# Patient Record
Sex: FEMALE | Race: WHITE | NOT HISPANIC OR LATINO | Employment: OTHER | URBAN - METROPOLITAN AREA
[De-identification: names, ages, dates, MRNs, and addresses within clinical notes are randomized per-mention and may not be internally consistent; named-entity substitution may affect disease eponyms.]

---

## 2017-09-26 RX ORDER — DOXYCYCLINE HYCLATE 20 MG
20 TABLET ORAL DAILY
COMMUNITY

## 2017-09-26 RX ORDER — ASPIRIN 81 MG/1
81 TABLET ORAL EVERY EVENING
COMMUNITY

## 2017-09-26 RX ORDER — PHENOL 1.4 %
600 AEROSOL, SPRAY (ML) MUCOUS MEMBRANE DAILY
COMMUNITY
End: 2022-07-29

## 2017-09-26 RX ORDER — FAMOTIDINE 20 MG/1
20 TABLET, FILM COATED ORAL DAILY
COMMUNITY

## 2017-09-26 RX ORDER — NAPROXEN SODIUM 220 MG
220 TABLET ORAL
COMMUNITY
End: 2021-08-05

## 2017-09-26 RX ORDER — HYDROCHLOROTHIAZIDE 12.5 MG/1
12.5 TABLET ORAL EVERY MORNING
COMMUNITY
End: 2021-10-06

## 2017-09-26 RX ORDER — VITAMIN E 268 MG
400 CAPSULE ORAL DAILY
COMMUNITY
End: 2022-05-20

## 2017-09-26 RX ORDER — EZETIMIBE AND SIMVASTATIN 10; 20 MG/1; MG/1
1 TABLET ORAL
COMMUNITY

## 2017-09-26 RX ORDER — MELATONIN
1000 DAILY
COMMUNITY
End: 2022-05-20

## 2017-09-26 NOTE — PRE-PROCEDURE INSTRUCTIONS
Pre-Surgery Instructions:   Medication Instructions    aspirin (ECOTRIN LOW STRENGTH) 81 mg EC tablet Instructed patient per Anesthesia Guidelines   calcium carbonate (OS-DENIS) 600 MG tablet Instructed patient per Anesthesia Guidelines   cholecalciferol (VITAMIN D3) 1,000 units tablet Instructed patient per Anesthesia Guidelines   doxycycline (PERIOSTAT) 20 MG tablet Instructed patient per Anesthesia Guidelines   ezetimibe-simvastatin (VYTORIN) 10-20 mg per tablet Instructed patient per Anesthesia Guidelines   famotidine (PEPCID) 20 mg tablet Instructed patient per Anesthesia Guidelines   hydrochlorothiazide (HYDRODIURIL) 12 5 mg tablet Instructed patient per Anesthesia Guidelines   Lysine 500 MG CAPS Instructed patient per Anesthesia Guidelines   Multiple Vitamins-Minerals (CENTRUM SILVER 50+WOMEN PO) Instructed patient per Anesthesia Guidelines   naproxen sodium (ALEVE) 220 MG tablet Instructed patient per Anesthesia Guidelines   vitamin E, tocopherol, 400 units capsule Instructed patient per Anesthesia Guidelines  Pre op instructions reviewed with pt via phone  Instructed to take famotidine a m of surgery    Diane Cross [phone]  My Surgical Experience    The following information was developed to assist you to prepare for your operation  What do I need to do before coming to the hospital?   Arrange for a responsible person to drive you to and from the hospital    Arrange care for your children at home  Children are not allowed in the recovery areas of the hospital   Plan to wear clothing that is easy to put on and take off  If you are having shoulder surgery, wear a shirt that buttons or zippers in the front  Bathing  o Shower the evening before and the morning of your surgery with an antibacterial soap   Please refer to the Pre Op Showering Instructions for Surgery Patients Sheet   o Remove nail polish and all body piercing jewelry  o Do not shave any body part for at least 24 hours before surgery-this includes face, arms, legs and upper body  Food  o Nothing to eat or drink after midnight the night before your surgery  This includes candy and chewing gum  o Exception: If your surgery is after 12:00pm (noon), you may have clear liquids such as 7-Up®, ginger ale, apple or cranberry juice, Jell-O®, water, or clear broth until 8:00 am  o Do not drink milk or juice with pulp on the morning before surgery  o Do not drink alcohol 24 hours before surgery  Medicine  o Follow instructions you received from your surgeon about which medicines you may take on the day of surgery  o If instructed to take medicine on the morning of surgery, take pills with just a small sip of water  Call your prescribing doctor for specific information on what to do if you take insulin    What should I bring to the hospital?    Bring:  Bibiana Plate or a walker, if you have them, for foot or knee surgery   A list of the daily medicines, vitamins, minerals, herbals and nutritional supplements you take  Include the dosages of medicines and the time you take them each day   Glasses, dentures or hearing aids   Minimal clothing; you will be wearing hospital sleepwear   Photo ID; required to verify your identity   If you have a Living Will or Power of , bring a copy of the documents   If you have an ostomy, bring an extra pouch and any supplies you use    Do not bring   Medicines or inhalers   Money, valuables or jewelry    What other information should I know about the day of surgery?  Notify your surgeons if you develop a cold, sore throat, cough, fever, rash or any other illness     Report to the Ambulatory Surgical/Same Day Surgery Unit   You will be instructed to stop at Registration only if you have not been pre-registered   Inform your  fi they do not stay that they will be asked by the staff to leave a phone number where they can be reached   Be available to be reached before surgery  In the event the operating room schedule changes, you may be asked to come in earlier or later than expected    *It is important to tell your doctor and others involved in your health care if you are taking or have been taking any non-prescription drugs, vitamins, minerals, herbals or other nutritional supplements   Any of these may interact with some food or medicines and cause a reaction

## 2017-10-01 ENCOUNTER — ANESTHESIA EVENT (OUTPATIENT)
Dept: PERIOP | Facility: AMBULARY SURGERY CENTER | Age: 72
End: 2017-10-01
Payer: MEDICARE

## 2017-10-02 ENCOUNTER — HOSPITAL ENCOUNTER (OUTPATIENT)
Facility: AMBULARY SURGERY CENTER | Age: 72
Setting detail: OUTPATIENT SURGERY
Discharge: HOME | End: 2017-10-02
Attending: OPHTHALMOLOGY | Admitting: OPHTHALMOLOGY
Payer: MEDICARE

## 2017-10-02 ENCOUNTER — ANESTHESIA (OUTPATIENT)
Dept: PERIOP | Facility: AMBULARY SURGERY CENTER | Age: 72
End: 2017-10-02
Payer: MEDICARE

## 2017-10-02 VITALS
OXYGEN SATURATION: 100 % | BODY MASS INDEX: 26.4 KG/M2 | HEIGHT: 63 IN | HEART RATE: 60 BPM | RESPIRATION RATE: 16 BRPM | TEMPERATURE: 97.8 F | WEIGHT: 149 LBS | DIASTOLIC BLOOD PRESSURE: 76 MMHG | SYSTOLIC BLOOD PRESSURE: 172 MMHG

## 2017-10-02 PROCEDURE — V2632 POST CHMBR INTRAOCULAR LENS: HCPCS | Performed by: OPHTHALMOLOGY

## 2017-10-02 DEVICE — IOL SN60WF 17.5: Type: IMPLANTABLE DEVICE | Site: EYE | Status: FUNCTIONAL

## 2017-10-02 RX ORDER — TETRACAINE HYDROCHLORIDE 5 MG/ML
SOLUTION OPHTHALMIC AS NEEDED
Status: DISCONTINUED | OUTPATIENT
Start: 2017-10-02 | End: 2017-10-02 | Stop reason: HOSPADM

## 2017-10-02 RX ORDER — LIDOCAINE HYDROCHLORIDE 20 MG/ML
1 JELLY TOPICAL
Status: COMPLETED | OUTPATIENT
Start: 2017-10-02 | End: 2017-10-02

## 2017-10-02 RX ORDER — LIDOCAINE HYDROCHLORIDE 10 MG/ML
INJECTION, SOLUTION EPIDURAL; INFILTRATION; INTRACAUDAL; PERINEURAL AS NEEDED
Status: DISCONTINUED | OUTPATIENT
Start: 2017-10-02 | End: 2017-10-02 | Stop reason: HOSPADM

## 2017-10-02 RX ORDER — KETOROLAC TROMETHAMINE 5 MG/ML
1 SOLUTION OPHTHALMIC
Status: COMPLETED | OUTPATIENT
Start: 2017-10-02 | End: 2017-10-02

## 2017-10-02 RX ORDER — MIDAZOLAM HYDROCHLORIDE 1 MG/ML
INJECTION INTRAMUSCULAR; INTRAVENOUS AS NEEDED
Status: DISCONTINUED | OUTPATIENT
Start: 2017-10-02 | End: 2017-10-02 | Stop reason: SURG

## 2017-10-02 RX ORDER — CYCLOPENTOLATE HYDROCHLORIDE 10 MG/ML
1 SOLUTION/ DROPS OPHTHALMIC
Status: COMPLETED | OUTPATIENT
Start: 2017-10-02 | End: 2017-10-02

## 2017-10-02 RX ORDER — GATIFLOXACIN 5 MG/ML
1 SOLUTION/ DROPS OPHTHALMIC 2 TIMES DAILY
Qty: 3 ML | Refills: 0 | Status: ON HOLD
Start: 2017-10-02 | End: 2017-10-23

## 2017-10-02 RX ORDER — GATIFLOXACIN 5 MG/ML
SOLUTION/ DROPS OPHTHALMIC AS NEEDED
Status: DISCONTINUED | OUTPATIENT
Start: 2017-10-02 | End: 2017-10-02 | Stop reason: HOSPADM

## 2017-10-02 RX ORDER — PHENYLEPHRINE HCL 2.5 %
1 DROPS OPHTHALMIC (EYE)
Status: COMPLETED | OUTPATIENT
Start: 2017-10-02 | End: 2017-10-02

## 2017-10-02 RX ORDER — TETRACAINE HYDROCHLORIDE 5 MG/ML
1 SOLUTION OPHTHALMIC ONCE
Status: COMPLETED | OUTPATIENT
Start: 2017-10-02 | End: 2017-10-02

## 2017-10-02 RX ADMIN — KETOROLAC TROMETHAMINE 1 DROP: 5 SOLUTION OPHTHALMIC at 11:00

## 2017-10-02 RX ADMIN — KETOROLAC TROMETHAMINE 1 DROP: 5 SOLUTION OPHTHALMIC at 11:26

## 2017-10-02 RX ADMIN — LIDOCAINE HYDROCHLORIDE 1 APPLICATION: 20 JELLY TOPICAL at 11:26

## 2017-10-02 RX ADMIN — CYCLOPENTOLATE HYDROCHLORIDE 1 DROP: 10 SOLUTION/ DROPS OPHTHALMIC at 10:45

## 2017-10-02 RX ADMIN — MIDAZOLAM HYDROCHLORIDE 2 MG: 1 INJECTION, SOLUTION INTRAMUSCULAR; INTRAVENOUS at 11:45

## 2017-10-02 RX ADMIN — PHENYLEPHRINE HYDROCHLORIDE 1 DROP: 25 SOLUTION/ DROPS OPHTHALMIC at 10:45

## 2017-10-02 RX ADMIN — CYCLOPENTOLATE HYDROCHLORIDE 1 DROP: 10 SOLUTION/ DROPS OPHTHALMIC at 11:15

## 2017-10-02 RX ADMIN — PHENYLEPHRINE HYDROCHLORIDE 1 DROP: 25 SOLUTION/ DROPS OPHTHALMIC at 11:26

## 2017-10-02 RX ADMIN — PHENYLEPHRINE HYDROCHLORIDE 1 DROP: 25 SOLUTION/ DROPS OPHTHALMIC at 11:15

## 2017-10-02 RX ADMIN — PHENYLEPHRINE HYDROCHLORIDE 1 DROP: 25 SOLUTION/ DROPS OPHTHALMIC at 11:00

## 2017-10-02 RX ADMIN — LIDOCAINE HYDROCHLORIDE 1 APPLICATION: 20 JELLY TOPICAL at 11:00

## 2017-10-02 RX ADMIN — LIDOCAINE HYDROCHLORIDE 1 APPLICATION: 20 JELLY TOPICAL at 10:45

## 2017-10-02 RX ADMIN — KETOROLAC TROMETHAMINE 1 DROP: 5 SOLUTION OPHTHALMIC at 10:45

## 2017-10-02 RX ADMIN — CYCLOPENTOLATE HYDROCHLORIDE 1 DROP: 10 SOLUTION/ DROPS OPHTHALMIC at 11:26

## 2017-10-02 RX ADMIN — LIDOCAINE HYDROCHLORIDE 1 APPLICATION: 20 JELLY TOPICAL at 11:15

## 2017-10-02 RX ADMIN — TETRACAINE HYDROCHLORIDE 1 DROP: 5 SOLUTION OPHTHALMIC at 10:45

## 2017-10-02 RX ADMIN — KETOROLAC TROMETHAMINE 1 DROP: 5 SOLUTION OPHTHALMIC at 11:15

## 2017-10-02 RX ADMIN — CYCLOPENTOLATE HYDROCHLORIDE 1 DROP: 10 SOLUTION/ DROPS OPHTHALMIC at 11:00

## 2017-10-02 NOTE — OP NOTE
OPERATIVE REPORT    PATIENT NAME: Monica Lu    :  1945  MRN: [id number]  Pt Location: [facility] OR ROOM 01    Surgery Date: 10/2/2017    Surgeon(s) and Role:     * Keyonna Aggarwal MD - Primary    Age-related nuclear cataract of left eye [H25 12]    Post-Op Diagnosis Codes:     * Age-related nuclear cataract of left eye [H25 12]    Procedure(s):  EXTRACTION EXTRACAPSULAR CATARACT PHACO INTRAOCULAR LENS (IOL)    Anesthesia Type:   IV Sedation with Anesthesia    Operative Indications:  Age-related nuclear cataract of left eye [H25 12]  Decreased vision to 20/50 with glare  With problems driving  Pt requested cataract sx the left eye    Procedure and Technique:    Procedure Details     The patient was brought in the OR in stable condition and placed on the operative table  The left eye was prepped and draped in the usual sterile manner  Attention was directed to the left eye where a lid speculum was placed  A 2 4 mm clear corneal incision was made temperally  1/2 cc of 1% MPF Lidocaine was irrigated into the anterior chamber followed by viscoat  The side port incision was placed superiorly  The capsularrhexis was made and the nucleus was hydrodissected with BSS  The nucleus was then removed with the phaco handpiece followed by removal of the cortical material with the I/A handpiece  The capsular bag was then filled with Provisc  The IOL was folded and placed in to the capsular bag and centered well  The remaining Provisc was removed from the eye with the I/A  The wounds were hydrated with BSS and found to be water tight  The lid speculum was removed and 2 drops of Gatifloxicin were placed over the cornea  A protective eye shield was taped over the eye and the patient went to PACU in stable condition  I will see the patient in the office tomorrow and the expected post op period is a few weeks         Complications: None        Disposition: PACU   Condition: Stable    SIGNATURE: Keyonna Aggarwal MD  DATE: October 2, 2017  TIME: 12:04 PM

## 2017-10-02 NOTE — ANESTHESIA PREPROCEDURE EVALUATION
Review of Systems/Medical History  Patient summary reviewed  Chart reviewed  History of anesthetic complications PONV    Cardiovascular  Hyperlipidemia, Hypertension ,    Pulmonary       GI/Hepatic    GERD ,             Endo/Other     GYN       Hematology   Musculoskeletal       Neurology   Psychology           Physical Exam    Airway      TM Distance: >3 FB  Neck ROM: full     Dental   No notable dental hx     Cardiovascular  Cardiovascular exam normal    Pulmonary  Pulmonary exam normal     Other Findings        Anesthesia Plan  ASA Score- 3       Anesthesia Type- IV sedation with anesthesia        Induction-       Informed Consent  Anesthetic plan and risks discussed with patient

## 2017-10-02 NOTE — DISCHARGE INSTRUCTIONS
Dr Ivan Liang Cataract Instructions    Activity:     1  No Driving until instructed   2  Keep shield on until seen tomorrow except when administering drops   3  No heavy lifting   4  No water in eye     Diet:     1  Resume normal diet    Normal Symptoms:     1  Mild Headache   2  Scratchy or picky feeling around eye    Call the office if:     1  You have any questions or concerns   2  If eye pain is not relieved by extra strength tylenol    Office phone number:  [phone]      Next appointment:     1  See Dr Ivan Liang at his office tomorrow as scheduled   __________________________________________________________   2  Bring blue eye kit with you and eyedrops to the office    A new set of comprehensive instructions will be given and reviewed with you during your office visit tomorrow

## 2017-10-20 NOTE — PRE-PROCEDURE INSTRUCTIONS
My Surgical Experience    The following information was developed to assist you to prepare for your operation  What do I need to do before coming to the hospital?   Arrange for a responsible person to drive you to and from the hospital    Arrange care for your children at home  Children are not allowed in the recovery areas of the hospital   Plan to wear clothing that is easy to put on and take off  If you are having shoulder surgery, wear a shirt that buttons or zippers in the front  Bathing  o Shower the evening before and the morning of your surgery with an antibacterial soap  Please refer to the Pre Op Showering Instructions for Surgery Patients Sheet   o Remove nail polish and all body piercing jewelry  o Do not shave any body part for at least 24 hours before surgery-this includes face, arms, legs and upper body  Food  o Nothing to eat or drink after midnight the night before your surgery  This includes candy and chewing gum  o Exception: If your surgery is after 12:00pm (noon), you may have clear liquids such as 7-Up®, ginger ale, apple or cranberry juice, Jell-O®, water, or clear broth until 8:00 am  o Do not drink milk or juice with pulp on the morning before surgery  o Do not drink alcohol 24 hours before surgery  Medicine  o Follow instructions you received from your surgeon about which medicines you may take on the day of surgery  o If instructed to take medicine on the morning of surgery, take pills with just a small sip of water  Call your prescribing doctor for specific infroamtion on what to do if you take insulin    What should I bring to the hospital?    Bring:  Sandor Hunt or a walker, if you have them, for foot or knee surgery   A list of the daily medicines, vitamins, minerals, herbals and nutritional supplements you take   Include the dosages of medicines and the time you take them each day   Glasses, dentures or hearing aids   Minimal clothing; you will be wearing hospital sleepwear   Photo ID; required to verify your identity   If you have a Living Will or Power of , bring a copy of the documents   If you have an ostomy, bring an extra pouch and any supplies you use    Do not bring   Medicines or inhalers   Money, valuables or jewelry    What other information should I know about the day of surgery?  Notify your surgeons if you develop a cold, sore throat, cough, fever, rash or any other illness   Report to the Ambulatory Surgical/Same Day Surgery Unit   You will be instructed to stop at Registration only if you have not been pre-registered   Inform your  fi they do not stay that they will be asked by the staff to leave a phone number where they can be reached   Be available to be reached before surgery  In the event the operating room schedule changes, you may be asked to come in earlier or later than expected    *It is important to tell your doctor and others involved in your health care if you are taking or have been taking any non-prescription drugs, vitamins, minerals, herbals or other nutritional supplements  Any of these may interact with some food or medicines and cause a reaction      Pre-Surgery Instructions:   Medication Instructions    aspirin (ECOTRIN LOW STRENGTH) 81 mg EC tablet Instructed patient per Anesthesia Guidelines   Bromfenac Sodium (BROMSITE) 0 075 % SOLN Instructed patient per Anesthesia Guidelines   calcium carbonate (OS-DENIS) 600 MG tablet Instructed patient per Anesthesia Guidelines   cholecalciferol (VITAMIN D3) 1,000 units tablet Instructed patient per Anesthesia Guidelines   doxycycline (PERIOSTAT) 20 MG tablet Instructed patient per Anesthesia Guidelines   ezetimibe-simvastatin (VYTORIN) 10-20 mg per tablet Instructed patient per Anesthesia Guidelines   famotidine (PEPCID) 20 mg tablet Instructed patient per Anesthesia Guidelines   gatifloxacin (ZYMAXID) 0 5 % Instructed patient per Anesthesia Guidelines   hydrochlorothiazide (HYDRODIURIL) 12 5 mg tablet Instructed patient per Anesthesia Guidelines   Lysine 500 MG CAPS Instructed patient per Anesthesia Guidelines   Multiple Vitamins-Minerals (CENTRUM SILVER 50+WOMEN PO) Instructed patient per Anesthesia Guidelines   naproxen sodium (ALEVE) 220 MG tablet Instructed patient per Anesthesia Guidelines   vitamin E, tocopherol, 400 units capsule Instructed patient per Anesthesia Guidelines  To take pepcid a m   Of surgery

## 2017-10-23 ENCOUNTER — HOSPITAL ENCOUNTER (OUTPATIENT)
Facility: AMBULARY SURGERY CENTER | Age: 72
Setting detail: OUTPATIENT SURGERY
Discharge: HOME | End: 2017-10-23
Attending: OPHTHALMOLOGY | Admitting: OPHTHALMOLOGY
Payer: MEDICARE

## 2017-10-23 ENCOUNTER — ANESTHESIA (OUTPATIENT)
Dept: PERIOP | Facility: AMBULARY SURGERY CENTER | Age: 72
End: 2017-10-23
Payer: MEDICARE

## 2017-10-23 ENCOUNTER — ANESTHESIA EVENT (OUTPATIENT)
Dept: PERIOP | Facility: AMBULARY SURGERY CENTER | Age: 72
End: 2017-10-23
Payer: MEDICARE

## 2017-10-23 VITALS
TEMPERATURE: 97.2 F | OXYGEN SATURATION: 97 % | DIASTOLIC BLOOD PRESSURE: 72 MMHG | HEART RATE: 55 BPM | RESPIRATION RATE: 20 BRPM | SYSTOLIC BLOOD PRESSURE: 174 MMHG

## 2017-10-23 PROCEDURE — V2632 POST CHMBR INTRAOCULAR LENS: HCPCS | Performed by: OPHTHALMOLOGY

## 2017-10-23 DEVICE — IOL SN60WF 17.5: Type: IMPLANTABLE DEVICE | Site: EYE | Status: FUNCTIONAL

## 2017-10-23 RX ORDER — CYCLOPENTOLATE HYDROCHLORIDE 10 MG/ML
1 SOLUTION/ DROPS OPHTHALMIC
Status: COMPLETED | OUTPATIENT
Start: 2017-10-23 | End: 2017-10-23

## 2017-10-23 RX ORDER — LIDOCAINE HYDROCHLORIDE 20 MG/ML
1 JELLY TOPICAL
Status: COMPLETED | OUTPATIENT
Start: 2017-10-23 | End: 2017-10-23

## 2017-10-23 RX ORDER — PHENYLEPHRINE HCL 2.5 %
1 DROPS OPHTHALMIC (EYE)
Status: COMPLETED | OUTPATIENT
Start: 2017-10-23 | End: 2017-10-23

## 2017-10-23 RX ORDER — KETOROLAC TROMETHAMINE 5 MG/ML
1 SOLUTION OPHTHALMIC
Status: COMPLETED | OUTPATIENT
Start: 2017-10-23 | End: 2017-10-23

## 2017-10-23 RX ORDER — MIDAZOLAM HYDROCHLORIDE 1 MG/ML
INJECTION INTRAMUSCULAR; INTRAVENOUS AS NEEDED
Status: DISCONTINUED | OUTPATIENT
Start: 2017-10-23 | End: 2017-10-23 | Stop reason: SURG

## 2017-10-23 RX ORDER — GATIFLOXACIN 5 MG/ML
1 SOLUTION/ DROPS OPHTHALMIC 2 TIMES DAILY
Qty: 3 ML | Refills: 0
Start: 2017-10-23 | End: 2020-07-01

## 2017-10-23 RX ORDER — LIDOCAINE HYDROCHLORIDE 10 MG/ML
INJECTION, SOLUTION EPIDURAL; INFILTRATION; INTRACAUDAL; PERINEURAL AS NEEDED
Status: DISCONTINUED | OUTPATIENT
Start: 2017-10-23 | End: 2017-10-23 | Stop reason: HOSPADM

## 2017-10-23 RX ORDER — TETRACAINE HYDROCHLORIDE 5 MG/ML
SOLUTION OPHTHALMIC AS NEEDED
Status: DISCONTINUED | OUTPATIENT
Start: 2017-10-23 | End: 2017-10-23 | Stop reason: HOSPADM

## 2017-10-23 RX ORDER — TETRACAINE HYDROCHLORIDE 5 MG/ML
1 SOLUTION OPHTHALMIC ONCE
Status: COMPLETED | OUTPATIENT
Start: 2017-10-23 | End: 2017-10-23

## 2017-10-23 RX ADMIN — LIDOCAINE HYDROCHLORIDE 1 APPLICATION: 20 JELLY TOPICAL at 11:49

## 2017-10-23 RX ADMIN — KETOROLAC TROMETHAMINE 1 DROP: 5 SOLUTION OPHTHALMIC at 12:08

## 2017-10-23 RX ADMIN — PHENYLEPHRINE HYDROCHLORIDE 1 DROP: 25 SOLUTION/ DROPS OPHTHALMIC at 11:49

## 2017-10-23 RX ADMIN — TETRACAINE HYDROCHLORIDE 1 DROP: 5 SOLUTION OPHTHALMIC at 11:35

## 2017-10-23 RX ADMIN — LIDOCAINE HYDROCHLORIDE 1 APPLICATION: 20 JELLY TOPICAL at 12:21

## 2017-10-23 RX ADMIN — CYCLOPENTOLATE HYDROCHLORIDE 1 DROP: 10 SOLUTION/ DROPS OPHTHALMIC at 12:08

## 2017-10-23 RX ADMIN — PHENYLEPHRINE HYDROCHLORIDE 1 DROP: 25 SOLUTION/ DROPS OPHTHALMIC at 11:35

## 2017-10-23 RX ADMIN — KETOROLAC TROMETHAMINE 1 DROP: 5 SOLUTION OPHTHALMIC at 12:21

## 2017-10-23 RX ADMIN — LIDOCAINE HYDROCHLORIDE 1 APPLICATION: 20 JELLY TOPICAL at 12:08

## 2017-10-23 RX ADMIN — PHENYLEPHRINE HYDROCHLORIDE 1 DROP: 25 SOLUTION/ DROPS OPHTHALMIC at 12:08

## 2017-10-23 RX ADMIN — LIDOCAINE HYDROCHLORIDE 1 APPLICATION: 20 JELLY TOPICAL at 11:35

## 2017-10-23 RX ADMIN — KETOROLAC TROMETHAMINE 1 DROP: 5 SOLUTION OPHTHALMIC at 11:49

## 2017-10-23 RX ADMIN — MIDAZOLAM HYDROCHLORIDE 1 MG: 1 INJECTION, SOLUTION INTRAMUSCULAR; INTRAVENOUS at 12:34

## 2017-10-23 RX ADMIN — CYCLOPENTOLATE HYDROCHLORIDE 1 DROP: 10 SOLUTION/ DROPS OPHTHALMIC at 11:35

## 2017-10-23 RX ADMIN — CYCLOPENTOLATE HYDROCHLORIDE 1 DROP: 10 SOLUTION/ DROPS OPHTHALMIC at 12:21

## 2017-10-23 RX ADMIN — CYCLOPENTOLATE HYDROCHLORIDE 1 DROP: 10 SOLUTION/ DROPS OPHTHALMIC at 11:49

## 2017-10-23 RX ADMIN — PHENYLEPHRINE HYDROCHLORIDE 1 DROP: 25 SOLUTION/ DROPS OPHTHALMIC at 12:21

## 2017-10-23 RX ADMIN — MIDAZOLAM HYDROCHLORIDE 1 MG: 1 INJECTION, SOLUTION INTRAMUSCULAR; INTRAVENOUS at 12:33

## 2017-10-23 RX ADMIN — KETOROLAC TROMETHAMINE 1 DROP: 5 SOLUTION OPHTHALMIC at 11:35

## 2017-10-23 NOTE — OP NOTE
OPERATIVE REPORT    PATIENT NAME: Rosalba Contreras    :  1945  MRN: [id number]  Pt Location: [address] ROOM 01    Surgery Date: 10/23/2017    Surgeon(s) and Role:     * Myra Meek MD - Primary    Age-related nuclear cataract of right eye [H25 11]    Post-Op Diagnosis Codes:     * Age-related nuclear cataract of right eye [H25 11]    Procedure(s):  EXTRACTION EXTRACAPSULAR CATARACT PHACO INTRAOCULAR LENS (IOL)    Anesthesia Type:   IV Sedation with Anesthesia    Operative Indications:  Age-related nuclear cataract of right eye [H25 11]  Decreased vision to 20/40  With problems driving  Pt requested cataract sx the right eye    Procedure and Technique:    Procedure Details     The patient was brought in the OR in stable condition and placed on the operative table  The right eye was prepped and draped in the usual sterile manner  Attention was directed to the right eye where a lid speculum was placed  A 2 4 mm clear corneal incision was made temperally  1/2 cc of 1% MPF Lidocaine was irrigated into the anterior chamber followed by viscoat  The side port incision was placed superiorly  The capsularrhexis was made and the nucleus was hydrodissected with BSS  The nucleus was then removed with the phaco handpiece followed by removal of the cortical material with the I/A handpiece  The capsular bag was then filled with Provisc  The IOL was folded and placed in to the capsular bag and centered well  The remaining Provisc was removed from the eye with the I/A  The wounds were hydrated with BSS and found to be water tight  The lid speculum was removed and 2 drops of Gatifloxicin were placed over the cornea  A protective eye shield was taped over the eye and the patient went to PACU in stable condition  I will see the patient in the office tomorrow and the expected post op period is a few weeks         Complications: None        Disposition: PACU   Condition: Stable    SIGNATURE: Myra Meek MD  DATE:  2017  TIME: 12:51 PM normal (ped)...

## 2017-10-23 NOTE — ANESTHESIA PREPROCEDURE EVALUATION
Review of Systems/Medical History  Patient summary reviewed  Chart reviewed  History of anesthetic complications PONV    Cardiovascular  Hyperlipidemia, Hypertension ,    Pulmonary       GI/Hepatic    GERD ,             Endo/Other  Arthritis     GYN       Hematology   Musculoskeletal       Neurology   Psychology           Physical Exam    Airway    Mallampati score: II  TM Distance: >3 FB  Neck ROM: full     Dental   No notable dental hx     Cardiovascular  Cardiovascular exam normal    Pulmonary  Pulmonary exam normal     Other Findings        Anesthesia Plan  ASA Score- 3       Anesthesia Type- IV sedation with anesthesia with ASA Monitors  Additional Monitors:   Airway Plan:           Induction-       Informed Consent- Anesthetic plan and risks discussed with patient

## 2017-10-23 NOTE — DISCHARGE INSTRUCTIONS
Dr Quinton Solo Cataract Instructions    Activity:     1  No Driving until instructed   2  Keep shield on until seen tomorrow except when administering drops   3  No heavy lifting   4  No water in eye     Diet:     1  Resume normal diet    Normal Symptoms:     1  Mild Headache   2  Scratchy or picky feeling around eye    Call the office if:     1  You have any questions or concerns   2  If eye pain is not relieved by extra strength tylenol    Office phone number:  [phone]      Next appointment:     1  See Dr Quinton Solo at his office tomorrow as scheduled   __1115am________________________________________________________   2  Bring blue eye kit with you and eyedrops to the office    A new set of comprehensive instructions will be given and reviewed with you during your office visit tomorrow

## 2018-10-02 ENCOUNTER — EVALUATION (OUTPATIENT)
Dept: PHYSICAL THERAPY | Facility: CLINIC | Age: 73
End: 2018-10-02
Payer: MEDICARE

## 2018-10-02 ENCOUNTER — TRANSCRIBE ORDERS (OUTPATIENT)
Dept: PHYSICAL THERAPY | Facility: CLINIC | Age: 73
End: 2018-10-02

## 2018-10-02 DIAGNOSIS — G89.29 CHRONIC PAIN OF LEFT KNEE: Primary | ICD-10-CM

## 2018-10-02 DIAGNOSIS — M25.562 ACUTE PAIN OF LEFT KNEE: Primary | ICD-10-CM

## 2018-10-02 DIAGNOSIS — M25.562 CHRONIC PAIN OF LEFT KNEE: Primary | ICD-10-CM

## 2018-10-02 PROCEDURE — G8978 MOBILITY CURRENT STATUS: HCPCS | Performed by: PHYSICAL THERAPIST

## 2018-10-02 PROCEDURE — 97161 PT EVAL LOW COMPLEX 20 MIN: CPT | Performed by: PHYSICAL THERAPIST

## 2018-10-02 PROCEDURE — G8979 MOBILITY GOAL STATUS: HCPCS | Performed by: PHYSICAL THERAPIST

## 2018-10-02 PROCEDURE — 97110 THERAPEUTIC EXERCISES: CPT | Performed by: PHYSICAL THERAPIST

## 2018-10-02 NOTE — PROGRESS NOTES
PT Evaluation     Today's date: 10/2/2018  Patient name: Vincent Ortiz  : 1945  MRN: [id number]  Referring provider: Svitlana Rocha MD  Dx:   Encounter Diagnosis     ICD-10-CM    1  Acute pain of left knee M25 562        Start Time: 1555  Stop Time: 1640  Total time in clinic (min): 45 minutes    Assessment  Impairments: abnormal gait, abnormal muscle firing, abnormal muscle tone, abnormal or restricted ROM, abnormal movement, impaired balance, impaired physical strength, lacks appropriate home exercise program, pain with function and poor posture     Assessment details: Patient is a 74 y o  female who presents with s/s consistent with L medial posterior meniscus tear  Patient presents to evaluation with pain, decreased range of motion, swelling, decreased strength and decreased tolerance to activity  She is currently having pain with stair climbing, kneeling, and prolonged walking that limits quality of life  Patient is a good candidate for skilled PT, and would benefit from skilled PT services to address these impairments, achieve goals, and to maximize function   Thank you for the referral     Understanding of Dx/Px/POC: good   Prognosis: good    Goals  Impairment Goals 4-6 weeks  - Decrease pain to <3/10  - Improve knee AROM to equal to the unaffected lower extremity  - Increase knee strength to 4+/5 throughout  - Increase hip strength to 4+/5 throughout    Functional Goals 6-8 weeks  - Return to Prior Level of Function  - Increase Functional Status Measure (FOTO) to: 58  - Patient will be independent with HEP  - Patient will be able to kneel without increased pain/compensation/difficulty  - Patient will be able to walk for prolonged periods without increased pain/compensation/difficulty   - Patient will be able to ascend and descend stairs without increased pain/compensation/difficulty   - Patient will be able to raise from the floor with proper mechanics and without increased pain/compensation/difficulty     Plan  Patient would benefit from: skilled physical therapy  Planned modality interventions: cryotherapy, electrical stimulation/Russian stimulation, high voltage pulsed current: pain management, high voltage pulsed current: spasm management, H-Wave, TENS, thermotherapy: hydrocollator packs, ultrasound, unattended electrical stimulation and traction  Planned therapy interventions: abdominal trunk stabilization, behavior modification, body mechanics training, breathing training, flexibility, functional ROM exercises, home exercise program, joint mobilization, manual therapy, massage, Olivera taping, muscle pump exercises, neuromuscular re-education, patient education, postural training, strengthening, stretching, therapeutic activities, therapeutic exercise and therapeutic training  Frequency: 2x week  Duration in weeks: 8  Treatment plan discussed with: patient        Subjective Evaluation    History of Present Illness  Mechanism of injury: 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[employer]  HOME LIFE: Lives at home with her   [address] and [place]  Laundry and TV is in the basement 14 steps down  HOBBIES/EXERCISE: Patient has silver sneakers, and was working out there until a few months ago  Patient belongs to choirs and practices in the evening  She enjoys gardening as well  PLOF: No other issues prior to the knee injury  HISTORY OF CURRENT INJURY: Patient reports and increase in L knee pain < 1 month ago in the middle of September  She felt increase of pain especially when going down stairs  PAIN LOCATION/DESCRIPTORS: Pain located in medial posterior knee  Described as aching, and occasionally she feels the pain down into her L calf  AGGRAVATING FACTORS:  Stairs, especially descending step over step   Planting and twisting, kneeling are also painful, and bending the knee and raising from the floor and walking  EASES: Sitting  DAY PATTERN: Pain radually increases throughout the day  IMAGING:  X-rays showed minimal arthritis in knees  MRI showed meniscus tear in L posterior medial meniscus  SPECIAL QUESTIONS:  Hx of basal cell cancer 5 years ago  Some pre-cancer now on the top of her head  PATIENT GOALS: Patient wishes to improve her pain levels so she can kneel in the garden and go down steps again  Pain  Current pain ratin  At best pain ratin  At worst pain ratin  Quality: dull ache, sharp, discomfort and radiating  Progression: worsening    Patient Goals  Patient goals for therapy: decreased pain, increased motion, increased strength, independence with ADLs/IADLs and return to sport/leisure activities          Objective     Observations     Additional Observation Details  Patient has bilateral Trendelenburg when ambulating, but no antalgic gait  Palpation     Additional Palpation Details  TTP of medial posterior knee, lateral joint line, and to proximal calf    Active Range of Motion   Left Knee   Flexion: 122 degrees with pain  Extension: 0 degrees     Right Knee   Flexion: 139 degrees   Extension: 0 degrees     Strength/Myotome Testing     Left Hip   Planes of Motion   Flexion: 3+  Abduction: 3+  Adduction: 4  External rotation: 4  Internal rotation: 4    Right Hip   Planes of Motion   Flexion: 4-  Abduction: 3+  Adduction: 4  External rotation: 4  Internal rotation: 4    Left Knee   Flexion: 3+  Extension: 3+    Right Knee   Flexion: 4-  Extension: 4    Left Ankle/Foot   Dorsiflexion: 4+  Plantar flexion: 4+  Inversion: 4+  Eversion: 4+    Right Ankle/Foot   Dorsiflexion: 4+  Plantar flexion: 4+  Inversion: 4+  Eversion: 4+    Tests     Left Knee   Positive medial Candie and Thessaly's test at 5 degrees  Negative valgus stress test at 30 degrees and varus stress test at 30 degrees  Right Knee   Negative medial Candie, valgus stress test at 30 degrees and varus stress test at 30 degrees       Swelling     Left Knee Girth Measurement (cm)   Joint line: 40 5 cm  10 cm above joint line: 45 cm  10 cm below joint line: 36 cm    Right Knee Girth Measurement (cm)   Joint line: 43 cm  10 cm above joint line: 37 cm  10 cm below joint line: 35 5 cm      Flowsheet Rows      Most Recent Value   PT/OT G-Codes   Current Score  51   Projected Score  62   FOTO information reviewed  Yes   Assessment Type  Evaluation   G code set  Mobility: Walking & Moving Around   Mobility: Walking and Moving Around Current Status ()  CK   Mobility: Walking and Moving Around Goal Status ()  CH         Diagnosis: L medial posterior meniscus tear   Precautions: PMH of skin CA   Manual Therapy 10/2       Tibial distraction        Long axis distraction        Patellar mobs        STM                Exercise Diary         Recumbent bike - if tolerated        Calf stretch against wall 10x10"       Hamstring stretch        Hip adductor stretch        Heel slides X 20 with strap, 2 sec hold       Quad sets        Clamshells        3 way SLR        Glute sets        TA activation with stabilizer        Bridges        Mini squats - hip strategy                                                                        Modalities        Ice

## 2018-10-02 NOTE — LETTER
2018    Sherry Joseph MD  [facility] [address] [place] [address]    Patient: Chacho Hogan   YOB: 1945   Date of Visit: 10/2/2018     Encounter Diagnosis     ICD-10-CM    1  Acute pain of left knee M25 562        Dear Dr Witt March:    Please review the attached Plan of Care from [facility] recent visit  Please verify that you agree therapy should continue by signing the attached document and sending it back to our office  If you have any questions or concerns, please don't hesitate to call  Sincerely,    Mortimer Meeter, PT      Referring Provider:      I certify that I have read the below Plan of Care and certify the need for these services furnished under this plan of treatment while under my care  Sherry Joseph MD  [facility]: [phone]          PT Evaluation     Today's date: 10/2/2018  Patient name: Chacho Hogan  : 1945  MRN: [id number]  Referring provider: Teresa Ghosh MD  Dx:   Encounter Diagnosis     ICD-10-CM    1  Acute pain of left knee M25 562        Start Time: 1555  Stop Time: 1640  Total time in clinic (min): 45 minutes    Assessment  Impairments: abnormal gait, abnormal muscle firing, abnormal muscle tone, abnormal or restricted ROM, abnormal movement, impaired balance, impaired physical strength, lacks appropriate home exercise program, pain with function and poor posture     Assessment details: Patient is a 74 y o  female who presents with s/s consistent with L medial posterior meniscus tear  Patient presents to evaluation with pain, decreased range of motion, swelling, decreased strength and decreased tolerance to activity  She is currently having pain with stair climbing, kneeling, and prolonged walking that limits quality of life   Patient is a good candidate for skilled PT, and would benefit from skilled PT services to address these impairments, achieve goals, and to maximize function  Thank you for the referral     Understanding of Dx/Px/POC: good   Prognosis: good    Goals  Impairment Goals 4-6 weeks  - Decrease pain to <3/10  - Improve knee AROM to equal to the unaffected lower extremity  - Increase knee strength to 4+/5 throughout  - Increase hip strength to 4+/5 throughout    Functional Goals 6-8 weeks  - Return to Prior Level of Function  - Increase Functional Status Measure (FOTO) to: 58  - Patient will be independent with HEP  - Patient will be able to kneel without increased pain/compensation/difficulty  - Patient will be able to walk for prolonged periods without increased pain/compensation/difficulty   - Patient will be able to ascend and descend stairs without increased pain/compensation/difficulty   - Patient will be able to raise from the floor with proper mechanics and without increased pain/compensation/difficulty     Plan  Patient would benefit from: skilled physical therapy  Planned modality interventions: cryotherapy, electrical stimulation/Russian stimulation, high voltage pulsed current: pain management, high voltage pulsed current: spasm management, H-Wave, TENS, thermotherapy: hydrocollator packs, ultrasound, unattended electrical stimulation and traction  Planned therapy interventions: abdominal trunk stabilization, behavior modification, body mechanics training, breathing training, flexibility, functional ROM exercises, home exercise program, joint mobilization, manual therapy, massage, Olivera taping, muscle pump exercises, neuromuscular re-education, patient education, postural training, strengthening, stretching, therapeutic activities, therapeutic exercise and therapeutic training  Frequency: 2x week  Duration in weeks: 8  Treatment plan discussed with: patient        Subjective Evaluation    History of Present Illness  Mechanism of injury: 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[address] [place] [address] LIFE: Lives at home with her   2 [address] and [place]  Laundry and TV is in the basement 14 steps down  HOBBIES/EXERCISE: Patient has silver sneakers, and was working out there until a few months ago  Patient belongs to choirs and practices in the evening  She enjoys gardening as well  PLOF: No other issues prior to the knee injury  HISTORY OF CURRENT INJURY: Patient reports and increase in L knee pain < 1 month ago in the middle of September  She felt increase of pain especially when going down stairs  PAIN LOCATION/DESCRIPTORS: Pain located in medial posterior knee  Described as aching, and occasionally she feels the pain down into her L calf  AGGRAVATING FACTORS:  Stairs, especially descending step over step  Planting and twisting, kneeling are also painful, and bending the knee and raising from the floor and walking  EASES: Sitting  DAY PATTERN: Pain radually increases throughout the day  IMAGING:  X-rays showed minimal arthritis in knees  MRI showed meniscus tear in L posterior medial meniscus  SPECIAL QUESTIONS:  Hx of basal cell cancer 5 years ago  Some pre-cancer now on the top of her head  PATIENT GOALS: Patient wishes to improve her pain levels so she can kneel in the garden and go down steps again  Pain  Current pain ratin  At best pain ratin  At worst pain ratin  Quality: dull ache, sharp, discomfort and radiating  Progression: worsening    Patient Goals  Patient goals for therapy: decreased pain, increased motion, increased strength, independence with ADLs/IADLs and return to sport/leisure activities          Objective     Observations     Additional Observation Details  Patient has bilateral Trendelenburg when ambulating, but no antalgic gait      Palpation     Additional Palpation Details  TTP of medial posterior knee, lateral joint line, and to proximal calf    Active Range of Motion   Left Knee   Flexion: 122 degrees with pain  Extension: 0 degrees     Right Knee Flexion: 139 degrees   Extension: 0 degrees     Strength/Myotome Testing     Left Hip   Planes of Motion   Flexion: 3+  Abduction: 3+  Adduction: 4  External rotation: 4  Internal rotation: 4    Right Hip   Planes of Motion   Flexion: 4-  Abduction: 3+  Adduction: 4  External rotation: 4  Internal rotation: 4    Left Knee   Flexion: 3+  Extension: 3+    Right Knee   Flexion: 4-  Extension: 4    Left Ankle/Foot   Dorsiflexion: 4+  Plantar flexion: 4+  Inversion: 4+  Eversion: 4+    Right Ankle/Foot   Dorsiflexion: 4+  Plantar flexion: 4+  Inversion: 4+  Eversion: 4+    Tests     Left Knee   Positive medial Candie and Thessaly's test at 5 degrees  Negative valgus stress test at 30 degrees and varus stress test at 30 degrees  Right Knee   Negative medial Candie, valgus stress test at 30 degrees and varus stress test at 30 degrees       Swelling     Left Knee Girth Measurement (cm)   Joint line: 40 5 cm  10 cm above joint line: 45 cm  10 cm below joint line: 36 cm    Right Knee Girth Measurement (cm)   Joint line: 43 cm  10 cm above joint line: 37 cm  10 cm below joint line: 35 5 cm      Flowsheet Rows      Most Recent Value   PT/OT G-Codes   Current Score  51   Projected Score  62   FOTO information reviewed  Yes   Assessment Type  Evaluation   G code set  Mobility: Walking & Moving Around   Mobility: Walking and Moving Around Current Status ()  CK   Mobility: Walking and Moving Around Goal Status ()  CH         Diagnosis: L medial posterior meniscus tear   Precautions: PMH of skin CA   Manual Therapy 10/2       Tibial distraction        Long axis distraction        Patellar mobs        STM                Exercise Diary         Recumbent bike - if tolerated        Calf stretch against wall 10x10"       Hamstring stretch        Hip adductor stretch        Heel slides X 20 with strap, 2 sec hold       Quad sets        Clamshells        3 way SLR        Glute sets        TA activation with stabilizer Bridges        Mini squats - hip strategy                                                                        Modalities        Ice

## 2018-10-04 ENCOUNTER — OFFICE VISIT (OUTPATIENT)
Dept: PHYSICAL THERAPY | Facility: CLINIC | Age: 73
End: 2018-10-04
Payer: MEDICARE

## 2018-10-04 DIAGNOSIS — M25.562 ACUTE PAIN OF LEFT KNEE: ICD-10-CM

## 2018-10-04 PROCEDURE — 97110 THERAPEUTIC EXERCISES: CPT

## 2018-10-04 PROCEDURE — 97140 MANUAL THERAPY 1/> REGIONS: CPT

## 2018-10-04 PROCEDURE — 97112 NEUROMUSCULAR REEDUCATION: CPT

## 2018-10-04 NOTE — PROGRESS NOTES
Daily Note     Today's date: 10/4/2018  Patient name: Sepideh Alvarez  : 1945  MRN: [id number]  Referring provider: Buzz Milton MD  Dx:   Encounter Diagnosis     ICD-10-CM    1  Acute pain of left knee M25 562        Start Time: 1400  Stop Time: 1500  Total time in clinic (min): 60 minutes    Subjective: Patient noted she has a 5/10 pain today in the L knee  Patient noted she did a lot of walking this morning  Objective: See treatment diary below      Diagnosis: L medial posterior meniscus tear   Precautions: PMH of skin CA   Manual Therapy 10/2 10/4      Tibial distraction  5 mins      Long axis distraction        Patellar mobs  5 mins      STM                Exercise Diary   10/4      Recumbent bike - if tolerated  8 mins      Calf stretch against wall 10x10" 3 x 30"      Hamstring stretch  3 x 30"      Hip adductor stretch  3 x 30"      Heel slides X 20 with strap, 2 sec hold X20, 2 sec hold      Quad sets  X20, 5 sec hold      Clamshells  x15 ea      3 way SLR  Unable to perform SLR, performed x15 of hip abduction      Glute sets  X20, 5 sec hold      TA activation with stabilizer  X 15, hold 5 seconds      Bridges  x15 with TA      Mini squats - hip strategy  nv                                                                      Modalities  10/4      Ice  10 mins posterior knee in supine                            Assessment: Patient performed various strengthening and stretching exercises to assist with knee pain while ascending the stairs  Patient performed with moderate VCs for form 2* decreased hip strength  PT performed grade III patellar and knee mobilizations to assist c pain and joint mobility  PT applied CP at the end of th session to the posterior knee  Patient noted a slight decrease in pain at the end of the session  Patient would benefit from continued PT to allow the patient to return to her PLOF  Plan: Continue per plan of care

## 2018-10-08 ENCOUNTER — OFFICE VISIT (OUTPATIENT)
Dept: PHYSICAL THERAPY | Facility: CLINIC | Age: 73
End: 2018-10-08
Payer: MEDICARE

## 2018-10-08 DIAGNOSIS — M25.562 ACUTE PAIN OF LEFT KNEE: Primary | ICD-10-CM

## 2018-10-08 PROCEDURE — 97140 MANUAL THERAPY 1/> REGIONS: CPT | Performed by: PHYSICAL THERAPIST

## 2018-10-08 PROCEDURE — 97112 NEUROMUSCULAR REEDUCATION: CPT | Performed by: PHYSICAL THERAPIST

## 2018-10-08 PROCEDURE — 97110 THERAPEUTIC EXERCISES: CPT | Performed by: PHYSICAL THERAPIST

## 2018-10-08 NOTE — PROGRESS NOTES
Daily Note     Today's date: 10/8/2018  Patient name: Miranda Stratton  : 1945  MRN: [id number]  Referring provider: Starla Rodrigues MD  Dx:   Encounter Diagnosis     ICD-10-CM    1  Acute pain of left knee M25 562        Start Time: 0  Stop Time: 1250  Total time in clinic (min): 47 minutes    Subjective: Patient reports she has no pain just "discomfort" when she walks and does stairs  She [place] and she felt it, but she was not in pain  She felt slower than her companions  Objective: See treatment diary below    Diagnosis: L medial posterior meniscus tear   Precautions: PMH of skin CA   Manual Therapy 10/2 10/4 10/8     Tibial distraction  5 mins 5 min     Long axis distraction        Patellar mobs  5 mins 3 min     STM        Fat pad taping   5 min     Exercise Diary   10/4 10/8     Recumbent bike - if tolerated  8 mins      Calf stretch against wall* 10x10" 3 x 30" 3x30 sec ea     Hamstring stretch  3 x 30" 3x30 sec     Hip adductor stretch  3 x 30" 3x30 sec      Heel slides* X 20 with strap, 2 sec hold X20, 2 sec hold 10 x 10 sec holds     Quad sets  X20, 5 sec hold X 20, 2 sec hold     Clamshells  x15 ea      3 way SLR*  Unable to perform SLR, performed x15 of hip abduction SLR 3x5 (R) and 3x10 L     Glute sets  X20, 5 sec hold X 20 with quad sets     TA activation with stabilizer  X 15, hold 5 seconds X 20 with cues for breathing     Bridges  x15 with TA 3x10 with TA acitvation     Mini squats - hip strategy  nv      Step ups        Standing 3 way SLR        Balance on foam        Biodex        Wobble board                                                Modalities  10/4      Ice  10 mins posterior knee in supine Declined                       Assessment: Tolerated treatment well  Patient exhibited good technique with therapeutic exercises and tolerated progression of exercises well   Added hamstring and adductor stretches and SLR to HEP with education on importance of performing exercises to progress in therapy  Trailed fat pad taping to decreased edema in L knee  Plan: Continue per plan of care

## 2018-10-12 ENCOUNTER — OFFICE VISIT (OUTPATIENT)
Dept: PHYSICAL THERAPY | Facility: CLINIC | Age: 73
End: 2018-10-12
Payer: MEDICARE

## 2018-10-12 DIAGNOSIS — M25.562 ACUTE PAIN OF LEFT KNEE: Primary | ICD-10-CM

## 2018-10-12 PROCEDURE — 97112 NEUROMUSCULAR REEDUCATION: CPT | Performed by: PHYSICAL THERAPIST

## 2018-10-12 PROCEDURE — 97110 THERAPEUTIC EXERCISES: CPT | Performed by: PHYSICAL THERAPIST

## 2018-10-12 NOTE — PROGRESS NOTES
Daily Note     Today's date: 10/12/2018  Patient name: Ramez Fu  : 1945  MRN: [id number]  Referring provider: Moise Lorenzo MD  Dx:   Encounter Diagnosis     ICD-10-CM    1  Acute pain of left knee M25 562        Start Time: 1315  Stop Time: 1400  Total time in clinic (min): 45 minutes    Subjective: Patient reports her L knee is doing much better  Objective: See treatment diary below    Diagnosis: L medial posterior meniscus tear   Precautions: PMH of skin CA   Manual Therapy 10/2 10/4 10/8 10/12    Tibial distraction  5 mins 5 min     Long axis distraction        Patellar mobs  5 mins 3 min     STM        Fat pad taping   5 min     Exercise Diary   10/4 10/8 10/12    Recumbent bike - if tolerated  8 mins  4 min    Calf stretch against wall* 10x10" 3 x 30" 3x30 sec ea 3x30 sec ea    Hamstring stretch*  3 x 30" 3x30 sec 3x30 sec    Hip adductor stretch*  3 x 30" 3x30 sec  3x30 sec    Heel slides* X 20 with strap, 2 sec hold X20, 2 sec hold 10 x 10 sec holds X 20 5 sec holds    Quad sets*  X20, 5 sec hold X 20, 2 sec hold HEP    Clamshells  x15 ea      3 way SLR*  Unable to perform SLR, performed x15 of hip abduction SLR 3x5 (R) and 3x10 L 3x10 bilaterally     Glute sets  X20, 5 sec hold X 20 with quad sets     TA activation with stabilizer  X 15, hold 5 seconds X 20 with cues for breathing HEP    Bridges  x15 with TA 3x10 with TA acitvation HEP    Mini squats - hip strategy  nv  2x10 hold on to barre - some knee discomfort    Step ups        Standing 3 way SLR    2x10 ea direction    Balance on foam    39 sec EO/EC narrow and normal KEL    Biodex        Wobble board                                                Modalities  10/4      Ice  10 mins posterior knee in supine Declined Declined                        Assessment: Tolerated treatment well, with some discomfort with the addition of mini squats   Patient exhibited good technique with therapeutic exercises and would benefit from continued PT  Patient will leave for vacation early next Tuesday morning and be seen in 2 weeks after she returns  She plans to [facility] for this timeframe  Plan: Continue per plan of care

## 2018-10-15 ENCOUNTER — OFFICE VISIT (OUTPATIENT)
Dept: PHYSICAL THERAPY | Facility: CLINIC | Age: 73
End: 2018-10-15
Payer: MEDICARE

## 2018-10-15 DIAGNOSIS — M25.562 ACUTE PAIN OF LEFT KNEE: Primary | ICD-10-CM

## 2018-10-15 PROCEDURE — 97110 THERAPEUTIC EXERCISES: CPT | Performed by: PHYSICAL THERAPIST

## 2018-10-15 PROCEDURE — G8979 MOBILITY GOAL STATUS: HCPCS | Performed by: PHYSICAL THERAPIST

## 2018-10-15 PROCEDURE — G8978 MOBILITY CURRENT STATUS: HCPCS | Performed by: PHYSICAL THERAPIST

## 2018-10-15 PROCEDURE — 97112 NEUROMUSCULAR REEDUCATION: CPT | Performed by: PHYSICAL THERAPIST

## 2018-10-15 NOTE — PROGRESS NOTES
Daily Note     Today's date: 10/15/2018  Patient name: Key Persaud  : 1945  MRN: [id number]  Referring provider: Tyler Stark MD  Dx:   Encounter Diagnosis     ICD-10-CM    1  Acute pain of left knee M25 562        Start Time: 1211  Stop Time: 1300  Total time in clinic (min): 49 minutes    Subjective: Patient was very sore on Saturday after therapy on Friday, and even had to ice  The pain was muscle soreness in the back of the L knee, and it resolved over the weekend         Objective: See treatment diary below    Diagnosis: L medial posterior meniscus tear   Precautions: PMH of skin CA   Manual Therapy 10/2 10/4 10/8 10/12 10/15   Tibial distraction  5 mins 5 min     Long axis distraction        Patellar mobs  5 mins 3 min     STM        Fat pad taping   5 min     Exercise Diary   10/4 10/8 10/12 10/15   Recumbent bike - if tolerated  8 mins  4 min 4 min   Calf stretch against wall* 10x10" 3 x 30" 3x30 sec ea 3x30 sec ea 3x30 sec ea   Hamstring stretch*  3 x 30" 3x30 sec 3x30 sec 3x30 sec L only   Hip adductor stretch*  3 x 30" 3x30 sec  3x30 sec 3x30 sec L only   Heel slides* X 20 with strap, 2 sec hold X20, 2 sec hold 10 x 10 sec holds X 20 5 sec holds X 10 5 sec   Quad sets*  X20, 5 sec hold X 20, 2 sec hold HEP    Clamshells  x15 ea      3 way SLR*  Unable to perform SLR, performed x15 of hip abduction SLR 3x5 (R) and 3x10 L 3x10 bilaterally  2x10 uriel SLR only   Glute sets  X20, 5 sec hold X 20 with quad sets     TA activation with stabilizer  X 15, hold 5 seconds X 20 with cues for breathing HEP    Bridges  x15 with TA 3x10 with TA acitvation HEP 2x10 TA activation   Mini squats - hip strategy  nv  2x10 hold on to barre - some knee discomfort Deferred   Step ups        Standing 3 way SLR    2x10 ea direction X 10 ea   Balance on foam    30 sec EO/EC narrow and normal KEL    Biodex     LOS x 3 static, best 54%  Maze x 2 57%   Wobble board     1 min ea Modalities  10/4      Ice  10 mins posterior knee in supine Declined Declined Declined                       Assessment: Tolerated treatment well  Did not add new strengthening exercises and reduced some reps of standing exercises due to subjective complaints of muscle soreness  Patient exhibited good technique with therapeutic exercises and FOTO was retaken for an improved score of 64  Focused session on more balance exercises to decrease soreness and improve balance  Plan: Continue per plan of care  Progress treatment as tolerated

## 2018-10-29 ENCOUNTER — OFFICE VISIT (OUTPATIENT)
Dept: PHYSICAL THERAPY | Facility: CLINIC | Age: 73
End: 2018-10-29
Payer: MEDICARE

## 2018-10-29 DIAGNOSIS — M25.562 ACUTE PAIN OF LEFT KNEE: Primary | ICD-10-CM

## 2018-10-29 PROCEDURE — 97112 NEUROMUSCULAR REEDUCATION: CPT | Performed by: PHYSICAL THERAPIST

## 2018-10-29 PROCEDURE — 97110 THERAPEUTIC EXERCISES: CPT | Performed by: PHYSICAL THERAPIST

## 2018-10-29 NOTE — PROGRESS NOTES
Daily Note     Today's date: 10/29/2018  Patient name: Sandra Hanley  : 1945  MRN: [id number]  Referring provider: Herman Do MD  Dx:   Encounter Diagnosis     ICD-10-CM    1  Acute pain of left knee M25 562        Start Time: 1545  Stop Time: 1630  Total time in clinic (min): 45 minutes    Subjective: Patient reports she was on vacation and walked a lot without any knee pain  She reports she continues to have some weakness in L leg which is felt when going down stairs  Patient reports she has not done many exercises this past week        Objective: See treatment diary below    Diagnosis: L medial posterior meniscus tear   Precautions: PMH of skin CA   Manual Therapy 10/29 10/4 10/8 10/12 10/15   Tibial distraction  5 mins 5 min     Long axis distraction        Patellar mobs  5 mins 3 min     STM        Fat pad taping   5 min     Exercise Diary  10/29 10/4 10/8 10/12 10/15   Recumbent bike - if tolerated  8 mins  4 min 4 min   Calf stretch against wall* 3x30 sec ea 3 x 30" 3x30 sec ea 3x30 sec ea 3x30 sec ea   Hamstring stretch* 3x30 sec L only 3 x 30" 3x30 sec 3x30 sec 3x30 sec L only   Hip adductor stretch* 3x30 sec L only 3 x 30" 3x30 sec  3x30 sec 3x30 sec L only   Heel slides* X 20 1 sec holds X20, 2 sec hold 10 x 10 sec holds X 20 5 sec holds X 10 5 sec   Quad sets*  X20, 5 sec hold X 20, 2 sec hold HEP    Clamshells 2x10 with yellow TB x15 ea      3 way SLR* 2x10 1# SLR only Unable to perform SLR, performed x15 of hip abduction SLR 3x5 (R) and 3x10 L 3x10 bilaterally  2x10 uriel SLR only   Glute sets  X20, 5 sec hold X 20 with quad sets     TA activation with stabilizer  X 15, hold 5 seconds X 20 with cues for breathing HEP    Bridges 3x10 TA activation x15 with TA 3x10 with TA acitvation HEP 2x10 TA activation   Mini squats - hip strategy  nv  2x10 hold on to barre - some knee discomfort Deferred   Step ups 2x10 6" step       Standing 3 way SLR    2x10 ea direction X 10 ea   Balance on foam 30 sec EO/EC narrow and normal KLE    Biodex LOS x 3 static, best 48%  Maze x 2 best 60%    LOS x 3 static, best 54%  Maze x 2 57%   Wobble board 1 min ea    1 min ea   Sit to stand 3x10                                       Modalities  10/4      Ice  10 mins posterior knee in supine Declined Declined Declined                       Assessment: Tolerated treatment well  Patient exhibited good technique with therapeutic exercises and would benefit from continued PT  Added step training today to address complaint about weakness on stairs, which she tolerated without pain  She was able to perform several strengthening exercises without complaint and with some fatigue by end of session  Plan: Progress note during next visit

## 2018-11-01 ENCOUNTER — APPOINTMENT (OUTPATIENT)
Dept: PHYSICAL THERAPY | Facility: CLINIC | Age: 73
End: 2018-11-01
Payer: MEDICARE

## 2018-11-05 ENCOUNTER — EVALUATION (OUTPATIENT)
Dept: PHYSICAL THERAPY | Facility: CLINIC | Age: 73
End: 2018-11-05
Payer: MEDICARE

## 2018-11-05 ENCOUNTER — TRANSCRIBE ORDERS (OUTPATIENT)
Dept: PHYSICAL THERAPY | Facility: CLINIC | Age: 73
End: 2018-11-05

## 2018-11-05 DIAGNOSIS — M25.562 CHRONIC PAIN OF LEFT KNEE: Primary | ICD-10-CM

## 2018-11-05 DIAGNOSIS — G89.29 CHRONIC PAIN OF LEFT KNEE: Primary | ICD-10-CM

## 2018-11-05 DIAGNOSIS — M25.562 ACUTE PAIN OF LEFT KNEE: Primary | ICD-10-CM

## 2018-11-05 PROCEDURE — G8979 MOBILITY GOAL STATUS: HCPCS | Performed by: PHYSICAL THERAPIST

## 2018-11-05 PROCEDURE — G8978 MOBILITY CURRENT STATUS: HCPCS | Performed by: PHYSICAL THERAPIST

## 2018-11-05 PROCEDURE — 97112 NEUROMUSCULAR REEDUCATION: CPT | Performed by: PHYSICAL THERAPIST

## 2018-11-05 NOTE — LETTER
2018    MD Davina Schulzpola [address] [facility]    Patient: Sepideh Alvarez   YOB: 1945   Date of Visit: 2018     Encounter Diagnosis     ICD-10-CM    1  Acute pain of left knee M25 562        Dear Dr Lenka Chappell:    Please review the attached Plan of Care from [facility] recent visit  Please verify that you agree therapy should continue by signing the attached document and sending it back to our office  If you have any questions or concerns, please don't hesitate to call  Sincerely,    Isidra Garcia PT      Referring Provider:      I certify that I have read the below Plan of Care and certify the need for these services furnished under this plan of treatment while under my care  Samy Oakley MD  [facility] 31: [phone]          PT Re-Evaluation     Today's date: 2018  Patient name: Sepideh Alvarez  : 1945  MRN: [id number]  Referring provider: Buzz Milton MD  Dx:   Encounter Diagnosis     ICD-10-CM    1  Acute pain of left knee M25 562        Start Time: 1445  Stop Time: 1530  Total time in clinic (min): 45 minutes    Assessment  Impairments: abnormal or restricted ROM, impaired balance, impaired physical strength, lacks appropriate home exercise program and pain with function    Assessment details: Patient is a 74 y o  female who presents to re-evaluation for L medial posterior meniscus tear  Patient presents to re-evaluation with improved pain levels, range of motion, swelling, hip and knee strength and overall improved tolerance to activity  She continues to have difficulty with stair climbing, kneeling, and floor to standing transfers  Patient is a good candidate for skilled PT, and would benefit from continued skilled PT services to address these impairments, achieve goals, and to maximize function   Thank you for the referral     Understanding of Dx/Px/POC: good   Prognosis: good    Goals  Impairment Goals 4-6 weeks  - Decrease pain to <3/10 - met  - Improve knee AROM to equal to the unaffected lower extremity - partially met  - Increase knee strength to 4+/5 throughout - met  - Increase hip strength to 4+/5 throughout - partially met    Functional Goals 6-8 weeks  - Return to Prior Level of Function - met  - Increase Functional Status Measure (FOTO) to: 58 - met  - Patient will be independent with HEP - partially met  - Patient will be able to kneel without increased pain/compensation/difficulty - partially met  - Patient will be able to walk for prolonged periods without increased pain/compensation/difficulty - met  - Patient will be able to ascend and descend stairs without increased pain/compensation/difficulty - partially met  - Patient will be able to raise from the floor with proper mechanics and without increased pain/compensation/difficulty - partially met    Plan  Patient would benefit from: skilled physical therapy  Planned modality interventions: cryotherapy, electrical stimulation/Russian stimulation, high voltage pulsed current: pain management, high voltage pulsed current: spasm management, H-Wave, TENS, thermotherapy: hydrocollator packs, ultrasound, unattended electrical stimulation and traction  Planned therapy interventions: abdominal trunk stabilization, behavior modification, body mechanics training, breathing training, flexibility, functional ROM exercises, home exercise program, joint mobilization, manual therapy, massage, Olivera taping, muscle pump exercises, neuromuscular re-education, patient education, postural training, strengthening, stretching, therapeutic activities, therapeutic exercise and therapeutic training  Frequency: 2x week  Duration in weeks: 4  Treatment plan discussed with: patient        Subjective Evaluation    History of Present Illness  Mechanism of injury: 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[employer]  HOME LIFE: Lives at home with her   [address] and [place]  Laundry and TV is in the basement 14 steps down  HOBBIES/EXERCISE: Patient has silver sneakers, and was working out there until a few months ago  Patient belongs to choirs and practices in the evening  She enjoys gardening as well  PLOF: No other issues prior to the knee injury  Re-evaluation:  HISTORY OF CURRENT INJURY: Patient reports her L knee is doing "much much better "  PAIN LOCATION/DESCRIPTORS: She has no pain remaining  If she plants and twists her knee, she feels some discomfort  It is no longer excruciating pain  AGGRAVATING FACTORS: Planting and twisting, getting on and off of the floor  Improved: stairs, kneeling is no longer painful, just difficulty, bending the knee, walking  EASES: Sitting  DAY PATTERN: Pain radually increases throughout the day  IMAGING:  X-rays showed minimal arthritis in knees  MRI showed meniscus tear in L posterior medial meniscus  SPECIAL QUESTIONS:  Hx of basal cell cancer 5 years ago  Some pre-cancer now on the top of her head  PATIENT GOALS: Patient wishes to improve her pain levels so she can kneel in the garden and go down steps again  - Patient report she is 75% improved since beginning therapy  She feels doing the stairs faster and getting on and off her knees easier would bring her to 100%  Pain  Current pain ratin  At best pain ratin  At worst pain rating: 3  Quality: discomfort  Progression: improved    Patient Goals  Patient goals for therapy: decreased pain, increased motion, increased strength, independence with ADLs/IADLs and return to sport/leisure activities          Objective     Observations     Additional Observation Details  Patient has mild bilateral Trendelenburg when ambulating, but no antalgic gait      Palpation     Additional Palpation Details  TTP of medial and lateral joint lines bilaterally, L>R    Active Range of Motion   Left Knee   Flexion: 135 degrees   Extension: 0 degrees     Right Knee   Flexion: 139 degrees   Extension: 0 degrees     Strength/Myotome Testing     Left Hip   Planes of Motion   Flexion: 4  Abduction: 4  Adduction: 4+  External rotation: 4+  Internal rotation: 4+    Right Hip   Planes of Motion   Flexion: 4+  Abduction: 4  Adduction: 4+  External rotation: 4+  Internal rotation: 4    Left Knee   Flexion: 4 (discomfort)  Extension: 4+    Right Knee   Flexion: 4+  Extension: 4+    Left Ankle/Foot   Dorsiflexion: 4+  Plantar flexion: 4+  Inversion: 4+  Eversion: 4+    Right Ankle/Foot   Dorsiflexion: 4+  Plantar flexion: 4+  Inversion: 4+  Eversion: 4+    Tests     Left Knee   Positive Thessaly's test at 5 degrees and Thessaly's test at 20 degrees  Negative medial Candie, patellar apprehension, valgus stress test at 30 degrees and varus stress test at 30 degrees  Right Knee   Negative medial Candie, valgus stress test at 30 degrees and varus stress test at 30 degrees       Swelling     Left Knee Girth Measurement (cm)   Joint line: 40 5 cm  10 cm above joint line: 44 cm  10 cm below joint line: 36 cm    Right Knee Girth Measurement (cm)   Joint line: 38 cm  10 cm above joint line: 44 cm  10 cm below joint line: 34 cm    Diagnosis: L medial posterior meniscus tear   Precautions: PMH of skin CA   Manual Therapy 10/29 11/5 10/8 10/12 10/15   Tibial distraction   5 min     Long axis distraction        Patellar mobs   3 min     STM        Fat pad taping   5 min     Exercise Diary  10/29 11/5 10/8 10/12 10/15   Recumbent bike - if tolerated    4 min 4 min   Calf stretch against wall* 3x30 sec ea  3x30 sec ea 3x30 sec ea 3x30 sec ea   Hamstring stretch* 3x30 sec L only  3x30 sec 3x30 sec 3x30 sec L only   Hip adductor stretch* 3x30 sec L only  3x30 sec  3x30 sec 3x30 sec L only   Heel slides* X 20 1 sec holds  10 x 10 sec holds X 20 5 sec holds X 10 5 sec   Quad sets*   X 20, 2 sec hold HEP Clamshells 2x10 with yellow TB       3 way SLR* 2x10 1# SLR only  SLR 3x5 (R) and 3x10 L 3x10 bilaterally  2x10 uriel SLR only   Glute sets   X 20 with quad sets     TA activation with stabilizer   X 20 with cues for breathing HEP    Bridges 3x10 TA activation  3x10 with TA acitvation HEP 2x10 TA activation   Mini squats - hip strategy    2x10 hold on to barre - some knee discomfort Deferred   Step ups 2x10 6" step       Standing 3 way SLR    2x10 ea direction X 10 ea   Balance on foam    30 sec EO/EC narrow and normal KEL    Biodex LOS x 3 static, best 48%   Maze x 2 best 60%    LOS x 3 static, best 54%  Maze x 2 57%   Wobble board 1 min ea    1 min ea   Sit to stand 3x10                               Re-Evaluation  Performed      Modalities        Ice   Declined Declined Declined

## 2018-11-05 NOTE — PROGRESS NOTES
PT Re-Evaluation     Today's date: 2018  Patient name: Hayley Hardin  : 1945  MRN: [id number]  Referring provider: Sebastian Sullivan MD  Dx:   Encounter Diagnosis     ICD-10-CM    1  Acute pain of left knee M25 562        Start Time: 1445  Stop Time: 1530  Total time in clinic (min): 45 minutes    Assessment  Impairments: abnormal or restricted ROM, impaired balance, impaired physical strength, lacks appropriate home exercise program and pain with function    Assessment details: Patient is a 74 y o  female who presents to re-evaluation for L medial posterior meniscus tear  Patient presents to re-evaluation with improved pain levels, range of motion, swelling, hip and knee strength and overall improved tolerance to activity  She continues to have difficulty with stair climbing, kneeling, and floor to standing transfers  Patient is a good candidate for skilled PT, and would benefit from continued skilled PT services to address these impairments, achieve goals, and to maximize function   Thank you for the referral     Understanding of Dx/Px/POC: good   Prognosis: good    Goals  Impairment Goals 4-6 weeks  - Decrease pain to <3/10 - met  - Improve knee AROM to equal to the unaffected lower extremity - partially met  - Increase knee strength to 4+/5 throughout - met  - Increase hip strength to 4+/5 throughout - partially met    Functional Goals 6-8 weeks  - Return to Prior Level of Function - met  - Increase Functional Status Measure (FOTO) to: 58 - met  - Patient will be independent with HEP - partially met  - Patient will be able to kneel without increased pain/compensation/difficulty - partially met  - Patient will be able to walk for prolonged periods without increased pain/compensation/difficulty - met  - Patient will be able to ascend and descend stairs without increased pain/compensation/difficulty - partially met  - Patient will be able to raise from the floor with proper mechanics and without increased pain/compensation/difficulty - partially met    Plan  Patient would benefit from: skilled physical therapy  Planned modality interventions: cryotherapy, electrical stimulation/Russian stimulation, high voltage pulsed current: pain management, high voltage pulsed current: spasm management, H-Wave, TENS, thermotherapy: hydrocollator packs, ultrasound, unattended electrical stimulation and traction  Planned therapy interventions: abdominal trunk stabilization, behavior modification, body mechanics training, breathing training, flexibility, functional ROM exercises, home exercise program, joint mobilization, manual therapy, massage, Olivera taping, muscle pump exercises, neuromuscular re-education, patient education, postural training, strengthening, stretching, therapeutic activities, therapeutic exercise and therapeutic training  Frequency: 2x week  Duration in weeks: 4  Treatment plan discussed with: patient        Subjective Evaluation    History of Present Illness  Mechanism of injury: 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[email or web address]  HOME LIFE: Lives at home with her   [address] [employer] and [place]  Laundry and TV is in the basement 14 steps down  HOBBIES/EXERCISE: Patient has silver sneakers, and was working out there until a few months ago  Patient belongs to choirs and practices in the evening  She enjoys gardening as well  PLOF: No other issues prior to the knee injury  Re-evaluation:  HISTORY OF CURRENT INJURY: Patient reports her L knee is doing "much much better "  PAIN LOCATION/DESCRIPTORS: She has no pain remaining  If she plants and twists her knee, she feels some discomfort  It is no longer excruciating pain  AGGRAVATING FACTORS: Planting and twisting, getting on and off of the floor     Improved: stairs, kneeling is no longer painful, just difficulty, bending the knee, walking  EASES: Sitting  DAY PATTERN: Pain radually increases throughout the day  IMAGING:  X-rays showed minimal arthritis in knees  MRI showed meniscus tear in L posterior medial meniscus  SPECIAL QUESTIONS:  Hx of basal cell cancer 5 years ago  Some pre-cancer now on the top of her head  PATIENT GOALS: Patient wishes to improve her pain levels so she can kneel in the garden and go down steps again  - Patient report she is 75% improved since beginning therapy  She feels doing the stairs faster and getting on and off her knees easier would bring her to 100%  Pain  Current pain ratin  At best pain ratin  At worst pain rating: 3  Quality: discomfort  Progression: improved    Patient Goals  Patient goals for therapy: decreased pain, increased motion, increased strength, independence with ADLs/IADLs and return to sport/leisure activities          Objective     Observations     Additional Observation Details  Patient has mild bilateral Trendelenburg when ambulating, but no antalgic gait  Palpation     Additional Palpation Details  TTP of medial and lateral joint lines bilaterally, L>R    Active Range of Motion   Left Knee   Flexion: 135 degrees   Extension: 0 degrees     Right Knee   Flexion: 139 degrees   Extension: 0 degrees     Strength/Myotome Testing     Left Hip   Planes of Motion   Flexion: 4  Abduction: 4  Adduction: 4+  External rotation: 4+  Internal rotation: 4+    Right Hip   Planes of Motion   Flexion: 4+  Abduction: 4  Adduction: 4+  External rotation: 4+  Internal rotation: 4    Left Knee   Flexion: 4 (discomfort)  Extension: 4+    Right Knee   Flexion: 4+  Extension: 4+    Left Ankle/Foot   Dorsiflexion: 4+  Plantar flexion: 4+  Inversion: 4+  Eversion: 4+    Right Ankle/Foot   Dorsiflexion: 4+  Plantar flexion: 4+  Inversion: 4+  Eversion: 4+    Tests     Left Knee   Positive Thessaly's test at 5 degrees and Thessaly's test at 20 degrees  Negative medial Candie, patellar apprehension, valgus stress test at 30 degrees and varus stress test at 30 degrees  Right Knee   Negative medial Candie, valgus stress test at 30 degrees and varus stress test at 30 degrees  Swelling     Left Knee Girth Measurement (cm)   Joint line: 40 5 cm  10 cm above joint line: 44 cm  10 cm below joint line: 36 cm    Right Knee Girth Measurement (cm)   Joint line: 38 cm  10 cm above joint line: 44 cm  10 cm below joint line: 34 cm    Diagnosis: L medial posterior meniscus tear   Precautions: PMH of skin CA   Manual Therapy 10/29 11/5 10/8 10/12 10/15   Tibial distraction   5 min     Long axis distraction        Patellar mobs   3 min     STM        Fat pad taping   5 min     Exercise Diary  10/29 11/5 10/8 10/12 10/15   Recumbent bike - if tolerated    4 min 4 min   Calf stretch against wall* 3x30 sec ea  3x30 sec ea 3x30 sec ea 3x30 sec ea   Hamstring stretch* 3x30 sec L only  3x30 sec 3x30 sec 3x30 sec L only   Hip adductor stretch* 3x30 sec L only  3x30 sec  3x30 sec 3x30 sec L only   Heel slides* X 20 1 sec holds  10 x 10 sec holds X 20 5 sec holds X 10 5 sec   Quad sets*   X 20, 2 sec hold HEP    Clamshells 2x10 with yellow TB       3 way SLR* 2x10 1# SLR only  SLR 3x5 (R) and 3x10 L 3x10 bilaterally  2x10 uriel SLR only   Glute sets   X 20 with quad sets     TA activation with stabilizer   X 20 with cues for breathing HEP    Bridges 3x10 TA activation  3x10 with TA acitvation HEP 2x10 TA activation   Mini squats - hip strategy    2x10 hold on to barre - some knee discomfort Deferred   Step ups 2x10 6" step       Standing 3 way SLR    2x10 ea direction X 10 ea   Balance on foam    30 sec EO/EC narrow and normal KEL    Biodex LOS x 3 static, best 48%   Maze x 2 best 60%    LOS x 3 static, best 54%  Maze x 2 57%   Wobble board 1 min ea    1 min ea   Sit to stand 3x10                               Re-Evaluation  Performed      Modalities        Ice   Declined Declined Declined

## 2018-11-08 ENCOUNTER — OFFICE VISIT (OUTPATIENT)
Dept: PHYSICAL THERAPY | Facility: CLINIC | Age: 73
End: 2018-11-08
Payer: MEDICARE

## 2018-11-08 DIAGNOSIS — M25.562 ACUTE PAIN OF LEFT KNEE: Primary | ICD-10-CM

## 2018-11-08 PROCEDURE — 97110 THERAPEUTIC EXERCISES: CPT | Performed by: PHYSICAL THERAPIST

## 2018-11-08 PROCEDURE — 97112 NEUROMUSCULAR REEDUCATION: CPT | Performed by: PHYSICAL THERAPIST

## 2018-11-08 NOTE — PROGRESS NOTES
Daily Note     Today's date: 2018  Patient name: Beni Christopher  : 1945  MRN: [id number]  Referring provider: Ney Julian MD  Dx:   Encounter Diagnosis     ICD-10-CM    1  Acute pain of left knee M25 562        Start Time: 1147  Stop Time: 1240  Total time in clinic (min): 53 minutes    Subjective: Patient reports she has been up and down the stairs several times today, and is fatigued  He knee feels good  Objective: See treatment diary below    Diagnosis: L medial posterior meniscus tear   Precautions: PMH of skin CA   Manual Therapy 10/29 11/5 11/8 10/12 10/15   Tibial distraction        Long axis distraction        Patellar mobs        STM        Fat pad taping        Exercise Diary  10/29 11/5 11/8 10/12 10/15   Recumbent bike - if tolerated   2 5 min 4 min 4 min   Calf stretch against wall* 3x30 sec ea   3x30 sec ea 3x30 sec ea   Hamstring stretch* 3x30 sec L only  2x30 sec L 3x30 sec 3x30 sec L only   Hip adductor stretch* 3x30 sec L only  2x30 sec L 3x30 sec 3x30 sec L only   Heel slides* X 20 1 sec holds  X 20 5 sec holds X 20 5 sec holds X 10 5 sec   Quad sets*    HEP    Clamshells 2x10 with yellow TB  3x10 Red TB     3 way SLR* 2x10 1# SLR only  3x10 1 5# SLR only 3x10 bilaterally  2x10 uriel SLR only   Glute sets        TA activation with stabilizer    HEP    Bridges 3x10 TA activation  3x10 HEP 2x10 TA activation   LAQ black TB edge of mat   2x10 ea     Hip flexion black TB edge of mat   2x10 ea     Mini squats - hip strategy    2x10 hold on to barre - some knee discomfort Deferred   Step ups 2x10 6" step  8" step x 20      Standing 3 way SLR   2x10 ea 1 5#  2x10 ea direction X 10 ea   Balance on foam    30 sec EO/EC narrow and normal KEL    Biodex LOS x 3 static, best 48%   Maze x 2 best 60%  LOS x 3 static, best 47%, maze x 2 best 78%  LOS x 3 static, best 54%  Maze x 2 57%   Wobble board 1 min ea  1 min ea  1 min ea   Sit to stand 3x10       FLOOR RECOVERY   Lunge to kneel to stand without support x 10                     Re-Evaluation  Performed      Modalities        Ice    Declined Declined                       Assessment: Tolerated treatment well  Patient exhibited good technique with therapeutic exercises and would benefit from continued PT  She tolerated increased resistance well, without complaints of knee pain  She continues to be challenged by LOS on biodex  Began training to kneel and then stand from the floor to address goal, with best form and least pain to lunge first, kneeling on R knee, and then return to standing without UE support pushing through L knee  Plan: Continue per plan of care

## 2018-11-13 ENCOUNTER — OFFICE VISIT (OUTPATIENT)
Dept: PHYSICAL THERAPY | Facility: CLINIC | Age: 73
End: 2018-11-13
Payer: MEDICARE

## 2018-11-13 DIAGNOSIS — M25.562 ACUTE PAIN OF LEFT KNEE: Primary | ICD-10-CM

## 2018-11-13 PROCEDURE — 97110 THERAPEUTIC EXERCISES: CPT | Performed by: PHYSICAL THERAPIST

## 2018-11-13 PROCEDURE — 97112 NEUROMUSCULAR REEDUCATION: CPT | Performed by: PHYSICAL THERAPIST

## 2018-11-13 NOTE — PROGRESS NOTES
Daily Note     Today's date: 2018  Patient name: Sepideh Alvarez  : 1945  MRN: [id number]  Referring provider: Bzuz Milton MD  Dx:   Encounter Diagnosis     ICD-10-CM    1  Acute pain of left knee M25 562        Start Time: 929  Stop Time:   Total time in clinic (min): 45 minutes    Subjective: Patient reports her L knee feels okay today, and she has been busy all weekend  She was mowing and raking for 2+ hours, which was fatiguing but her knee was fine  She reports she was sore on Saturday and blamed the SLR with increased weight  Objective: See treatment diary below    Diagnosis: L medial posterior meniscus tear   Precautions: PMH of skin CA   Manual Therapy 10/29 11/5 11/8 11/13 10/15   Tibial distraction        Long axis distraction        Patellar mobs        STM        Fat pad taping        Exercise Diary  10/29 11/5 11/8 11/13 10/15   Recumbent bike - if tolerated   2 5 min 3 min bike on 4 min   Calf stretch against wall* 3x30 sec ea    3x30 sec ea   Hamstring stretch* 3x30 sec L only  2x30 sec L 2x30 sec L 3x30 sec L only   Hip adductor stretch* 3x30 sec L only  2x30 sec L 2x30 sec L 3x30 sec L only   ITB stretch    3x30 sec    Heel slides* X 20 1 sec holds  X 20 5 sec holds  X 10 5 sec   Quad sets*        Clamshells 2x10 with yellow TB  3x10 Red TB 3x10 red TB    3 way SLR* 2x10 1# SLR only  3x10 1 5# SLR only 2x10 1 5# SLR only 2x10 uriel SLR only   Glute sets        TA activation with stabilizer        Bridges 3x10 TA activation  3x10  2x10 TA activation   LAQ black TB edge of mat   2x10 ea 3x10 ea    Hip flexion black TB edge of mat   2x10 ea 3x10 ea    Mini squats - hip strategy     Deferred   Step ups 2x10 6" step  8" step x 20      Standing 3 way SLR   2x10 ea 1 5#  2x10 2# ea X 10 ea   Balance on foam        Biodex LOS x 3 static, best 48%   Maze x 2 best 60%  LOS x 3 static, best 47%, maze x 2 best 78% LOS x 3 static, best 41%, maze x 2 best 71%, random control 1 min 93% LOS x 3 static, best 54%  Maze x 2 57%   Wobble board 1 min ea  1 min ea  1 min ea   Sit to stand 3x10       FLOOR RECOVERY   Lunge to kneel to stand without support x 10 Lunge to kneel to stand without support x 4    Leg press    60# 3x10            Re-Evaluation  Performed      Modalities        Ice     Declined                       Assessment: Tolerated treatment well  Patient demonstrated fatigue post treatment and exhibited good technique with therapeutic exercises  Due to DOMS, decreased reps of SLR  Plan to progress again next session if she has less soreness following today's session  She was able to progress other exercises per POC  Plan: Progress treatment as tolerated

## 2018-11-14 ENCOUNTER — OFFICE VISIT (OUTPATIENT)
Dept: PHYSICAL THERAPY | Facility: CLINIC | Age: 73
End: 2018-11-14
Payer: MEDICARE

## 2018-11-14 DIAGNOSIS — M25.562 ACUTE PAIN OF LEFT KNEE: Primary | ICD-10-CM

## 2018-11-14 PROCEDURE — 97110 THERAPEUTIC EXERCISES: CPT | Performed by: PHYSICAL THERAPIST

## 2018-11-14 PROCEDURE — 97112 NEUROMUSCULAR REEDUCATION: CPT | Performed by: PHYSICAL THERAPIST

## 2018-11-14 NOTE — PROGRESS NOTES
Daily Note     Today's date: 2018  Patient name: Larissa Lopez  : 1945  MRN: [id number]  Referring provider: Erwin Paulson MD  Dx:   Encounter Diagnosis     ICD-10-CM    1  Acute pain of left knee M25 562        Start Time: 28  Stop Time: 1530  Total time in clinic (min): 45 minutes    Subjective: Patient reports she is not really sore, and she has been very active all day  Objective: See treatment diary below    Diagnosis: L medial posterior meniscus tear   Precautions: PMH of skin CA   Manual Therapy 10/29 11/5 11/8 11/13 11/15   Tibial distraction        Long axis distraction        Patellar mobs        STM        Fat pad taping        Exercise Diary  10/29 11/5 11/8 11/13 11/15   Recumbent bike - if tolerated   2 5 min 3 min bike on 3 5 min bike on   Calf stretch against wall* 3x30 sec ea       Hamstring stretch* 3x30 sec L only  2x30 sec L 2x30 sec L 3x30 sec L   Hip adductor stretch* 3x30 sec L only  2x30 sec L 2x30 sec L 3x30 sec L   ITB stretch    3x30 sec 2x30 sec   Heel slides* X 20 1 sec holds  X 20 5 sec holds  X 20 5 sec holds   Quad sets*        Clamshells 2x10 with yellow TB  3x10 Red TB 3x10 red TB 3x10 green TB   3 way SLR* 2x10 1# SLR only  3x10 1 5# SLR only 2x10 1 5# SLR only 3x10 1 5# SLR only   Glute sets        TA activation with stabilizer        Bridges 3x10 TA activation  3x10  X 30 with TA and glute activation   LAQ black TB edge of mat   2x10 ea 3x10 ea    Hip flexion black TB edge of mat   2x10 ea 3x10 ea 3x10 ea   Mini squats - hip strategy        Step ups 2x10 6" step  8" step x 20      Standing 3 way SLR   2x10 ea 1 5#  2x10 2# ea 2x10 2 5#   Balance on foam        Biodex LOS x 3 static, best 48%   Maze x 2 best 60%  LOS x 3 static, best 47%, maze x 2 best 78% LOS x 3 static, best 41%, maze x 2 best 71%, random control 1 min 93% LOS x 3 static, best 46%, maze x 2 best 71%, random control 1 min 98%   Wobble board 1 min ea  1 min ea  1 min ea   Sit to stand 3x10       FLOOR RECOVERY   Lunge to kneel to stand without support x 10 Lunge to kneel to stand without support x 4 Lunge to kneel to stand without support x 5   Leg press    60# 3x10 75# 3x10           Re-Evaluation  Performed      Modalities        Ice                            Assessment: Tolerated treatment well  Patient exhibited good technique with therapeutic exercises and tolerated progressions of several strengthening exercises well with no complaints of pain  She continues to have difficulty with some balance exercises, though transfers from the floor are improving  Plan: Progress treatment as tolerated

## 2018-11-15 ENCOUNTER — APPOINTMENT (OUTPATIENT)
Dept: PHYSICAL THERAPY | Facility: CLINIC | Age: 73
End: 2018-11-15
Payer: MEDICARE

## 2018-11-23 ENCOUNTER — OFFICE VISIT (OUTPATIENT)
Dept: PHYSICAL THERAPY | Facility: CLINIC | Age: 73
End: 2018-11-23
Payer: MEDICARE

## 2018-11-23 DIAGNOSIS — M25.562 ACUTE PAIN OF LEFT KNEE: Primary | ICD-10-CM

## 2018-11-23 PROCEDURE — 97112 NEUROMUSCULAR REEDUCATION: CPT | Performed by: PHYSICAL THERAPIST

## 2018-11-23 PROCEDURE — 97110 THERAPEUTIC EXERCISES: CPT | Performed by: PHYSICAL THERAPIST

## 2018-11-23 NOTE — PROGRESS NOTES
Daily Note     Today's date: 2018  Patient name: Anthony Larios  : 1945  MRN: [id number]  Referring provider: Tushar Kyle MD  Dx:   Encounter Diagnosis     ICD-10-CM    1  Acute pain of left knee M25 562        Start Time: 1100  Stop Time: 1145  Total time in clinic (min): 45 minutes    Subjective: Patient is tired from Thanksgiving, as she had to cook most of the meal  She reports she has not been doing the exercises at home consistently, and feels more stiff going down the stairs in L knee         Objective: See treatment diary below    Diagnosis: L medial posterior meniscus tear   Precautions: PMH of skin CA   Manual Therapy 11/23 11/5 11/8 11/13 11/15   Tibial distraction        Long axis distraction        Patellar mobs        STM        Fat pad taping        Exercise Diary  11/23 11/5 11/8 11/13 11/15   Recumbent bike - if tolerated 3 5 min bike on  2 5 min 3 min bike on 3 5 min bike on   Calf stretch against wall*        Hamstring stretch* 3x30 sec L  2x30 sec L 2x30 sec L 3x30 sec L   Hip adductor stretch* 3x30 sec L  2x30 sec L 2x30 sec L 3x30 sec L   ITB stretch 3x30 sec L   3x30 sec 2x30 sec   Heel slides* X 30 5 sec holds  X 20 5 sec holds  X 20 5 sec holds   Quad sets*        Clamshells 3x10 green TB  3x10 Red TB 3x10 red TB 3x10 green TB   3 way SLR* 3x10 2# SLR only  3x10 1 5# SLR only 2x10 1 5# SLR only 3x10 1 5# SLR only   Glute sets        TA activation with stabilizer        Bridges   3x10  X 30 with TA and glute activation   LAQ black TB edge of mat   2x10 ea 3x10 ea    Hip flexion black TB edge of mat   2x10 ea 3x10 ea 3x10 ea   Mini squats - hip strategy        Step ups   8" step x 20      Standing 3 way SLR 3x10 2 5#  2x10 ea 1 5#  2x10 2# ea 2x10 2 5#   Balance on foam        Biodex LOS x 3 static, best 38%, maze x 2 best 75%, random control 1 min faster [place] 80%  LOS x 3 static, best 47%, maze x 2 best 78% LOS x 3 static, best 41%, maze x 2 best 71%, random control 1 min 93% LOS x 3 static, best 46%, maze x 2 best 71%, random control 1 min 98%   Wobble board 1 min ea  1 min ea  1 min ea   Sit to stand        FLOOR RECOVERY   Lunge to kneel to stand without support x 10 Lunge to kneel to stand without support x 4 Lunge to kneel to stand without support x 5   Leg press 90# 3x10   60# 3x10 75# 3x10           Re-Evaluation  Performed      Modalities        Ice                            Assessment: Tolerated treatment well  Patient demonstrated fatigue post treatment, exhibited good technique with therapeutic exercises and HEP was performed in therapy as she has not been consistent with this program at home  She reports improved symptoms with exercises  PT encouraged patient to keep up with HEP once discharged to prevent reoccurrence of pain  Plan: Continue per plan of care

## 2018-11-26 ENCOUNTER — OFFICE VISIT (OUTPATIENT)
Dept: PHYSICAL THERAPY | Facility: CLINIC | Age: 73
End: 2018-11-26
Payer: MEDICARE

## 2018-11-26 DIAGNOSIS — M25.562 ACUTE PAIN OF LEFT KNEE: Primary | ICD-10-CM

## 2018-11-26 PROCEDURE — 97112 NEUROMUSCULAR REEDUCATION: CPT | Performed by: PHYSICAL THERAPIST

## 2018-11-26 PROCEDURE — 97110 THERAPEUTIC EXERCISES: CPT | Performed by: PHYSICAL THERAPIST

## 2018-11-26 NOTE — PROGRESS NOTES
Daily Note     Today's date: 2018  Patient name: Jada Woodruff  : 1945  MRN: [id number]  Referring provider: Thanh Staton MD  Dx:   Encounter Diagnosis     ICD-10-CM    1  Acute pain of left knee M25 562        Start Time: 845  Stop Time: 930  Total time in clinic (min): 45 minutes    Subjective: Patient reports the knee is okay, and she is fatigued with the bike exercise   Patient is not doing HEP at home, stating "I don't know why, I just can't find the time!"       Objective: See treatment diary below    Diagnosis: L medial posterior meniscus tear   Precautions: PMH of skin CA   Manual Therapy 11/23 11/26 11/8 11/13 11/15   Tibial distraction        Long axis distraction        Patellar mobs        STM        Fat pad taping        Exercise Diary  11/23 11/26 11/8 11/13 11/15   Recumbent bike - if tolerated 3 5 min bike on 3 5 min bike on 2 5 min 3 min bike on 3 5 min bike on   Calf stretch against wall*        Hamstring stretch* 3x30 sec L 3x30 sec L 2x30 sec L 2x30 sec L 3x30 sec L   Hip adductor stretch* 3x30 sec L 3x30 sec L 2x30 sec L 2x30 sec L 3x30 sec L   ITB stretch 3x30 sec L 3x30 sec L  3x30 sec 2x30 sec   Heel slides* X 30 5 sec holds X 30 5 sec holds X 20 5 sec holds  X 20 5 sec holds   Quad sets*        Clamshells 3x10 green TB 3x10 blue TB 3x10 Red TB 3x10 red TB 3x10 green TB   3 way SLR* 3x10 2# SLR only 3x8 2 5# SLR only 3x10 1 5# SLR only 2x10 1 5# SLR only 3x10 1 5# SLR only   Glute sets        TA activation with stabilizer        Bridges   3x10  X 30 with TA and glute activation   LAQ black TB edge of mat  3x10 ea 2x10 ea 3x10 ea    Hip flexion black TB edge of mat  3x10 ea 2x10 ea 3x10 ea 3x10 ea   Mini squats - hip strategy        Step ups   8" step x 20      Standing 3 way SLR 3x10 2 5#  2x10 ea 1 5#  2x10 2# ea 2x10 2 5#   Balance on foam        Biodex LOS x 3 static, best 38%, maze x 2 best 75%, random control 1 min faster [place] 80% LOS x 3 static, best 49%, maze x 2 best 82%, random control 1 min faster [place] 80% LOS x 3 static, best 47%, maze x 2 best 78% LOS x 3 static, best 41%, maze x 2 best 71%, random control 1 min 93% LOS x 3 static, best 46%, maze x 2 best 71%, random control 1 min 98%   Wobble board 1 min ea  1 min ea  1 min ea   Sit to stand        FLOOR RECOVERY  Lunge to kneel to stand without support x 3 Lunge to kneel to stand without support x 10 Lunge to kneel to stand without support x 4 Lunge to kneel to stand without support x 5   Leg press 90# 3x10 110# 3x10  60# 3x10 75# 3x10           Re-Evaluation        Modalities        Ice                            Assessment: Tolerated treatment well  Patient exhibited good technique with therapeutic exercises and HEP was again performed during therapy as carito does not do exercises at home  Education provided about the importance of HEP  She was able to progress resistance for several exercises today with some muscular fatigue but without pain  Anticipated DC in one week  Plan: Continue per plan of care

## 2018-11-29 ENCOUNTER — OFFICE VISIT (OUTPATIENT)
Dept: PHYSICAL THERAPY | Facility: CLINIC | Age: 73
End: 2018-11-29
Payer: MEDICARE

## 2018-11-29 DIAGNOSIS — M25.562 ACUTE PAIN OF LEFT KNEE: Primary | ICD-10-CM

## 2018-11-29 PROCEDURE — 97110 THERAPEUTIC EXERCISES: CPT | Performed by: PHYSICAL THERAPIST

## 2018-11-29 PROCEDURE — 97112 NEUROMUSCULAR REEDUCATION: CPT | Performed by: PHYSICAL THERAPIST

## 2018-11-29 NOTE — PROGRESS NOTES
Daily Note     Today's date: 2018  Patient name: Leslie Hi  : 1945  MRN: [id number]  Referring provider: Lamount Cogan, MD  Dx:   Encounter Diagnosis     ICD-10-CM    1  Acute pain of left knee M25 562        Start Time: 1700  Stop Time: 1745  Total time in clinic (min): 45 minutes    Subjective: Patient reports her knee feels good but there is "something funky" going on with her L ankle         Objective: See treatment diary below    Diagnosis: L medial posterior meniscus tear   Precautions: PMH of skin CA   Manual Therapy 11/23 11/26 11/29 11/13 11/15   Tibial distraction        Long axis distraction        Patellar mobs        STM        Fat pad taping        Exercise Diary  11/23 11/26 11/29 11/13 11/15   Recumbent bike - if tolerated 3 5 min bike on 3 5 min bike on 3 5 min bike on 3 min bike on 3 5 min bike on   Calf stretch against wall*        Hamstring stretch* 3x30 sec L 3x30 sec L 3x30 sec L 2x30 sec L 3x30 sec L   Hip adductor stretch* 3x30 sec L 3x30 sec L 3x30 sec L 2x30 sec L 3x30 sec L   ITB stretch 3x30 sec L 3x30 sec L 3x30 sec L 3x30 sec 2x30 sec   Heel slides* X 30 5 sec holds X 30 5 sec holds X 30 5 sec  X 20 5 sec holds   Quad sets*        Clamshells 3x10 green TB 3x10 blue TB 3x10 blue 3x10 red TB 3x10 green TB   3 way SLR* 3x10 2# SLR only 3x8 2 5# SLR only  2x10 1 5# SLR only 3x10 1 5# SLR only   Glute sets        TA activation with stabilizer        Bridges     X 30 with TA and glute activation   LAQ black TB edge of mat  3x10 ea 3x10 ea 3x10 ea    Hip flexion black TB edge of mat  3x10 ea 3x10 ea 3x10 ea 3x10 ea   Mini squats - hip strategy        Step ups        Standing 3 way SLR 3x10 2 5#   2x10 2# ea 2x10 2 5#   Heel raises   2x20     Biodex LOS x 3 static, best 38%, maze x 2 best 75%, random control 1 min faster [place] 80% LOS x 3 static, best 49%, maze x 2 best 82%, random control 1 min faster [place] 80% LOS x 3 static, best 59%, maze x 3 best 89%, random control 1 min faster [place] 83% LOS x 3 static, best 41%, maze x 2 best 71%, random control 1 min 93% LOS x 3 static, best 46%, maze x 2 best 71%, random control 1 min 98%   Wobble board 1 min ea  2 min  1 min ea   Sit to stand        FLOOR RECOVERY  Lunge to kneel to stand without support x 3  Lunge to kneel to stand without support x 4 Lunge to kneel to stand without support x 5   Leg press 90# 3x10 110# 3x10 110# 2x20 60# 3x10 75# 3x10   SLS on foam    2x30 sec ea     Re-Evaluation        Modalities        Ice                            Assessment: Tolerated treatment well  Patient exhibited good technique with therapeutic exercises and had no pain throughout session  POC was discussed today, and printed HEP was provided  Plan to DC next visit  Plan: Potential discharge next visit

## 2018-12-03 ENCOUNTER — EVALUATION (OUTPATIENT)
Dept: PHYSICAL THERAPY | Facility: CLINIC | Age: 73
End: 2018-12-03
Payer: MEDICARE

## 2018-12-03 DIAGNOSIS — M25.562 ACUTE PAIN OF LEFT KNEE: Primary | ICD-10-CM

## 2018-12-03 PROCEDURE — 97112 NEUROMUSCULAR REEDUCATION: CPT | Performed by: PHYSICAL THERAPIST

## 2018-12-03 PROCEDURE — G8979 MOBILITY GOAL STATUS: HCPCS | Performed by: PHYSICAL THERAPIST

## 2018-12-03 PROCEDURE — G8980 MOBILITY D/C STATUS: HCPCS | Performed by: PHYSICAL THERAPIST

## 2018-12-03 RX ORDER — LISINOPRIL 5 MG/1
5 TABLET ORAL DAILY
COMMUNITY
End: 2021-10-06

## 2018-12-03 NOTE — PROGRESS NOTES
PT Discharge    Today's date: 12/3/2018  Patient name: Sepideh Alvarez  : 1945  MRN: [id number]  Referring provider: Buzz Milton MD  Dx:   Encounter Diagnosis     ICD-10-CM    1  Acute pain of left knee M25 562        Start Time: 1400  Stop Time: 1438  Total time in clinic (min): 38 minutes    Assessment  Assessment details: Sepideh Alvarez has been compliant with attending physical therapy and completing home exercise program since initial evaluation  [place]  has made improvements in objective data since initial evalulation and has achieved all goals  Patient reports having returned to their prior level of function  Patient provided with updated Home Exercise Program, all questions answered, and verbalized understanding, agreeing to plan of care   Thus it was mutually decided to discontinue this episode of care and transition to Home Exercise Program       Impairments: impaired physical strength and lacks appropriate home exercise program  Understanding of Dx/Px/POC: good   Prognosis: good    Goals  Impairment Goals 4-6 weeks  - Decrease pain to <3/10 - met  - Improve knee AROM to equal to the unaffected lower extremity - met  - Increase knee strength to 4+/5 throughout - met  - Increase hip strength to 4+/5 throughout - met    Functional Goals 6-8 weeks  - Return to Prior Level of Function - met  - Increase Functional Status Measure (FOTO) to: 58 - met  - Patient will be independent with HEP - met  - Patient will be able to kneel without increased pain/compensation/difficulty - met  - Patient will be able to walk for prolonged periods without increased pain/compensation/difficulty - met  - Patient will be able to ascend and descend stairs without increased pain/compensation/difficulty - partially met  - Patient will be able to raise from the floor with proper mechanics and without increased pain/compensation/difficulty - met    Plan  Planned therapy interventions: home exercise program  Treatment plan discussed with: patient        Subjective Evaluation    History of Present Illness  Mechanism of injury: 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[employer]  HOME LIFE: Lives at home with her   2 [address] and [place]  Laundry and TV is in the basement 14 steps down  HOBBIES/EXERCISE: Patient has silver sneakers, and was working out there until a few months ago  Patient belongs to choirs and practices in the evening  She enjoys gardening as well  PLOF: No other issues prior to the knee injury  Re-evaluation:  HISTORY OF CURRENT INJURY: Patient reports her L knee is doing fine, but she has some pain when she goes down the steps  PAIN LOCATION/DESCRIPTORS: "I know it's there " Pain is in the posterior L knee  AGGRAVATING FACTORS: Stairs, planting and twisting  Improved: stairs, kneeling is no longer painful, bending the knee, walking, getting on and off of the floor  EASES: Sitting  DAY PATTERN: Pain radually increases throughout the day  IMAGING:  X-rays showed minimal arthritis in knees  MRI showed meniscus tear in L posterior medial meniscus  SPECIAL QUESTIONS:  Hx of basal cell cancer 5 years ago  Some pre-cancer now on the top of her head  PATIENT GOALS: Patient wishes to improve her pain levels so she can kneel in the garden and go down steps again  - Patient report she is 90% improved since beginning therapy  Pain  Current pain ratin  At best pain ratin  At worst pain rating: 3  Quality: discomfort  Progression: improved    Patient Goals  Patient goals for therapy: decreased pain, increased motion, increased strength, independence with ADLs/IADLs and return to sport/leisure activities          Objective     Observations     Additional Observation Details  Patient has mild bilateral Trendelenburg when ambulating, but no antalgic gait      Palpation     Additional Palpation Details  TTP of medial joint lines bilaterally    Active Range of Motion   Left Knee Flexion: 135 degrees   Extension: 0 degrees     Right Knee   Flexion: 135 degrees   Extension: 0 degrees     Strength/Myotome Testing     Left Hip   Planes of Motion   Flexion: 4+  Abduction: 4+  Adduction: 4+  External rotation: 4+  Internal rotation: 4+    Right Hip   Planes of Motion   Flexion: 4+  Abduction: 4  Adduction: 4+  External rotation: 4+  Internal rotation: 4+    Left Knee   Flexion: 4+  Extension: 4+    Right Knee   Flexion: 4+  Extension: 4+    Left Ankle/Foot   Dorsiflexion: 4+  Plantar flexion: 4+  Inversion: 4+  Eversion: 4+    Right Ankle/Foot   Dorsiflexion: 4+  Plantar flexion: 4+  Inversion: 4+  Eversion: 4+    Tests     Left Knee   Positive Thessaly's test at 5 degrees and Thessaly's test at 20 degrees  Negative medial Candie, patellar apprehension, valgus stress test at 30 degrees and varus stress test at 30 degrees  Right Knee   Negative medial Candie, valgus stress test at 30 degrees and varus stress test at 30 degrees       Swelling     Left Knee Girth Measurement (cm)   Joint line: 39 5 cm  10 cm above joint line: 44 cm  10 cm below joint line: 36 cm    Right Knee Girth Measurement (cm)   Joint line: 38 cm  10 cm above joint line: 44 cm  10 cm below joint line: 35 5 cm    Diagnosis: L medial posterior meniscus tear   Precautions: PMH of skin CA   Manual Therapy 11/23 11/26 11/29 12/3 11/15   Tibial distraction        Long axis distraction        Patellar mobs        STM        Fat pad taping        Exercise Diary  11/23 11/26 11/29 12/3 11/15   Recumbent bike - if tolerated 3 5 min bike on 3 5 min bike on 3 5 min bike on  3 5 min bike on   Calf stretch against wall*        Hamstring stretch* 3x30 sec L 3x30 sec L 3x30 sec L  3x30 sec L   Hip adductor stretch* 3x30 sec L 3x30 sec L 3x30 sec L  3x30 sec L   ITB stretch 3x30 sec L 3x30 sec L 3x30 sec L  2x30 sec   Heel slides* X 30 5 sec holds X 30 5 sec holds X 30 5 sec  X 20 5 sec holds   Quad sets*        Clamshells 3x10 green TB 3x10 blue TB 3x10 blue  3x10 green TB   3 way SLR* 3x10 2# SLR only 3x8 2 5# SLR only   3x10 1 5# SLR only   Glute sets        TA activation with stabilizer        Bridges     X 30 with TA and glute activation   LAQ black TB edge of mat  3x10 ea 3x10 ea     Hip flexion black TB edge of mat  3x10 ea 3x10 ea  3x10 ea   Mini squats - hip strategy        Step ups        Standing 3 way SLR 3x10 2 5#    2x10 2 5#   Heel raises   2x20     Biodex LOS x 3 static, best 38%, maze x 2 best 75%, random control 1 min faster [place] 80% LOS x 3 static, best 49%, maze x 2 best 82%, random control 1 min faster [place] 80% LOS x 3 static, best 59%, maze x 3 best 89%, random control 1 min faster [place] 83%  LOS x 3 static, best 46%, maze x 2 best 71%, random control 1 min 98%   Wobble board 1 min ea  2 min  1 min ea   Sit to stand        FLOOR RECOVERY  Lunge to kneel to stand without support x 3   Lunge to kneel to stand without support x 5   Leg press 90# 3x10 110# 3x10 110# 2x20  75# 3x10   SLS on foam    2x30 sec ea     Re-Evaluation    Discharge performed, FOTO, review of HEP    Modalities        Ice

## 2019-02-11 ENCOUNTER — APPOINTMENT (OUTPATIENT)
Dept: LAB | Facility: CLINIC | Age: 74
End: 2019-02-11
Payer: MEDICARE

## 2019-02-11 ENCOUNTER — TRANSCRIBE ORDERS (OUTPATIENT)
Dept: LAB | Facility: CLINIC | Age: 74
End: 2019-02-11

## 2019-02-11 DIAGNOSIS — Z00.00 ROUTINE GENERAL MEDICAL EXAMINATION AT A HEALTH CARE FACILITY: ICD-10-CM

## 2019-02-11 DIAGNOSIS — Z00.00 ROUTINE GENERAL MEDICAL EXAMINATION AT A HEALTH CARE FACILITY: Primary | ICD-10-CM

## 2019-02-11 LAB
ALBUMIN SERPL BCP-MCNC: 3.6 G/DL (ref 3.5–5)
ALP SERPL-CCNC: 39 U/L (ref 46–116)
ALT SERPL W P-5'-P-CCNC: 23 U/L (ref 12–78)
ANION GAP SERPL CALCULATED.3IONS-SCNC: 4 MMOL/L (ref 4–13)
AST SERPL W P-5'-P-CCNC: 21 U/L (ref 5–45)
BILIRUB SERPL-MCNC: 0.55 MG/DL (ref 0.2–1)
BUN SERPL-MCNC: 21 MG/DL (ref 5–25)
CALCIUM SERPL-MCNC: 9.7 MG/DL (ref 8.3–10.1)
CHLORIDE SERPL-SCNC: 108 MMOL/L (ref 100–108)
CHOLEST SERPL-MCNC: 173 MG/DL (ref 50–200)
CO2 SERPL-SCNC: 29 MMOL/L (ref 21–32)
CREAT SERPL-MCNC: 0.77 MG/DL (ref 0.6–1.3)
GFR SERPL CREATININE-BSD FRML MDRD: 76 ML/MIN/1.73SQ M
GLUCOSE P FAST SERPL-MCNC: 89 MG/DL (ref 65–99)
HDLC SERPL-MCNC: 72 MG/DL (ref 40–60)
LDLC SERPL CALC-MCNC: 86 MG/DL (ref 0–100)
NONHDLC SERPL-MCNC: 101 MG/DL
POTASSIUM SERPL-SCNC: 3.5 MMOL/L (ref 3.5–5.3)
PROT SERPL-MCNC: 6.3 G/DL (ref 6.4–8.2)
SODIUM SERPL-SCNC: 141 MMOL/L (ref 136–145)
TRIGL SERPL-MCNC: 76 MG/DL
TSH SERPL DL<=0.05 MIU/L-ACNC: 2.64 UIU/ML (ref 0.36–3.74)

## 2019-02-11 PROCEDURE — 80053 COMPREHEN METABOLIC PANEL: CPT

## 2019-02-11 PROCEDURE — 36415 COLL VENOUS BLD VENIPUNCTURE: CPT

## 2019-02-11 PROCEDURE — 84443 ASSAY THYROID STIM HORMONE: CPT

## 2019-02-11 PROCEDURE — 80061 LIPID PANEL: CPT

## 2019-09-11 ENCOUNTER — APPOINTMENT (OUTPATIENT)
Dept: LAB | Facility: CLINIC | Age: 74
End: 2019-09-11
Payer: MEDICARE

## 2019-09-11 ENCOUNTER — TRANSCRIBE ORDERS (OUTPATIENT)
Dept: LAB | Facility: CLINIC | Age: 74
End: 2019-09-11

## 2019-09-11 DIAGNOSIS — E78.5 HYPERLIPIDEMIA, UNSPECIFIED HYPERLIPIDEMIA TYPE: Primary | ICD-10-CM

## 2019-09-11 DIAGNOSIS — R53.83 FATIGUE, UNSPECIFIED TYPE: ICD-10-CM

## 2019-09-11 DIAGNOSIS — I10 ESSENTIAL HYPERTENSION, MALIGNANT: ICD-10-CM

## 2019-09-11 DIAGNOSIS — E78.5 HYPERLIPIDEMIA, UNSPECIFIED HYPERLIPIDEMIA TYPE: ICD-10-CM

## 2019-09-11 LAB
ALBUMIN SERPL BCP-MCNC: 3.7 G/DL (ref 3.5–5)
ALP SERPL-CCNC: 41 U/L (ref 46–116)
ALT SERPL W P-5'-P-CCNC: 28 U/L (ref 12–78)
ANION GAP SERPL CALCULATED.3IONS-SCNC: 5 MMOL/L (ref 4–13)
AST SERPL W P-5'-P-CCNC: 22 U/L (ref 5–45)
BASOPHILS # BLD AUTO: 0.03 THOUSANDS/ΜL (ref 0–0.1)
BASOPHILS NFR BLD AUTO: 1 % (ref 0–1)
BILIRUB SERPL-MCNC: 0.59 MG/DL (ref 0.2–1)
BUN SERPL-MCNC: 14 MG/DL (ref 5–25)
CALCIUM ALBUM COR SERPL-MCNC: 10.4 MG/DL (ref 8.3–10.1)
CALCIUM SERPL-MCNC: 10.2 MG/DL (ref 8.3–10.1)
CHLORIDE SERPL-SCNC: 109 MMOL/L (ref 100–108)
CHOLEST SERPL-MCNC: 180 MG/DL (ref 50–200)
CO2 SERPL-SCNC: 28 MMOL/L (ref 21–32)
CREAT SERPL-MCNC: 0.82 MG/DL (ref 0.6–1.3)
EOSINOPHIL # BLD AUTO: 0.39 THOUSAND/ΜL (ref 0–0.61)
EOSINOPHIL NFR BLD AUTO: 7 % (ref 0–6)
ERYTHROCYTE [DISTWIDTH] IN BLOOD BY AUTOMATED COUNT: 13.6 % (ref 11.6–15.1)
GFR SERPL CREATININE-BSD FRML MDRD: 70 ML/MIN/1.73SQ M
GLUCOSE P FAST SERPL-MCNC: 90 MG/DL (ref 65–99)
HCT VFR BLD AUTO: 38.7 % (ref 34.8–46.1)
HDLC SERPL-MCNC: 69 MG/DL (ref 40–60)
HGB BLD-MCNC: 12.8 G/DL (ref 11.5–15.4)
IMM GRANULOCYTES # BLD AUTO: 0.01 THOUSAND/UL (ref 0–0.2)
IMM GRANULOCYTES NFR BLD AUTO: 0 % (ref 0–2)
LDLC SERPL CALC-MCNC: 87 MG/DL (ref 0–100)
LYMPHOCYTES # BLD AUTO: 1.72 THOUSANDS/ΜL (ref 0.6–4.47)
LYMPHOCYTES NFR BLD AUTO: 31 % (ref 14–44)
MCH RBC QN AUTO: 29.2 PG (ref 26.8–34.3)
MCHC RBC AUTO-ENTMCNC: 33.1 G/DL (ref 31.4–37.4)
MCV RBC AUTO: 88 FL (ref 82–98)
MONOCYTES # BLD AUTO: 0.63 THOUSAND/ΜL (ref 0.17–1.22)
MONOCYTES NFR BLD AUTO: 11 % (ref 4–12)
NEUTROPHILS # BLD AUTO: 2.78 THOUSANDS/ΜL (ref 1.85–7.62)
NEUTS SEG NFR BLD AUTO: 50 % (ref 43–75)
NONHDLC SERPL-MCNC: 111 MG/DL
NRBC BLD AUTO-RTO: 0 /100 WBCS
PLATELET # BLD AUTO: 246 THOUSANDS/UL (ref 149–390)
PMV BLD AUTO: 11.6 FL (ref 8.9–12.7)
POTASSIUM SERPL-SCNC: 3.6 MMOL/L (ref 3.5–5.3)
PROT SERPL-MCNC: 6.4 G/DL (ref 6.4–8.2)
RBC # BLD AUTO: 4.38 MILLION/UL (ref 3.81–5.12)
SODIUM SERPL-SCNC: 142 MMOL/L (ref 136–145)
TRIGL SERPL-MCNC: 122 MG/DL
TSH SERPL DL<=0.05 MIU/L-ACNC: 3.31 UIU/ML (ref 0.36–3.74)
WBC # BLD AUTO: 5.56 THOUSAND/UL (ref 4.31–10.16)

## 2019-09-11 PROCEDURE — 36415 COLL VENOUS BLD VENIPUNCTURE: CPT

## 2019-09-11 PROCEDURE — 85025 COMPLETE CBC W/AUTO DIFF WBC: CPT

## 2019-09-11 PROCEDURE — 80053 COMPREHEN METABOLIC PANEL: CPT

## 2019-09-11 PROCEDURE — 80061 LIPID PANEL: CPT

## 2019-09-11 PROCEDURE — 84443 ASSAY THYROID STIM HORMONE: CPT

## 2019-09-19 ENCOUNTER — APPOINTMENT (OUTPATIENT)
Dept: LAB | Facility: CLINIC | Age: 74
End: 2019-09-19
Payer: MEDICARE

## 2019-09-19 ENCOUNTER — TRANSCRIBE ORDERS (OUTPATIENT)
Dept: LAB | Facility: CLINIC | Age: 74
End: 2019-09-19

## 2019-09-19 DIAGNOSIS — E83.52 HYPERCALCEMIA: ICD-10-CM

## 2019-09-19 DIAGNOSIS — E83.52 HYPERCALCEMIA: Primary | ICD-10-CM

## 2019-09-19 LAB
ALBUMIN SERPL BCP-MCNC: 4.2 G/DL (ref 3.5–5)
CALCIUM ALBUM COR SERPL-MCNC: 10.3 MG/DL (ref 8.3–10.1)
CALCIUM SERPL-MCNC: 10.5 MG/DL (ref 8.3–10.1)
CALCIUM SERPL-MCNC: 10.5 MG/DL (ref 8.3–10.1)
PTH-INTACT SERPL-MCNC: 73.9 PG/ML (ref 18.4–80.1)

## 2019-09-19 PROCEDURE — 82310 ASSAY OF CALCIUM: CPT

## 2019-09-19 PROCEDURE — 83970 ASSAY OF PARATHORMONE: CPT

## 2019-09-19 PROCEDURE — 36415 COLL VENOUS BLD VENIPUNCTURE: CPT

## 2019-09-19 PROCEDURE — 82040 ASSAY OF SERUM ALBUMIN: CPT

## 2020-01-02 ENCOUNTER — TRANSCRIBE ORDERS (OUTPATIENT)
Dept: LAB | Facility: CLINIC | Age: 75
End: 2020-01-02

## 2020-01-02 ENCOUNTER — APPOINTMENT (OUTPATIENT)
Dept: LAB | Facility: CLINIC | Age: 75
End: 2020-01-02
Payer: MEDICARE

## 2020-01-02 DIAGNOSIS — E83.52 HYPERCALCEMIA: Primary | ICD-10-CM

## 2020-01-02 DIAGNOSIS — E83.52 HYPERCALCEMIA: ICD-10-CM

## 2020-01-02 LAB
ALBUMIN SERPL BCP-MCNC: 3.5 G/DL (ref 3.5–5)
CALCIUM ALBUM COR SERPL-MCNC: 10.8 MG/DL (ref 8.3–10.1)
CALCIUM SERPL-MCNC: 10.4 MG/DL (ref 8.3–10.1)
CALCIUM SERPL-MCNC: 10.4 MG/DL (ref 8.3–10.1)

## 2020-01-02 PROCEDURE — 82310 ASSAY OF CALCIUM: CPT

## 2020-01-02 PROCEDURE — 82040 ASSAY OF SERUM ALBUMIN: CPT

## 2020-01-02 PROCEDURE — 36415 COLL VENOUS BLD VENIPUNCTURE: CPT

## 2020-03-05 ENCOUNTER — OFFICE VISIT (OUTPATIENT)
Dept: PODIATRY | Facility: CLINIC | Age: 75
End: 2020-03-05
Payer: MEDICARE

## 2020-03-05 VITALS — BODY MASS INDEX: 25.76 KG/M2 | RESPIRATION RATE: 16 BRPM | HEIGHT: 62 IN | WEIGHT: 140 LBS

## 2020-03-05 DIAGNOSIS — M25.571 ARTHRALGIA OF BOTH FEET: ICD-10-CM

## 2020-03-05 DIAGNOSIS — B35.1 ONYCHOMYCOSIS: ICD-10-CM

## 2020-03-05 DIAGNOSIS — I83.813 VARICOSE VEINS OF BILATERAL LOWER EXTREMITIES WITH PAIN: ICD-10-CM

## 2020-03-05 DIAGNOSIS — M79.671 PAIN IN BOTH FEET: Primary | ICD-10-CM

## 2020-03-05 DIAGNOSIS — M77.41 METATARSALGIA OF BOTH FEET: ICD-10-CM

## 2020-03-05 DIAGNOSIS — M77.42 METATARSALGIA OF BOTH FEET: ICD-10-CM

## 2020-03-05 DIAGNOSIS — M79.672 PAIN IN BOTH FEET: Primary | ICD-10-CM

## 2020-03-05 DIAGNOSIS — M25.572 ARTHRALGIA OF BOTH FEET: ICD-10-CM

## 2020-03-05 PROCEDURE — 99213 OFFICE O/P EST LOW 20 MIN: CPT | Performed by: PODIATRIST

## 2020-03-18 NOTE — PROGRESS NOTES
Assessment/Plan:  Patient evaluated treatment options discussed with the patient,  patient educated on proper shoe gear, and the use orthotics to accommodate for patient pain and deformity, aseptic debridement toenails right foot, patient educated on oral and topical anti fungal medication, topical anti fungal medication dispensed, patient educated on its use  Patient use OTC pain medication, patient states she is using naproxen for arthralgia       Diagnoses and all orders for this visit:    Pain in both feet    Arthralgia of both feet    Metatarsalgia of both feet    Varicose veins of bilateral lower extremities with pain    Onychomycosis          Subjective:      Patient ID: Ana Cristina Caraballo is a 66 y o  female  78-year-old female past medical history of atherosclerosis,  arthritis presents to the office pain to the bilateral foot, also pain thickened discolored toenail on the right she was unable to self manage due to the history of arthritis      The following portions of the patient's history were reviewed and updated as appropriate: allergies, current medications, past family history, past medical history, past social history, past surgical history and problem list     Review of Systems   Constitutional: Negative  Respiratory: Negative  Cardiovascular: Negative  Musculoskeletal: Positive for arthralgias and gait problem  Skin: Positive for color change and rash                 Historical Information   Past Medical History:   Diagnosis Date    Arthritis     Cancer ([address] )     basal cell back, eyebrow and scalp    GERD (gastroesophageal reflux disease)     Hyperlipidemia     Hypertension     Murmur, cardiac     sept 2017 echocardiogram, slighty abnormal F/U in Jan 2018    PONV (postoperative nausea and vomiting)      Past Surgical History:   Procedure Laterality Date    BREAST SURGERY Bilateral     cyst    COLONOSCOPY  2013    x1    DILATION AND CURETTAGE OF UTERUS      HYSTERECTOMY RACHEL    WY XCAPSL CTRC RMVL INSJ IO LENS PROSTH W/O ECP Left 10/2/2017    Procedure: EXTRACTION EXTRACAPSULAR CATARACT PHACO INTRAOCULAR LENS (IOL); Surgeon: Titi Lewis MD;  Location: [facility] MAIN OR;  Service: Ophthalmology    W[place] XCAPSL CTRC RMVL INSJ IO LENS PROSTH W/O ECP Right 10/23/2017    Procedure: EXTRACTION EXTRACAPSULAR CATARACT PHACO INTRAOCULAR LENS (IOL); Surgeon: Titi Lewis MD;  Location: [facility] MAIN OR;  Service: Ophthalmology    SKIN BIOPSY      scalp, eyebrow left and midback basal cell     Social History   Social History     Substance and Sexual Activity   Alcohol Use Yes    Comment: socially     Social History     Substance and Sexual Activity   Drug Use No     Social History     Tobacco Use   Smoking Status Former Smoker    Packs/day: 0 50    Years: 25 00    Pack years: 12 50    Last attempt to quit: Ginger Cain Years since quittin 2   Smokeless Tobacco Never Used     Family History:   Family History   Problem Relation Age of Onset    Heart disease Mother         quintuple bypass    Hip dysplasia Mother     Cancer Father     Stroke Father     Heart disease Sister         valve issues    Thyroid disease Daughter     Diabetes Son     Heart disease Son     Kidney disease Son         stones    Thyroid disease Son        Meds/Allergies   all medications and allergies reviewed  No Known Allergies    Objective:      Resp 16   Ht 5' 2" (1 575 m)   Wt 63 5 kg (140 lb)   BMI 25 61 kg/m²          Physical Exam   Constitutional: She is oriented to person, place, and time  She appears well-developed and well-nourished  She is active  Non-toxic appearance  She does not have a sickly appearance  She does not appear ill  No distress  HENT:   Head: Normocephalic  Cardiovascular:   Pulses:       Dorsalis pedis pulses are 1+ on the right side, and 1+ on the left side  Posterior tibial pulses are 0 on the right side, and 0 on the left side     Palpable DP pulse, nonpalpable PT pulse, CFT is less than 3 seconds, temperature gradient within normal limit, a trophic skin changes noted with skin thinning in shiny, patient report occasional claudication to bilateral calf, localized edema foot and ankle Q9   Pulmonary/Chest: Effort normal    Musculoskeletal: She exhibits edema, tenderness and deformity  Pes planus type foot pain on palpation and range of motion of bilateral ankle, metatarsal heads bilateral foot, pain on palpation on range of motion of the ST joint, crepitus noted in midfoot joint  Fat pad atrophy noted bilateral foot   Neurological: She is alert and oriented to person, place, and time  She displays atrophy and abnormal reflex  A sensory deficit is present  Skin: Skin is dry  Capillary refill takes 2 to 3 seconds  She is not diaphoretic  Trophic skin changes bilateral foot and ankle, alternating hypopigmentation and hyperpigmentation patches around the foot and ankle on anterior leg, skin is shiny and thin, absent hair growth, atrophy of fat pad  Multiple callus formation plantar foot painful on palpation to plantar heel bilateral, skin lines absent,     Thickened dystrophic discolored toenails of right 2nd toenail subungual debris and painful upon palpation,   Psychiatric: She has a normal mood and affect      Foot Exam    Right Foot/Ankle     Neurovascular  Dorsalis pedis: 1+  Posterior tibial: 0      Left Foot/Ankle      Neurovascular  Dorsalis pedis: 1+  Posterior tibial: 0

## 2020-05-28 ENCOUNTER — OFFICE VISIT (OUTPATIENT)
Dept: PODIATRY | Facility: CLINIC | Age: 75
End: 2020-05-28
Payer: MEDICARE

## 2020-05-28 VITALS — HEART RATE: 76 BPM | WEIGHT: 140 LBS | RESPIRATION RATE: 17 BRPM | BODY MASS INDEX: 25.76 KG/M2 | HEIGHT: 62 IN

## 2020-05-28 DIAGNOSIS — M25.572 ARTHRALGIA OF BOTH FEET: ICD-10-CM

## 2020-05-28 DIAGNOSIS — B35.1 ONYCHOMYCOSIS: ICD-10-CM

## 2020-05-28 DIAGNOSIS — M77.42 METATARSALGIA OF BOTH FEET: ICD-10-CM

## 2020-05-28 DIAGNOSIS — M25.571 ARTHRALGIA OF BOTH FEET: ICD-10-CM

## 2020-05-28 DIAGNOSIS — I83.813 VARICOSE VEINS OF BILATERAL LOWER EXTREMITIES WITH PAIN: ICD-10-CM

## 2020-05-28 DIAGNOSIS — M77.41 METATARSALGIA OF BOTH FEET: ICD-10-CM

## 2020-05-28 DIAGNOSIS — M79.671 PAIN IN BOTH FEET: Primary | ICD-10-CM

## 2020-05-28 DIAGNOSIS — M79.672 PAIN IN BOTH FEET: Primary | ICD-10-CM

## 2020-05-28 PROCEDURE — 99212 OFFICE O/P EST SF 10 MIN: CPT | Performed by: PODIATRIST

## 2020-07-01 ENCOUNTER — PROCEDURE VISIT (OUTPATIENT)
Dept: FAMILY MEDICINE CLINIC | Facility: CLINIC | Age: 75
End: 2020-07-01
Payer: MEDICARE

## 2020-07-01 VITALS
WEIGHT: 139.4 LBS | DIASTOLIC BLOOD PRESSURE: 60 MMHG | TEMPERATURE: 98 F | HEART RATE: 52 BPM | HEIGHT: 63 IN | BODY MASS INDEX: 24.7 KG/M2 | OXYGEN SATURATION: 98 % | SYSTOLIC BLOOD PRESSURE: 102 MMHG | RESPIRATION RATE: 18 BRPM

## 2020-07-01 DIAGNOSIS — E78.2 MIXED HYPERLIPIDEMIA: ICD-10-CM

## 2020-07-01 DIAGNOSIS — L57.0 ACTINIC KERATOSIS: ICD-10-CM

## 2020-07-01 DIAGNOSIS — L21.9 SEBORRHEIC DERMATITIS OF SCALP: ICD-10-CM

## 2020-07-01 DIAGNOSIS — D48.5 NEOPLASM OF UNCERTAIN BEHAVIOR OF SKIN: ICD-10-CM

## 2020-07-01 DIAGNOSIS — I10 ESSENTIAL HYPERTENSION: Primary | ICD-10-CM

## 2020-07-01 DIAGNOSIS — D49.2 NEOPLASM OF SKIN: ICD-10-CM

## 2020-07-01 DIAGNOSIS — L65.9 ALOPECIA: ICD-10-CM

## 2020-07-01 PROCEDURE — 3078F DIAST BP <80 MM HG: CPT | Performed by: FAMILY MEDICINE

## 2020-07-01 PROCEDURE — 17003 DESTRUCT PREMALG LES 2-14: CPT | Performed by: FAMILY MEDICINE

## 2020-07-01 PROCEDURE — 99213 OFFICE O/P EST LOW 20 MIN: CPT | Performed by: FAMILY MEDICINE

## 2020-07-01 PROCEDURE — 3074F SYST BP LT 130 MM HG: CPT | Performed by: FAMILY MEDICINE

## 2020-07-01 PROCEDURE — 1160F RVW MEDS BY RX/DR IN RCRD: CPT | Performed by: FAMILY MEDICINE

## 2020-07-01 PROCEDURE — 88305 TISSUE EXAM BY PATHOLOGIST: CPT | Performed by: PATHOLOGY

## 2020-07-01 PROCEDURE — 17000 DESTRUCT PREMALG LESION: CPT | Performed by: FAMILY MEDICINE

## 2020-07-01 PROCEDURE — 1036F TOBACCO NON-USER: CPT | Performed by: FAMILY MEDICINE

## 2020-07-01 RX ORDER — SPIRONOLACTONE 50 MG/1
50 TABLET, FILM COATED ORAL DAILY
Qty: 90 TABLET | Refills: 1 | Status: SHIPPED | OUTPATIENT
Start: 2020-07-01 | End: 2021-05-04

## 2020-07-01 RX ORDER — KETOCONAZOLE 20 MG/ML
1 SHAMPOO TOPICAL DAILY
Qty: 120 ML | Refills: 3 | Status: SHIPPED | OUTPATIENT
Start: 2020-07-01 | End: 2022-01-04 | Stop reason: SDUPTHER

## 2020-07-01 NOTE — PROGRESS NOTES
Assessment/Plan:    Shave biopsy done in office of the right brachial lateral arm was sent for biopsy  Continue fluocinolone oil for scalp daily continue kidney stone clonus shampoo continue spironolactone 50 mg daily  Will resume back different gel twice a day for face  Will suggest for niacinamide topical   AK treated in office  Will see her back in 6 weeks  Problem List Items Addressed This Visit        Cardiovascular and Mediastinum    Hypertension - Primary    Relevant Medications    spironolactone (ALDACTONE) 50 mg tablet       Musculoskeletal and Integument    Neoplasm of skin    Relevant Medications    ketoconazole (NIZORAL) 2 % shampoo    Other Relevant Orders    Biopsy    Alopecia    Relevant Medications    spironolactone (ALDACTONE) 50 mg tablet    ketoconazole (NIZORAL) 2 % shampoo    Seborrheic dermatitis of scalp    Relevant Medications    ketoconazole (NIZORAL) 2 % shampoo       Other    Hyperlipidemia      Other Visit Diagnoses     Neoplasm of uncertain behavior of skin        Relevant Medications    ketoconazole (NIZORAL) 2 % shampoo    Other Relevant Orders    Tissue Exam            Subjective:      Patient ID: Chuck Wells is a 66 y o  female  70-year-old female follow-up seborrheic dermatitis of her scalp with alopecia and nodular a left ptosis of her face also milia close inclusion with sebaceous hyperplasia  She also noticed a lesion on her right upper arm is been a for 5 weeks  She had biopsy-proven atypical lesion on the same arm that had to get larger excision biopsy couple months ago  She is tolerating spironolactone well for her face she does noticed tremendous improvement in her acne and oily production  She has a cardiologist she follow and also with PCP that she follows        The following portions of the patient's history were reviewed and updated as appropriate:   She has a past medical history of Alopecia (7/1/2020), Arthritis, Cancer ([facility] [address] ), GERD (gastroesophageal reflux disease), Hyperlipidemia, Hypertension, Murmur, cardiac, Neoplasm of skin (7/1/2020), PONV (postoperative nausea and vomiting), and Seborrheic dermatitis of scalp (7/1/2020)  ,  does not have any pertinent problems on file  ,   has a past surgical history that includes Hysterectomy; Dilation and curettage of uterus; Colonoscopy (2013); Breast surgery (Bilateral); Skin biopsy; pr xcapsl ctrc rmvl insj io lens prosth w/o ecp (Left, 10/2/2017); pr xcapsl ctrc rmvl insj io lens prosth w/o ecp (Right, 10/23/2017); and Excision basal cell carcinoma  ,  family history includes Cancer in her father; Diabetes in her son; Heart disease in her mother, sister, and son; Hip dysplasia in her mother; Hodgkin's lymphoma in her father; Kidney disease in her son; Stroke in her father; Thyroid disease in her daughter and son ,   reports that she quit smoking about 31 years ago  She has a 12 50 pack-year smoking history  She has never used smokeless tobacco  She reports that she drinks alcohol  She reports that she does not use drugs  ,  has No Known Allergies     Current Outpatient Medications   Medication Sig Dispense Refill    aspirin (ECOTRIN LOW STRENGTH) 81 mg EC tablet Take 81 mg by mouth every evening       calcium carbonate (OS-DENIS) 600 MG tablet Take 600 mg by mouth daily       doxycycline (PERIOSTAT) 20 MG tablet Take 20 mg by mouth daily      ezetimibe-simvastatin (VYTORIN) 10-20 mg per tablet Take 1 tablet by mouth daily at bedtime      famotidine (PEPCID) 20 mg tablet Take 20 mg by mouth 2 (two) times a day      hydrochlorothiazide (HYDRODIURIL) 12 5 mg tablet Take 12 5 mg by mouth every morning      lisinopril (ZESTRIL) 5 mg tablet Take 5 mg by mouth daily      Lysine 500 MG CAPS Take by mouth daily with breakfast      Multiple Vitamins-Minerals (CENTRUM SILVER 50+WOMEN PO) Take by mouth daily with breakfast      naproxen sodium (ALEVE) 220 MG tablet Take 220 mg by mouth daily with breakfast      cholecalciferol (VITAMIN D3) 1,000 units tablet Take 1,000 Units by mouth daily      ketoconazole (NIZORAL) 2 % shampoo Apply 1 application topically daily for 30 doses 120 mL 3    spironolactone (ALDACTONE) 50 mg tablet Take 1 tablet (50 mg total) by mouth daily 90 tablet 1    vitamin E, tocopherol, 400 units capsule Take 400 Units by mouth daily       No current facility-administered medications for this visit  Review of Systems   Skin: Positive for color change  Objective:  Vitals:    07/01/20 1210   BP: 102/60   BP Location: Right arm   Patient Position: Sitting   Cuff Size: Standard   Pulse: (!) 52   Resp: 18   Temp: 98 °F (36 7 °C)   TempSrc: Tympanic   SpO2: 98%   Weight: 63 2 kg (139 lb 6 4 oz)   Height: 5' 2 5" (1 588 m)     Body mass index is 25 09 kg/m²  Physical Exam   Constitutional: She appears well-developed and well-nourished  Skin:   Scalp raise scaly lesions with dry skin and patchy thinning of the hair  Face skin is very oily production with closed comedones and milia around parent paranasal fold and her forehead  Right lateral brachial arm 3 x 6 mm raised scaly skin lesion with erythematous surrounding resembles squamous cell versus attack keratosis   Nursing note and vitals reviewed  Liquid nitrogen was applied for 3-5 seconds to the skin lesions=5 AK on scalp/face and the expected blistering or scabbing reaction explained  Do not pick at the areas  Patient reminded to expect hypopigmented scars from the procedure  Return if lesions fail to fully resolve  Shave biopsy= 3 cc lidocaine with epi used for anesthesia locally, shave biopsy done using clean technique after clean w betadine and alcohol swab  dermablade used for removal   drysol used for stop bleeding  Bacitracin used for topical wound care w bandage  Wound care advised  Shave Biopsy Procedure Note    Post-operative Diagnosis: same    Plan:  1   Instructed to keep the wound dry and covered for 24-48h and clean thereafter  2  Warning signs of infection were reviewed  3  Recommended that the patient use OTC acetaminophen as needed for pain  4  Return in 6 weeks  Pre-operative Diagnosis: neoplasm of uncertain behavior    Locations:lateral brachial    Indications: suspicious skin cancer      Anesthesia: Lidocaine 1% with epinephrine without added sodium bicarbonate    Procedure Details   History of allergy to iodine: no    Patient informed of the risks (including bleeding and infection) and benefits of the   procedure and Verbal informed consent obtained  The lesion and surrounding area were given a sterile prep using betadyne and draped in the usual sterile fashion  A scalpel was used to shave an area of skin approximately 1cm by 1cm  Hemostasis achieved with aluminum chloride  Antibiotic ointment and a sterile dressing applied  The specimen was sent for pathologic examination  The patient tolerated the procedure well  EBL: 0 ml    Findings:  Skin lesion suspicious for skin cancer    Condition:  Stable    Complications:  none          Shave lesion  Date/Time: 7/8/2020 9:32 AM  Performed by: Valorie Trinidad MD  Authorized by: Valorie Trinidad MD     Number of Lesions: 1  Lesion 1:     Body area: upper extremity    Upper extremity location: R upper arm    Final defect size (mm): 10    Destruction method: shave removal No

## 2020-07-08 PROCEDURE — 11301 SHAVE SKIN LESION 0.6-1.0 CM: CPT | Performed by: FAMILY MEDICINE

## 2020-08-06 ENCOUNTER — OFFICE VISIT (OUTPATIENT)
Dept: PODIATRY | Facility: CLINIC | Age: 75
End: 2020-08-06
Payer: MEDICARE

## 2020-08-06 VITALS
BODY MASS INDEX: 24.63 KG/M2 | RESPIRATION RATE: 17 BRPM | SYSTOLIC BLOOD PRESSURE: 102 MMHG | DIASTOLIC BLOOD PRESSURE: 60 MMHG | WEIGHT: 139 LBS | HEIGHT: 63 IN | HEART RATE: 62 BPM

## 2020-08-06 DIAGNOSIS — M25.572 ARTHRALGIA OF BOTH FEET: ICD-10-CM

## 2020-08-06 DIAGNOSIS — I83.813 VARICOSE VEINS OF BILATERAL LOWER EXTREMITIES WITH PAIN: ICD-10-CM

## 2020-08-06 DIAGNOSIS — M77.42 METATARSALGIA OF BOTH FEET: ICD-10-CM

## 2020-08-06 DIAGNOSIS — M79.671 PAIN IN BOTH FEET: Primary | ICD-10-CM

## 2020-08-06 DIAGNOSIS — M79.672 PAIN IN BOTH FEET: Primary | ICD-10-CM

## 2020-08-06 DIAGNOSIS — B35.1 ONYCHOMYCOSIS: ICD-10-CM

## 2020-08-06 DIAGNOSIS — M77.41 METATARSALGIA OF BOTH FEET: ICD-10-CM

## 2020-08-06 DIAGNOSIS — M25.571 ARTHRALGIA OF BOTH FEET: ICD-10-CM

## 2020-08-06 PROCEDURE — 99212 OFFICE O/P EST SF 10 MIN: CPT | Performed by: PODIATRIST

## 2020-08-12 ENCOUNTER — OFFICE VISIT (OUTPATIENT)
Dept: FAMILY MEDICINE CLINIC | Facility: CLINIC | Age: 75
End: 2020-08-12
Payer: MEDICARE

## 2020-08-12 VITALS
WEIGHT: 137.4 LBS | BODY MASS INDEX: 24.34 KG/M2 | TEMPERATURE: 97.7 F | HEART RATE: 59 BPM | RESPIRATION RATE: 16 BRPM | SYSTOLIC BLOOD PRESSURE: 106 MMHG | DIASTOLIC BLOOD PRESSURE: 64 MMHG | OXYGEN SATURATION: 97 % | HEIGHT: 63 IN

## 2020-08-12 DIAGNOSIS — L21.9 SEBORRHEIC DERMATITIS OF SCALP: Primary | ICD-10-CM

## 2020-08-12 DIAGNOSIS — L57.0 ACTINIC KERATOSIS: ICD-10-CM

## 2020-08-12 DIAGNOSIS — C44.622 SQUAMOUS CELL CANCER OF SKIN OF UPPER ARM, RIGHT: ICD-10-CM

## 2020-08-12 PROCEDURE — 3078F DIAST BP <80 MM HG: CPT | Performed by: FAMILY MEDICINE

## 2020-08-12 PROCEDURE — 17000 DESTRUCT PREMALG LESION: CPT | Performed by: FAMILY MEDICINE

## 2020-08-12 PROCEDURE — 3008F BODY MASS INDEX DOCD: CPT | Performed by: FAMILY MEDICINE

## 2020-08-12 PROCEDURE — 3074F SYST BP LT 130 MM HG: CPT | Performed by: FAMILY MEDICINE

## 2020-08-12 PROCEDURE — 99213 OFFICE O/P EST LOW 20 MIN: CPT | Performed by: FAMILY MEDICINE

## 2020-08-12 PROCEDURE — 17003 DESTRUCT PREMALG LES 2-14: CPT | Performed by: FAMILY MEDICINE

## 2020-08-12 PROCEDURE — 1160F RVW MEDS BY RX/DR IN RCRD: CPT | Performed by: FAMILY MEDICINE

## 2020-08-12 PROCEDURE — 1036F TOBACCO NON-USER: CPT | Performed by: FAMILY MEDICINE

## 2020-08-12 NOTE — PROGRESS NOTES
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, right            Subjective:      Patient ID: hCacho Hogan is a 66 y o  female  25-year-old female for skin check  Last visit she had a biopsy-proven squeeze squamous cell skin cancer on her right brachial arm lesion came on only about couple weeks  Biopsy showed margins clear of malignancy since then she did not know she has was put vitamin-D oil on it so the wound is scabbed dry not healing but no drainage  Very itchy and irritated but no fever no chills no warmth no redness  She has patient's cells hyperplasia on her face she has been using adapalene survey and that seemed to help she has 2 persistent spot on her face and her chest she has several actinic keratosis on her scalp that very scaly lesion  The following portions of the patient's history were reviewed and updated as appropriate:   She has a past medical history of Actinic keratosis (7/1/2020), Alopecia (7/1/2020), Arthritis, Cancer (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, right (8/12/2020)  ,  does not have any pertinent problems on file  ,   has a past surgical history that includes Hysterectomy; Dilation and curettage of uterus; Colonoscopy (2013); Breast surgery (Bilateral); Skin biopsy; pr xcapsl ctrc rmvl insj io lens prosth w/o ecp (Left, 10/2/2017); pr xcapsl ctrc rmvl insj io lens prosth w/o ecp (Right, 10/23/2017); and Excision basal cell carcinoma  ,  family history includes Cancer in her father; Diabetes in her son; Heart disease in her mother, sister, and son; Hip dysplasia in her mother; Hodgkin's lymphoma in her father; Kidney disease in her son; Stroke in her father; Thyroid disease in her daughter and son ,   reports that she quit smoking about 31 years ago  She has a 12 50 pack-year smoking history  She has never used smokeless tobacco  She reports current alcohol use  She reports that she does not use drugs  ,  has No Known Allergies     Current Outpatient Medications   Medication Sig Dispense Refill    aspirin (ECOTRIN LOW STRENGTH) 81 mg EC tablet Take 81 mg by mouth every evening       calcium carbonate (OS-DENIS) 600 MG tablet Take 600 mg by mouth daily       cholecalciferol (VITAMIN D3) 1,000 units tablet Take 1,000 Units by mouth daily      doxycycline (PERIOSTAT) 20 MG tablet Take 20 mg by mouth daily      ezetimibe-simvastatin (VYTORIN) 10-20 mg per tablet Take 1 tablet by mouth daily at bedtime      famotidine (PEPCID) 20 mg tablet Take 20 mg by mouth 2 (two) times a day      hydrochlorothiazide (HYDRODIURIL) 12 5 mg tablet Take 12 5 mg by mouth every morning      ketoconazole (NIZORAL) 2 % shampoo Apply 1 application topically daily for 30 doses 120 mL 3    lisinopril (ZESTRIL) 5 mg tablet Take 5 mg by mouth daily      Lysine 500 MG CAPS Take by mouth daily with breakfast      Multiple Vitamins-Minerals (CENTRUM SILVER 50+WOMEN PO) Take by mouth daily with breakfast      naproxen sodium (ALEVE) 220 MG tablet Take 220 mg by mouth daily with breakfast      spironolactone (ALDACTONE) 50 mg tablet Take 1 tablet (50 mg total) by mouth daily 90 tablet 1    vitamin E, tocopherol, 400 units capsule Take 400 Units by mouth daily       No current facility-administered medications for this visit  Review of Systems   Skin: Positive for rash and wound  Objective:  Vitals:    08/12/20 1039   BP: 106/64   BP Location: Left arm   Patient Position: Sitting   Cuff Size: Standard   Pulse: 59   Resp: 16   Temp: 97 7 °F (36 5 °C)   TempSrc: Temporal   SpO2: 97%   Weight: 62 3 kg (137 lb 6 4 oz)   Height: 5' 2 5" (1 588 m)     Body mass index is 24 73 kg/m²       Physical Exam  Skin:     Comments: Scattered actinic keratosis on her scalp face neck right posterior ear chest right arm left arm total of 8

## 2020-08-12 NOTE — PROGRESS NOTES
Lesion Destruction    Date/Time: 8/12/2020 6:52 PM  Performed by: Thais Palmer MD  Authorized by: Thais Palmer MD     Procedure Details - Lesion Destruction:     Number of Lesions:  6    7 through 14    Lesion 1:     Body area:  Head/neck    Head/neck location:  Scalp    Malignancy: pre-malignant lesion      Destruction method: cryotherapy    Lesion 2:     Body area:  Head/neck    Head/neck location:  Scalp    Malignancy: pre-malignant lesion      Destruction method: cryotherapy    Lesion 3:     Body area:  Head/neck    Head/neck location:  Scalp    Malignancy: pre-malignant lesion      Destruction method: cryotherapy    Lesion 4:     Body area:  Head/neck    Head/neck location:  R cheek    Malignancy: pre-malignant lesion      Destruction method: cryotherapy    Lesion 5:     Body area:  Head/neck    Head/neck location:  R ear    Malignancy: pre-malignant lesion      Destruction method: cryotherapy    Lesion 6:     Body area:  Trunk    Trunk location:  Chest    Malignancy: pre-malignant lesion      Destruction method: cryotherapy       Lesion 7 Body Area: Right arm  Initial Size (mm): 5  Final Defect size (mm): 5  Malignancy type: premalignant  Skin cancer type: Actinic keratosis  Destruction Method: [place]ruca freeze  Cosmetic: No     Lesion 8=body area= L arm  Initial size 5 mm  Final defect size= 5mm  Malignancy type= premalignant  Skin cancer type= Actinic keratosis  Destruction Method= Verruca freeze  Cosmetic=no

## 2020-09-07 NOTE — PROGRESS NOTES
Assessment/Plan:  Patient evaluated treatment options discussed with the patient,  patient educated on proper shoe gear, and the use orthotics to accommodate for patient pain and deformity, aseptic debridement toenails right foot, patient educated on oral and topical anti fungal medication, patient to continue OTC pain medication, patient states she is using naproxen for arthralgia  Patient educated on proper shoe gear and use of question soles to accommodate for fat pad atrophy and hyperkeratosis       Diagnoses and all orders for this visit:    Pain in both feet    Onychomycosis    Arthralgia of both feet    Metatarsalgia of both feet    Varicose veins of bilateral lower extremities with pain          Subjective:      Patient ID: Beni Christopher is a 66 y o  female  67-year-old female past medical history of atherosclerosis,  arthritis presents to the office pain to the bilateral foot, also pain thickened discolored toenail on the right she was unable to self manage due to the history of arthritis patient is using topical CNU anti fungal solution and report mild improvement in nail condition      The following portions of the patient's history were reviewed and updated as appropriate: allergies, current medications, past family history, past medical history, past social history, past surgical history and problem list     Review of Systems   Constitutional: Negative  Respiratory: Negative  Cardiovascular: Negative  Musculoskeletal: Positive for arthralgias and gait problem  Skin: Positive for color change and rash                 Historical Information   Past Medical History:   Diagnosis Date    Actinic keratosis 7/1/2020    Alopecia 7/1/2020    Arthritis     Cancer ([facility] [address] )     basal cell back, eyebrow and scalp    GERD (gastroesophageal reflux disease)     Hyperlipidemia     Hypertension     Murmur, cardiac     sept 2017 echocardiogram, slighty abnormal F/U in Jan 2018    Neoplasm of skin 2020    R lateral brachial arm 1 2 x 1 4 cm    PONV (postoperative nausea and vomiting)     Seborrheic dermatitis of scalp 2020    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, right 2020    Right lateral brachial      Past Surgical History:   Procedure Laterality Date    BASAL CELL CARCINOMA EXCISION      BREAST SURGERY Bilateral     cyst    COLONOSCOPY  2013    x1    DILATION AND CURETTAGE OF UTERUS      HYSTERECTOMY      RACHEL    CO XCAPSL CTRC RMVL INSJ IO LENS PROSTH W/O ECP Left 10/2/2017    Procedure: EXTRACTION EXTRACAPSULAR CATARACT PHACO INTRAOCULAR LENS (IOL); Surgeon: Christen Simpson MD;  Location: [facility] MAIN OR;  Service: Ophthalmology    CO XCAPSL CTRC RMVL INSJ IO LENS PROSTH W/O ECP Right 10/23/2017    Procedure: EXTRACTION EXTRACAPSULAR CATARACT PHACO INTRAOCULAR LENS (IOL);   Surgeon: Christen Simpson MD;  Location: [facility] MAIN OR;  Service: Ophthalmology    SKIN BIOPSY      scalp, eyebrow left and midback basal cell     Social History   Social History     Substance and Sexual Activity   Alcohol Use Yes    Comment: socially     Social History     Substance and Sexual Activity   Drug Use No     Social History     Tobacco Use   Smoking Status Former Smoker    Packs/day: 0 50    Years: 25 00    Pack years: 12 50    Last attempt to quit: Jesus Moran Years since quittin 7   Smokeless Tobacco Never Used   Tobacco Comment    Never smoker - As per [place]      Family History:   Family History   Problem Relation Age of Onset    Heart disease Mother         quintuple bypass    Hip dysplasia Mother     Cancer Father         non-Hodgkin's lymphoma     Stroke Father     Hodgkin's lymphoma Father     Heart disease Sister         valve issues    Thyroid disease Daughter     Diabetes Son     Heart disease Son     Kidney disease Son         stones    Thyroid disease Son        Meds/Allergies   all medications and allergies reviewed  No Known Allergies    Objective:      /60 Pulse 62   Resp 17   Ht 5' 2 5" (1 588 m)   Wt 63 kg (139 lb)   BMI 25 02 kg/m²          Physical Exam  Constitutional:       General: She is not in acute distress  Appearance: She is well-developed  She is not ill-appearing, toxic-appearing or diaphoretic  HENT:      Head: Normocephalic  Cardiovascular:      Pulses:           Dorsalis pedis pulses are 1+ on the right side and 1+ on the left side  Posterior tibial pulses are 0 on the right side and 0 on the left side  Comments: Palpable DP pulse, nonpalpable PT pulse, CFT is less than 3 seconds, temperature gradient within normal limit, a trophic skin changes noted with skin thinning in shiny, patient report occasional claudication to bilateral calf, localized edema foot and ankle Q9  Pulmonary:      Effort: Pulmonary effort is normal    Musculoskeletal:         General: Tenderness and deformity present  Comments: Pes planus type foot pain on palpation and range of motion of bilateral ankle, metatarsal heads bilateral foot, pain on palpation on range of motion of the ST joint, crepitus noted in midfoot joint  Fat pad atrophy noted bilateral foot   Skin:     General: Skin is dry  Capillary Refill: Capillary refill takes 2 to 3 seconds  Comments: Trophic skin changes bilateral foot and ankle, alternating hypopigmentation and hyperpigmentation patches around the foot and ankle on anterior leg, skin is shiny and thin, absent hair growth, atrophy of fat pad  Multiple callus formation plantar foot painful on palpation to plantar heel bilateral, skin lines absent,     Thickened dystrophic discolored toenails of right 2nd toenail subungual debris and painful upon palpation,   Neurological:      Mental Status: She is alert and oriented to person, place, and time  Sensory: Sensory deficit present  Motor: Atrophy present        Deep Tendon Reflexes: Reflexes abnormal       Foot Exam    Right Foot/Ankle Neurovascular  Dorsalis pedis: 1+  Posterior tibial: 0      Left Foot/Ankle      Neurovascular  Dorsalis pedis: 1+  Posterior tibial: 0

## 2020-10-14 ENCOUNTER — OFFICE VISIT (OUTPATIENT)
Dept: FAMILY MEDICINE CLINIC | Facility: CLINIC | Age: 75
End: 2020-10-14
Payer: MEDICARE

## 2020-10-14 VITALS
BODY MASS INDEX: 23.81 KG/M2 | SYSTOLIC BLOOD PRESSURE: 120 MMHG | WEIGHT: 134.4 LBS | HEART RATE: 51 BPM | TEMPERATURE: 97.2 F | HEIGHT: 63 IN | OXYGEN SATURATION: 98 % | DIASTOLIC BLOOD PRESSURE: 74 MMHG | RESPIRATION RATE: 16 BRPM

## 2020-10-14 DIAGNOSIS — L72.0 MILIA: ICD-10-CM

## 2020-10-14 DIAGNOSIS — L73.8 SEBACEOUS GLAND HYPERPLASIA OF FACE: ICD-10-CM

## 2020-10-14 DIAGNOSIS — L57.0 ACTINIC KERATOSIS: ICD-10-CM

## 2020-10-14 DIAGNOSIS — L21.9 SEBORRHEIC DERMATITIS OF SCALP: Primary | ICD-10-CM

## 2020-10-14 PROCEDURE — 99213 OFFICE O/P EST LOW 20 MIN: CPT | Performed by: FAMILY MEDICINE

## 2020-10-14 PROCEDURE — 17003 DESTRUCT PREMALG LES 2-14: CPT | Performed by: FAMILY MEDICINE

## 2020-10-14 PROCEDURE — 17000 DESTRUCT PREMALG LESION: CPT | Performed by: FAMILY MEDICINE

## 2021-02-23 ENCOUNTER — IMMUNIZATIONS (OUTPATIENT)
Dept: FAMILY MEDICINE CLINIC | Facility: HOSPITAL | Age: 76
End: 2021-02-23

## 2021-02-23 DIAGNOSIS — Z23 ENCOUNTER FOR IMMUNIZATION: Primary | ICD-10-CM

## 2021-02-23 PROCEDURE — 91300 SARS-COV-2 / COVID-19 MRNA VACCINE (PFIZER-BIONTECH) 30 MCG: CPT

## 2021-02-23 PROCEDURE — 0001A SARS-COV-2 / COVID-19 MRNA VACCINE (PFIZER-BIONTECH) 30 MCG: CPT

## 2021-03-16 ENCOUNTER — IMMUNIZATIONS (OUTPATIENT)
Dept: FAMILY MEDICINE CLINIC | Facility: HOSPITAL | Age: 76
End: 2021-03-16

## 2021-03-16 DIAGNOSIS — Z23 ENCOUNTER FOR IMMUNIZATION: Primary | ICD-10-CM

## 2021-03-16 PROCEDURE — 0002A SARS-COV-2 / COVID-19 MRNA VACCINE (PFIZER-BIONTECH) 30 MCG: CPT

## 2021-03-16 PROCEDURE — 91300 SARS-COV-2 / COVID-19 MRNA VACCINE (PFIZER-BIONTECH) 30 MCG: CPT

## 2021-04-12 ENCOUNTER — OFFICE VISIT (OUTPATIENT)
Dept: FAMILY MEDICINE CLINIC | Facility: CLINIC | Age: 76
End: 2021-04-12
Payer: MEDICARE

## 2021-04-12 VITALS
TEMPERATURE: 97 F | HEIGHT: 63 IN | OXYGEN SATURATION: 97 % | DIASTOLIC BLOOD PRESSURE: 72 MMHG | HEART RATE: 53 BPM | WEIGHT: 132.4 LBS | BODY MASS INDEX: 23.46 KG/M2 | SYSTOLIC BLOOD PRESSURE: 122 MMHG

## 2021-04-12 DIAGNOSIS — R22.0 SWELLING OF BOTH LIPS: ICD-10-CM

## 2021-04-12 DIAGNOSIS — I10 ESSENTIAL HYPERTENSION: ICD-10-CM

## 2021-04-12 DIAGNOSIS — L21.9 SEBORRHEIC DERMATITIS OF SCALP: ICD-10-CM

## 2021-04-12 DIAGNOSIS — L72.0 MILIA: ICD-10-CM

## 2021-04-12 DIAGNOSIS — L73.8 SEBACEOUS GLAND HYPERPLASIA OF FACE: ICD-10-CM

## 2021-04-12 DIAGNOSIS — D49.2 NEOPLASM OF SKIN: Primary | ICD-10-CM

## 2021-04-12 DIAGNOSIS — L57.0 ACTINIC KERATOSIS: ICD-10-CM

## 2021-04-12 PROCEDURE — 17000 DESTRUCT PREMALG LESION: CPT | Performed by: FAMILY MEDICINE

## 2021-04-12 PROCEDURE — 17003 DESTRUCT PREMALG LES 2-14: CPT | Performed by: FAMILY MEDICINE

## 2021-04-12 PROCEDURE — 99214 OFFICE O/P EST MOD 30 MIN: CPT | Performed by: FAMILY MEDICINE

## 2021-04-13 NOTE — PROGRESS NOTES
Assessment/Plan:    Patient has suspicious lesion on her right chest   Will schedule for shave biopsy  She had history squamous cell cancer before  Advised to stop lisinopril since it could cause angioedema lip swelling even though she has been on long time  Need to simplify her blood pressure regimen  She is already on lisinopril ,she is on hydrochlorothiazide  Will increase spironolactone to 50 mg daily to help with her skin and also help her blood pressure and help with potassium level  Will notify her PCP regarding this change  I have spent 25 minutes with Patient  today in which greater than 50% of this time was spent in counseling/coordination of care regarding Risks and benefits of tx options  Problem List Items Addressed This Visit        Cardiovascular and Mediastinum    Hypertension       Musculoskeletal and Integument    Neoplasm of skin - Primary    Seborrheic dermatitis of scalp    Actinic keratosis    Milia       Other    Sebaceous gland hyperplasia of face    Swelling of both lips            Subjective:      Patient ID: Eileen Yee is a 66 y o  female  64-year-old female follow-up skin concern  Last visit her skin was much improved on spironolactone 50 mg daily however she was getting dizzy her blood pressure was low so we decrease spironolactone to 25 mg daily at that time she complained of dry skin and her face has more wrinkles than usual   Since then she noticed more bumps on her chin  Then her scalp feel like there are more bumps on top  She also complained of her lips swell more than usual   Feels itching and burning  No other new medication  She is on lisinopril 5 mg daily  Hydrochlorothiazide 12 5 mg daily          The following portions of the patient's history were reviewed and updated as appropriate:   Past Medical History:  She has a past medical history of Actinic keratosis (7/1/2020), Alopecia (7/1/2020), Arthritis, Cancer ([facility] [address] ), GERD (gastroesophageal reflux disease), Hyperlipidemia, Hypertension, Milia (10/14/2020), Murmur, cardiac, Neoplasm of skin (7/1/2020), PONV (postoperative nausea and vomiting), 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), Seborrheic dermatitis of scalp (7/1/2020), 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, right (8/12/2020), and Swelling of both lips (4/12/2021)  ,  _______________________________________________________________________  Medical Problems:  does not have any pertinent problems on file ,  _______________________________________________________________________  Past Surgical History:   has a past surgical history that includes Hysterectomy; Dilation and curettage of uterus; Colonoscopy (2013); Breast surgery (Bilateral); Skin biopsy; [facility], [facility]2017); [facility]Right, 10/[facility]/2017); and Excision basal cell carcinoma  ,  _______________________________________________________________________  Family History:  family history includes Cancer in her father; Diabetes in her son; Heart disease in her mother, sister, and son; Hip dysplasia in her mother; Hodgkin's lymphoma in her father; Kidney disease in her son; Stroke in her father; Thyroid disease in her daughter and son ,  _______________________________________________________________________  Social History:   reports that she quit smoking about 32 years ago  She has a 12 50 pack-year smoking history  She has never used smokeless tobacco  She reports current alcohol use  She reports that she does not use drugs  ,  _______________________________________________________________________  Allergies:  has No Known Allergies     _______________________________________________________________________  Current Outpatient Medications   Medication Sig Dispense Refill    aspirin (ECOTRIN LOW STRENGTH) 81 mg EC tablet Take 81 mg by mouth every evening       calcium carbonate (OS-DENIS) 600 MG tablet Take 600 mg by mouth daily       doxycycline (PERIOSTAT) 20 MG tablet Take 20 mg by mouth daily      ezetimibe-simvastatin (VYTORIN) 10-20 mg per tablet Take 1 tablet by mouth daily at bedtime      famotidine (PEPCID) 20 mg tablet Take 20 mg by mouth 2 (two) times a day      hydrochlorothiazide (HYDRODIURIL) 12 5 mg tablet Take 12 5 mg by mouth every morning      lisinopril (ZESTRIL) 5 mg tablet Take 5 mg by mouth daily      Multiple Vitamins-Minerals (CENTRUM SILVER 50+WOMEN PO) Take by mouth daily with breakfast      spironolactone (ALDACTONE) 50 mg tablet Take 1 tablet (50 mg total) by mouth daily 90 tablet 1    cholecalciferol (VITAMIN D3) 1,000 units tablet Take 1,000 Units by mouth daily      ketoconazole (NIZORAL) 2 % shampoo Apply 1 application topically daily for 30 doses 120 mL 3    Lysine 500 MG CAPS Take by mouth daily with breakfast      naproxen sodium (ALEVE) 220 MG tablet Take 220 mg by mouth daily with breakfast      vitamin E, tocopherol, 400 units capsule Take 400 Units by mouth daily       No current facility-administered medications for this visit       _______________________________________________________________________  Review of Systems   Constitutional: Negative for activity change, appetite change, fatigue and unexpected weight change  HENT: Negative for ear pain, sore throat, trouble swallowing and voice change  Eyes: Negative for photophobia and visual disturbance  Respiratory: Negative for cough, chest tightness, shortness of breath and wheezing  Cardiovascular: Negative for chest pain, palpitations and leg swelling  Gastrointestinal: Negative for abdominal pain, constipation, diarrhea, nausea, rectal pain and vomiting  Endocrine: Negative for cold intolerance, polydipsia and polyuria  Genitourinary: Negative for difficulty urinating, dysuria, flank pain, menstrual problem and pelvic pain  Musculoskeletal: Negative for arthralgias, joint swelling and myalgias  Skin: Negative for color change and rash  Lips swelling, dry skin, scalp lesion, bumps on chin   Allergic/Immunologic: Negative for environmental allergies and immunocompromised state  Neurological: Negative for dizziness, weakness, numbness and headaches  Hematological: Negative for adenopathy  Does not bruise/bleed easily  Psychiatric/Behavioral: Negative for decreased concentration, dysphoric mood, self-injury, sleep disturbance and suicidal ideas  The patient is not nervous/anxious  Objective:  Vitals:    04/12/21 1116   BP: 122/72   BP Location: Left arm   Patient Position: Sitting   Cuff Size: Standard   Pulse: (!) 53   Temp: (!) 97 °F (36 1 °C)   TempSrc: Temporal   SpO2: 97%   Weight: 60 1 kg (132 lb 6 4 oz)   Height: 5' 2 5" (1 588 m)     Body mass index is 23 83 kg/m²  Physical Exam  Vitals signs and nursing note reviewed  Constitutional:       Appearance: Normal appearance  She is well-developed and normal weight  HENT:      Head: Normocephalic and atraumatic  Right Ear: Tympanic membrane normal       Left Ear: Tympanic membrane normal       Mouth/Throat:      Pharynx: No oropharyngeal exudate  Eyes:      Pupils: Pupils are equal, round, and reactive to light  Neck:      Musculoskeletal: Normal range of motion and neck supple  Thyroid: No thyromegaly  Cardiovascular:      Rate and Rhythm: Normal rate and regular rhythm  Heart sounds: Normal heart sounds  No murmur  Pulmonary:      Effort: Pulmonary effort is normal  No respiratory distress  Breath sounds: Normal breath sounds  No wheezing or rales  Abdominal:      General: Bowel sounds are normal  There is no distension  Palpations: Abdomen is soft  Tenderness: There is no abdominal tenderness  There is no guarding  Genitourinary:     Vagina: Normal    Musculoskeletal: Normal range of motion  General: No tenderness  Skin:     General: Skin is warm and dry        Capillary Refill: Capillary refill takes less than 2 seconds  Findings: No rash  Comments: Right chest 3x5 mm raised telengectasia pearly lesion  3 AK on scalp  Closed comedones with milia on chin/cheeks   Neurological:      General: No focal deficit present  Mental Status: She is alert and oriented to person, place, and time  Mental status is at baseline  Psychiatric:         Mood and Affect: Mood normal          Behavior: Behavior normal          Thought Content: Thought content normal          Judgment: Judgment normal        Lesion Destruction    Date/Time: 4/12/2021 8:57 PM  Performed by: Adrianna Robb MD  Authorized by: Adrianna Robb MD   Universal Protocol:  Consent: Verbal consent obtained  Risks and benefits: risks, benefits and alternatives were discussed  Consent given by: patient  Time out: Immediately prior to procedure a "time out" was called to verify the correct patient, procedure, equipment, support staff and site/side marked as required    Timeout called at: 4/12/2021 11:30 AM   Patient understanding: patient states understanding of the procedure being performed  Patient consent: the patient's understanding of the procedure matches consent given  Procedure consent: procedure consent matches procedure scheduled  Site marked: the operative site was marked  Patient identity confirmed: verbally with patient      Procedure Details - Lesion Destruction:     Number of Lesions:  3  Lesion 1:     Body area:  Head/neck    Head/neck location:  Scalp    Initial size (mm):  3    Final defect size (mm):  3    Malignancy: pre-malignant lesion      Destruction method: cryotherapy    Lesion 2:     Body area:  Head/neck    Head/neck location:  Scalp    Initial size (mm):  4    Final defect size (mm):  4    Malignancy: pre-malignant lesion      Destruction method: cryotherapy    Lesion 3:     Body area:  [facility] location:  Scalp    Initial size (mm):  3    Final defect size (mm):  3 Malignancy: pre-malignant lesion      Destruction method: cryotherapy

## 2021-04-23 ENCOUNTER — OFFICE VISIT (OUTPATIENT)
Dept: FAMILY MEDICINE CLINIC | Facility: CLINIC | Age: 76
End: 2021-04-23
Payer: MEDICARE

## 2021-04-23 VITALS
OXYGEN SATURATION: 98 % | DIASTOLIC BLOOD PRESSURE: 80 MMHG | SYSTOLIC BLOOD PRESSURE: 112 MMHG | TEMPERATURE: 97 F | WEIGHT: 133.8 LBS | HEIGHT: 63 IN | HEART RATE: 57 BPM | BODY MASS INDEX: 23.71 KG/M2

## 2021-04-23 DIAGNOSIS — D49.2 NEOPLASM OF SKIN: Primary | ICD-10-CM

## 2021-04-23 DIAGNOSIS — L57.0 ACTINIC KERATOSIS: ICD-10-CM

## 2021-04-23 DIAGNOSIS — C80.1 CANCER (HCC): ICD-10-CM

## 2021-04-23 PROCEDURE — 99213 OFFICE O/P EST LOW 20 MIN: CPT | Performed by: FAMILY MEDICINE

## 2021-04-23 PROCEDURE — 88305 TISSUE EXAM BY PATHOLOGIST: CPT | Performed by: PATHOLOGY

## 2021-04-23 PROCEDURE — 17003 DESTRUCT PREMALG LES 2-14: CPT | Performed by: FAMILY MEDICINE

## 2021-04-23 PROCEDURE — 11301 SHAVE SKIN LESION 0.6-1.0 CM: CPT | Performed by: FAMILY MEDICINE

## 2021-04-23 PROCEDURE — 17000 DESTRUCT PREMALG LESION: CPT | Performed by: FAMILY MEDICINE

## 2021-04-23 NOTE — PROGRESS NOTES
Assessment/Plan:    Await pathology     Problem List Items Addressed This Visit        Musculoskeletal and Integument    Neoplasm of skin - Primary    Relevant Orders    Tissue Exam    Actinic keratosis       Other    Cancer (HCC)            Subjective:      Patient ID: Larissa Lopez is a 66 y o  female  65 yo F f/u for shave bx lesion of skin, h/o squamous cell ca      The following portions of the patient's history were reviewed and updated as appropriate:   Past Medical History:  She has a past medical history of Actinic keratosis (7/1/2020), Alopecia (7/1/2020), Arthritis, Cancer ([facility] [address] ), GERD (gastroesophageal reflux disease), Hyperlipidemia, Hypertension, Milia (10/14/2020), Murmur, cardiac, Neoplasm of skin (7/1/2020), PONV (postoperative nausea and vomiting), 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), Seborrheic dermatitis of scalp (7/1/2020), 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, right (8/12/2020), and Swelling of both lips (4/12/2021)  ,  _______________________________________________________________________  Medical Problems:  does not have any pertinent problems on file ,  _______________________________________________________________________  Past Surgical History:   has a past surgical history that includes Hysterectomy; Dilation and curettage of uterus; Colonoscopy (2013); Breast surgery (Bilateral); Skin biopsy; pr xcapsl [facility] insj io lens prosth w/o ecp (Left, 10/2/2017); pr xcapsl [facility] insj io lens prosth w/o ecp (Right, 10/23/2017); and Excision basal cell carcinoma  ,  _______________________________________________________________________  Family History:  family history includes Cancer in her father; Diabetes in her son; Heart disease in her mother, sister, and son; Hip dysplasia in her mother; Hodgkin's lymphoma in her father; Kidney disease in her son; Stroke in her father;  Thyroid disease in her daughter and son ,  _______________________________________________________________________  Social History:   reports that she quit smoking about 32 years ago  She has a 12 50 pack-year smoking history  She has never used smokeless tobacco  She reports current alcohol use  She reports that she does not use drugs  ,  _______________________________________________________________________  Allergies:  has No Known Allergies     _______________________________________________________________________  Current Outpatient Medications   Medication Sig Dispense Refill    aspirin (ECOTRIN LOW STRENGTH) 81 mg EC tablet Take 81 mg by mouth every evening       calcium carbonate (OS-DENIS) 600 MG tablet Take 600 mg by mouth daily       cholecalciferol (VITAMIN D3) 1,000 units tablet Take 1,000 Units by mouth daily      doxycycline (PERIOSTAT) 20 MG tablet Take 20 mg by mouth daily      ezetimibe-simvastatin (VYTORIN) 10-20 mg per tablet Take 1 tablet by mouth daily at bedtime      famotidine (PEPCID) 20 mg tablet Take 20 mg by mouth 2 (two) times a day      hydrochlorothiazide (HYDRODIURIL) 12 5 mg tablet Take 12 5 mg by mouth every morning      ketoconazole (NIZORAL) 2 % shampoo Apply 1 application topically daily for 30 doses 120 mL 3    lisinopril (ZESTRIL) 5 mg tablet Take 5 mg by mouth daily      Lysine 500 MG CAPS Take by mouth daily with breakfast      Multiple Vitamins-Minerals (CENTRUM SILVER 50+WOMEN PO) Take by mouth daily with breakfast      naproxen sodium (ALEVE) 220 MG tablet Take 220 mg by mouth daily with breakfast      spironolactone (ALDACTONE) 50 mg tablet Take 1 tablet (50 mg total) by mouth daily 90 tablet 1    vitamin E, tocopherol, 400 units capsule Take 400 Units by mouth daily       No current facility-administered medications for this visit       _______________________________________________________________________  Review of Systems   Skin: Positive for color change, pallor and wound  Objective:  Vitals:    04/23/21 1024   BP: 112/80   BP Location: Left arm   Patient Position: Sitting   Cuff Size: Standard   Pulse: 57   Temp: (!) 97 °F (36 1 °C)   TempSrc: Temporal   SpO2: 98%   Weight: 60 7 kg (133 lb 12 8 oz)   Height: 5' 2 5" (1 588 m)     Body mass index is 24 08 kg/m²  Physical Exam  Vitals signs and nursing note reviewed  Constitutional:       Appearance: Normal appearance  Skin:     Comments: 8x8 mm raised pearly lesion right chest   Neurological:      Mental Status: She is alert  Shave lesion    Date/Time: 4/23/2021 6:57 PM  Performed by: Adrianna Robb MD  Authorized by: Adrianna Robb MD   Universal Protocol:  Consent: Verbal consent obtained  Risks and benefits: risks, benefits and alternatives were discussed  Consent given by: patient  Time out: Immediately prior to procedure a "time out" was called to verify the correct patient, procedure, equipment, support staff and site/side marked as required  Timeout called at: 4/23/2021 11:39 AM   Patient understanding: patient states understanding of the procedure being performed  Patient consent: the patient's understanding of the procedure matches consent given  Procedure consent: procedure consent matches procedure scheduled  Relevant documents: relevant documents present and verified  Site marked: the operative site was marked  Patient identity confirmed: verbally with patient      Number of Lesions: 1  Lesion 1:     Body area: trunk    Trunk location: chest    Initial size (mm): 8    Final defect size (mm): 8    Malignancy: malignancy unknown      Destruction method: shave removal      Comments:  3 ml lidocaine with epi used for anesthetic  dermablade used for removal  drysol used for hemostatis  Bacitracin used for wound care  Lesion Destruction    Date/Time: 4/23/2021 7:00 PM  Performed by: Adrianna Robb MD  Authorized by: Adrianna Robb MD   Universal Protocol:  Consent: Verbal consent obtained    Risks and benefits: risks, benefits and alternatives were discussed  Consent given by: patient  Time out: Immediately prior to procedure a "time out" was called to verify the correct patient, procedure, equipment, support staff and site/side marked as required    Timeout called at: 4/23/2021 11:45 AM   Patient understanding: patient states understanding of the procedure being performed  Patient consent: the patient's understanding of the procedure matches consent given  Procedure consent: procedure consent matches procedure scheduled  Test results: test results available and properly labeled  Site marked: the operative site was marked  Patient identity confirmed: verbally with patient      Procedure Details - Lesion Destruction:     Number of Lesions:  2  Lesion 1:     Body area:  Upper extremity    Upper extremity location:  R upper arm    Initial size (mm):  3    Final defect size (mm):  3    Malignancy: pre-malignant lesion      Destruction method: cryotherapy    Lesion 2:     Body area:  Lower extremity    Lower extremity location:  R lower leg    Initial size (mm):  3    Final defect size (mm):  3    Malignancy: pre-malignant lesion      Destruction method: cryotherapy

## 2021-04-24 ENCOUNTER — NURSE TRIAGE (OUTPATIENT)
Dept: OTHER | Facility: OTHER | Age: 76
End: 2021-04-24

## 2021-04-24 NOTE — TELEPHONE ENCOUNTER
Pt c/o blister at site of lesion destruction on right leg after office procedure yesterday  Blister formed this morning and was growing in size with mild redness  Blister broke while on phone with pt  Offered to contact on call provider for office  Pt declined and will monitor symptoms at home and call back if worse  Pt advised to cover area with an antibiotic ointment  Reason for Disposition   [1] Frostbite blisters AND [2] contain clear fluid    Answer Assessment - Initial Assessment Questions  1  DESCRIPTION: "How does it feel?" "What does it look like?" "Are there any blisters"? Mild redness "When I woke up this morning it was blistered  A little while ago the blisters were higher " Blister broke while speaking with pt  Clear fluid  Denies any pus    2  LOCATION: "Where is the frostbite located?"      Right leg top of thigh "It looked like a pimple on my leg  She looked at it and didn't think it was squamous so she froze it during my appointment yesterday  Put an antibiotic cream on it and then put a band aid over it "   3  SIZE: "How large an area is involved?"      1/2in wide 1/2in long   4  ONSET: "When did it happen?"      Frozen yesterday, Blistering started today  5  HYPOTHERMIA: "Are you shivering?"      N/A   6  CAUSE: "What do you think caused the frostbite?"      Area frozen by provider yesterday during an office procedure  7  OTHER SYMPTOMS: "Do you have any other symptoms?" (e g , weakness, difficulty walking, confusion)      Denies   8   PREGNANCY: "Is there any chance you are pregnant?" "When was your last menstrual period?"      N/A    Protocols used: [facility]

## 2021-04-24 NOTE — TELEPHONE ENCOUNTER
Regarding: blister on frozen skin   ----- Message from Ervin Pitt sent at 4/24/2021 10:47 AM EDT -----  " I beleive where I got something frozen on my leg yesterday could be infected, there are blisters  "

## 2021-04-26 ENCOUNTER — TELEPHONE (OUTPATIENT)
Dept: FAMILY MEDICINE CLINIC | Facility: CLINIC | Age: 76
End: 2021-04-26

## 2021-04-26 NOTE — TELEPHONE ENCOUNTER
Please let pt know she can put bacitracin ointment on the area, blister is common since the cryotreatment is supposed to produce superficial burn on skin  Vitamin E oil continuously on the area will prevent scarring

## 2021-04-27 NOTE — TELEPHONE ENCOUNTER
Spoke with patient  She states she had blisters at the site of her lesion destruction  She states it is better now, no signs of infection, no pain  She has a follow up scheduled for 5/5/2021  Advised patient to call back if any problems arise so we can see her sooner if needed

## 2021-04-28 ENCOUNTER — OFFICE VISIT (OUTPATIENT)
Dept: FAMILY MEDICINE CLINIC | Facility: CLINIC | Age: 76
End: 2021-04-28
Payer: MEDICARE

## 2021-04-28 VITALS
OXYGEN SATURATION: 97 % | TEMPERATURE: 97 F | BODY MASS INDEX: 23.64 KG/M2 | DIASTOLIC BLOOD PRESSURE: 76 MMHG | HEART RATE: 56 BPM | WEIGHT: 133.4 LBS | HEIGHT: 63 IN | SYSTOLIC BLOOD PRESSURE: 112 MMHG

## 2021-04-28 DIAGNOSIS — D49.2 NEOPLASM OF SKIN: ICD-10-CM

## 2021-04-28 DIAGNOSIS — S70.321A BLISTER (NONTHERMAL), RIGHT THIGH, INITIAL ENCOUNTER: Primary | ICD-10-CM

## 2021-04-28 PROCEDURE — 99024 POSTOP FOLLOW-UP VISIT: CPT | Performed by: FAMILY MEDICINE

## 2021-04-28 NOTE — PROGRESS NOTES
Assessment/Plan:    Reassured patient of normal blistering secondary from cryo treatment  Advised to use Silvadene topical along with vitamin E oil keep the area cover  Will notify patient of by pathology results  Patient can decrease spironolactone to 25 mg daily to help with lips dry  I have spent 15 minutes with Patient  today in which greater than 50% of this time was spent in counseling/coordination of care regarding Risks and benefits of tx options  Problem List Items Addressed This Visit        Musculoskeletal and Integument    Neoplasm of skin    Relevant Medications    silver sulfadiazine (SILVADENE,SSD) 1 % cream    Blister (nonthermal), right thigh, initial encounter - Primary    Relevant Medications    silver sulfadiazine (SILVADENE,SSD) 1 % cream            Subjective:      Patient ID: Washington Puckett is a 66 y o  female  64-year-old female presenting complain of a blister on her wound which she had a shave biopsy done 5 days ago  She also had cryo treatment done on the right leg and since then she has been having blister  The blister off and on get better and she has been putting Neosporin on it but she has 1 is 2nd opinion to make sure that is healing well  She has been using vitamin-D oil on the lesion on her chest   Biopsy results have not come back  No fever no chills no drainage        The following portions of the patient's history were reviewed and updated as appropriate:   Past Medical History:  She has a past medical history of Actinic keratosis (7/1/2020), Alopecia (7/1/2020), Arthritis, Blister (nonthermal), right thigh, initial encounter (4/28/2021), Cancer ([address] ), GERD (gastroesophageal reflux disease), Hyperlipidemia, Hypertension, Milia (10/14/2020), Murmur, cardiac, Neoplasm of skin (7/1/2020), PONV (postoperative nausea and vomiting), 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), Seborrheic dermatitis of scalp (7/1/2020), 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, right (8/12/2020), and Swelling of both lips (4/12/2021)  ,  _______________________________________________________________________  Medical Problems:  does not have any pertinent problems on file ,  _______________________________________________________________________  Past Surgical History:   has a past surgical history that includes Hysterectomy; Dilation and curettage of uterus; Colonoscopy (2013); Breast surgery (Bilateral); Skin biopsy; pr [facility] insj io lens prosth w/o ecp (Left, 10/2/2017); [facility], [facility]/2017); and Excision basal cell carcinoma  ,  _______________________________________________________________________  Family History:  family history includes Cancer in her father; Diabetes in her son; Heart disease in her mother, sister, and son; Hip dysplasia in her mother; Hodgkin's lymphoma in her father; Kidney disease in her son; Stroke in her father; Thyroid disease in her daughter and son ,  _______________________________________________________________________  Social History:   reports that she quit smoking about 32 years ago  She has a 12 50 pack-year smoking history  She has never used smokeless tobacco  She reports current alcohol use  She reports that she does not use drugs  ,  _______________________________________________________________________  Allergies:  has No Known Allergies     _______________________________________________________________________  Current Outpatient Medications   Medication Sig Dispense Refill    aspirin (ECOTRIN LOW STRENGTH) 81 mg EC tablet Take 81 mg by mouth every evening       calcium carbonate (OS-DENIS) 600 MG tablet Take 600 mg by mouth daily       cholecalciferol (VITAMIN D3) 1,000 units tablet Take 1,000 Units by mouth daily      doxycycline (PERIOSTAT) 20 MG tablet Take 20 mg by mouth daily      ezetimibe-simvastatin (VYTORIN) 10-20 mg per tablet Take 1 tablet by mouth daily at bedtime      famotidine (PEPCID) 20 mg tablet Take 20 mg by mouth 2 (two) times a day      hydrochlorothiazide (HYDRODIURIL) 12 5 mg tablet Take 12 5 mg by mouth every morning      lisinopril (ZESTRIL) 5 mg tablet Take 5 mg by mouth daily      Lysine 500 MG CAPS Take by mouth daily with breakfast      Multiple Vitamins-Minerals (CENTRUM SILVER 50+WOMEN PO) Take by mouth daily with breakfast      naproxen sodium (ALEVE) 220 MG tablet Take 220 mg by mouth daily with breakfast      vitamin E, tocopherol, 400 units capsule Take 400 Units by mouth daily      ketoconazole (NIZORAL) 2 % shampoo Apply 1 application topically daily for 30 doses 120 mL 3    silver sulfadiazine (SILVADENE,SSD) 1 % cream Apply topically 2 (two) times a day Apply On blister, then layer with vitamin E oil 50 g 0    spironolactone (ALDACTONE) 50 mg tablet Take 1 tablet (50 mg total) by mouth daily 90 tablet 1     No current facility-administered medications for this visit       _______________________________________________________________________  Review of Systems   Constitutional: Negative for activity change, appetite change, fatigue and unexpected weight change  HENT: Negative for ear pain, sore throat, trouble swallowing and voice change  Eyes: Negative for photophobia and visual disturbance  Respiratory: Negative for cough, chest tightness, shortness of breath and wheezing  Cardiovascular: Negative for chest pain, palpitations and leg swelling  Gastrointestinal: Negative for abdominal pain, constipation, diarrhea, nausea, rectal pain and vomiting  Endocrine: Negative for cold intolerance, polydipsia and polyuria  Genitourinary: Negative for difficulty urinating, dysuria, flank pain, menstrual problem and pelvic pain  Musculoskeletal: Negative for arthralgias, joint swelling and myalgias  Skin: Positive for wound  Negative for color change and rash     Allergic/Immunologic: Negative for environmental allergies and immunocompromised state  Neurological: Negative for dizziness, weakness, numbness and headaches  Hematological: Negative for adenopathy  Does not bruise/bleed easily  Psychiatric/Behavioral: Negative for decreased concentration, dysphoric mood, self-injury, sleep disturbance and suicidal ideas  The patient is not nervous/anxious  Objective:  Vitals:    04/28/21 1041   BP: 112/76   BP Location: Left arm   Patient Position: Sitting   Cuff Size: Standard   Pulse: 56   Temp: (!) 97 °F (36 1 °C)   TempSrc: Temporal   SpO2: 97%   Weight: 60 5 kg (133 lb 6 4 oz)   Height: 5' 2 5" (1 588 m)     Body mass index is 24 01 kg/m²  Physical Exam  Vitals signs and nursing note reviewed  Constitutional:       Appearance: Normal appearance  She is normal weight  Pulmonary:      Effort: Pulmonary effort is normal    Skin:     General: Skin is warm and dry  Comments: Right thigh blister 2x1 5 cm raised, nontender, no warthm, mild erythema, no fluctuant   Neurological:      Mental Status: She is alert

## 2021-04-29 ENCOUNTER — TELEPHONE (OUTPATIENT)
Dept: FAMILY MEDICINE CLINIC | Facility: CLINIC | Age: 76
End: 2021-04-29

## 2021-04-29 NOTE — TELEPHONE ENCOUNTER
Left message for patient to call the office back in regards to her tissue exam results  Results are as follows:    Please let pt know the biopsy comes back as basal cell cancer  I removed it all!  Please have pt see me in July for skin check

## 2021-05-04 DIAGNOSIS — L65.9 ALOPECIA: ICD-10-CM

## 2021-05-04 RX ORDER — SPIRONOLACTONE 50 MG/1
TABLET, FILM COATED ORAL
Qty: 90 TABLET | Refills: 1 | Status: SHIPPED | OUTPATIENT
Start: 2021-05-04 | End: 2021-06-16

## 2021-06-14 DIAGNOSIS — L65.9 ALOPECIA: ICD-10-CM

## 2021-06-16 RX ORDER — SPIRONOLACTONE 50 MG/1
25 TABLET, FILM COATED ORAL DAILY
Qty: 90 TABLET | Refills: 1 | Status: SHIPPED | OUTPATIENT
Start: 2021-06-16

## 2021-07-16 ENCOUNTER — OFFICE VISIT (OUTPATIENT)
Dept: FAMILY MEDICINE CLINIC | Facility: CLINIC | Age: 76
End: 2021-07-16
Payer: MEDICARE

## 2021-07-16 VITALS
TEMPERATURE: 97.9 F | OXYGEN SATURATION: 98 % | BODY MASS INDEX: 23.42 KG/M2 | WEIGHT: 132.2 LBS | DIASTOLIC BLOOD PRESSURE: 72 MMHG | HEIGHT: 63 IN | HEART RATE: 56 BPM | SYSTOLIC BLOOD PRESSURE: 112 MMHG

## 2021-07-16 DIAGNOSIS — L73.8 SEBACEOUS GLAND HYPERPLASIA OF FACE: ICD-10-CM

## 2021-07-16 DIAGNOSIS — L21.9 SEBORRHEIC DERMATITIS OF SCALP: Primary | ICD-10-CM

## 2021-07-16 DIAGNOSIS — C44.712 BASAL CELL CARCINOMA (BCC) OF SKIN OF RIGHT LOWER EXTREMITY INCLUDING HIP: ICD-10-CM

## 2021-07-16 DIAGNOSIS — L57.0 ACTINIC KERATOSIS: ICD-10-CM

## 2021-07-16 PROCEDURE — 17000 DESTRUCT PREMALG LESION: CPT | Performed by: FAMILY MEDICINE

## 2021-07-16 PROCEDURE — 99214 OFFICE O/P EST MOD 30 MIN: CPT | Performed by: FAMILY MEDICINE

## 2021-07-16 PROCEDURE — 17003 DESTRUCT PREMALG LES 2-14: CPT | Performed by: FAMILY MEDICINE

## 2021-07-16 NOTE — PROGRESS NOTES
Assessment/Plan:  Continue spironolactone 25 mg daily  Until evaluation endocrinologist   Wants workup done then we can resume spironolactone 50 mg daily because sebaceous hyperplasia on her face benefit much better spironolactone 50 mg daily  Advised for skin check  Advised sun care protection  Cryo treatment done today  Will see her back in 3 months  I have spent 25 minutes with Patient  today in which greater than 50% of this time was spent in counseling/coordination of care regarding Prognosis, Risks and benefits of tx options and Intructions for management  Problem List Items Addressed This Visit        Musculoskeletal and Integument    Seborrheic dermatitis of scalp - Primary    Actinic keratosis    Basal cell carcinoma (BCC) of skin of right lower extremity including hip       Other    Sebaceous gland hyperplasia of face            Subjective:      Patient ID: Jeovany Arrington is a 66 y o  female  52-year-old female follow-up skin check  Patient present complained of dry scaly spot on her scalp again  Biopsy of the lesion on her right thigh in April showed school basal cell skin cancer  Lesion was removed completely  She also had cryo treatment to get rid of the lesion  She is on spironolactone 25 mg daily for sebaceous cell hyperplasia on her face she has a lot of closed comedones  She using Differin gel and that seemed to help  Recently she was diagnosed with high parathyroid hormone level  She has appointment endocrinologist coming up  she got a burn on her feet from go to the beach        The following portions of the patient's history were reviewed and updated as appropriate:   Past Medical History:  She has a past medical history of Actinic keratosis (7/1/2020), Alopecia (7/1/2020), Arthritis, Basal cell carcinoma (BCC) of skin of right lower extremity including hip (7/16/2021), Blister (nonthermal), right thigh, initial encounter (4/28/2021), Cancer ([facility] [address] ), GERD (gastroesophageal reflux disease), Hyperlipidemia, Hypertension, Milia (10/14/2020), Murmur, cardiac, Neoplasm of skin (7/1/2020), PONV (postoperative nausea and vomiting), 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), Seborrheic dermatitis of scalp (7/1/2020), 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, right (8/12/2020), and Swelling of both lips (4/12/2021)  ,  _______________________________________________________________________  Medical Problems:  does not have any pertinent problems on file ,  _______________________________________________________________________  Past Surgical History:   has a past surgical history that includes Hysterectomy; Dilation and curettage of uterus; Colonoscopy (2013); Breast surgery (Bilateral); Skin biopsy; [facility] (Left, 10/2/2017); pr xcapsl ctrc vl insj io lens prosth w/o ecp (Right, 10/23/2017); and Excision basal cell carcinoma  ,  _______________________________________________________________________  Family History:  family history includes Cancer in her father; Diabetes in her son; Heart disease in her mother, sister, and son; Hip dysplasia in her mother; Hodgkin's lymphoma in her father; Kidney disease in her son; Stroke in her father; Thyroid disease in her daughter and son ,  _______________________________________________________________________  Social History:   reports that she quit smoking about 32 years ago  She has a 12 50 pack-year smoking history  She has never used smokeless tobacco  She reports current alcohol use  She reports that she does not use drugs  ,  _______________________________________________________________________  Allergies:  has No Known Allergies     _______________________________________________________________________  Current Outpatient Medications   Medication Sig Dispense Refill    aspirin (ECOTRIN LOW STRENGTH) 81 mg EC tablet Take 81 mg by mouth every evening       calcium carbonate (OS-DENIS) 600 MG tablet Take 600 mg by mouth daily       doxycycline (PERIOSTAT) 20 MG tablet Take 20 mg by mouth daily      ezetimibe-simvastatin (VYTORIN) 10-20 mg per tablet Take 1 tablet by mouth daily at bedtime      famotidine (PEPCID) 20 mg tablet Take 20 mg by mouth 2 (two) times a day      hydrochlorothiazide (HYDRODIURIL) 12 5 mg tablet Take 12 5 mg by mouth every morning      ketoconazole (NIZORAL) 2 % shampoo Apply 1 application topically daily for 30 doses 120 mL 3    Multiple Vitamins-Minerals (CENTRUM SILVER 50+WOMEN PO) Take by mouth daily with breakfast      spironolactone (ALDACTONE) 50 mg tablet Take 0 5 tablets (25 mg total) by mouth daily 90 tablet 1    cholecalciferol (VITAMIN D3) 1,000 units tablet Take 1,000 Units by mouth daily (Patient not taking: Reported on 7/16/2021)      lisinopril (ZESTRIL) 5 mg tablet Take 5 mg by mouth daily (Patient not taking: Reported on 7/16/2021)      Lysine 500 MG CAPS Take by mouth daily with breakfast (Patient not taking: Reported on 7/16/2021)      naproxen sodium (ALEVE) 220 MG tablet Take 220 mg by mouth daily with breakfast (Patient not taking: Reported on 7/16/2021)      silver sulfadiazine (SILVADENE,SSD) 1 % cream Apply topically 2 (two) times a day Apply On blister, then layer with vitamin E oil (Patient not taking: Reported on 7/16/2021) 50 g 0    vitamin E, tocopherol, 400 units capsule Take 400 Units by mouth daily (Patient not taking: Reported on 7/16/2021)       No current facility-administered medications for this visit      _______________________________________________________________________  Review of Systems   Constitutional: Negative for activity change, appetite change, fatigue and unexpected weight change  HENT: Negative for ear pain, sore throat, trouble swallowing and voice change  Eyes: Negative for photophobia and visual disturbance     Respiratory: Negative for cough, chest tightness, shortness of breath and wheezing  Cardiovascular: Negative for chest pain, palpitations and leg swelling  Gastrointestinal: Negative for abdominal pain, constipation, diarrhea, nausea, rectal pain and vomiting  Endocrine: Negative for cold intolerance, polydipsia and polyuria  Genitourinary: Negative for difficulty urinating, dysuria, flank pain, menstrual problem and pelvic pain  Musculoskeletal: Negative for arthralgias, joint swelling and myalgias  Skin: Positive for color change  Negative for rash  Allergic/Immunologic: Negative for environmental allergies and immunocompromised state  Neurological: Negative for dizziness, weakness, numbness and headaches  Hematological: Negative for adenopathy  Does not bruise/bleed easily  Psychiatric/Behavioral: Negative for decreased concentration, dysphoric mood, self-injury, sleep disturbance and suicidal ideas  The patient is not nervous/anxious  Objective:  Vitals:    07/16/21 1017   BP: 112/72   BP Location: Left arm   Patient Position: Sitting   Cuff Size: Standard   Pulse: 56   Temp: 97 9 °F (36 6 °C)   TempSrc: Temporal   SpO2: 98%   Weight: 60 kg (132 lb 3 2 oz)   Height: 5' 2 5" (1 588 m)     Body mass index is 23 79 kg/m²  Physical Exam  Vitals and nursing note reviewed  Constitutional:       Appearance: Normal appearance  She is normal weight  Skin:     General: Skin is warm and dry  Findings: Lesion present  Comments: Several actinic keratosis on her scalp  Lesion on her right anterior thigh resolved completely no residual   Closed comedones on her face lift more than before  However able to extract for gentle manipulation  Neurological:      General: No focal deficit present  Mental Status: She is alert and oriented to person, place, and time  Mental status is at baseline  Psychiatric:         Mood and Affect: Mood normal          Behavior: Behavior normal          Thought Content:  Thought content normal          Judgment: Judgment normal        Lesion Destruction    Date/Time: 7/16/2021 11:14 AM  Performed by: Gi Velasquez MD  Authorized by: Gi Velasquez MD   Universal Protocol:  Consent: Verbal consent obtained  Risks and benefits: risks, benefits and alternatives were discussed  Consent given by: patient  Time out: Immediately prior to procedure a "time out" was called to verify the correct patient, procedure, equipment, support staff and site/side marked as required    Timeout called at: 7/16/2021 11:14 AM   Patient understanding: patient states understanding of the procedure being performed  Patient consent: the patient's understanding of the procedure matches consent given  Patient identity confirmed: verbally with patient      Procedure Details - Lesion Destruction:     Number of Lesions:  4  Lesion 1:     Body area:  Head/neck    Head/neck location:  Scalp    Initial size (mm):  4    Final defect size (mm):  4    Malignancy: pre-malignant lesion      Destruction method: cryotherapy    Lesion 2:     Body area:  Head/neck    Head/neck location:  Scalp    Initial size (mm):  3    Final defect size (mm):  3    Malignancy: pre-malignant lesion      Destruction method: cryotherapy    Lesion 3:     Body area:  Head/neck    Head/neck location:  Scalp    Initial size (mm):  3    Final defect size (mm):  3    Malignancy: pre-malignant lesion      Destruction method: cryotherapy    Lesion 4:     Body area:  [address] location:  Scalp    Initial size (mm):  4    Final defect size (mm):  4    Malignancy: pre-malignant lesion      Destruction method: cryotherapy

## 2021-08-05 ENCOUNTER — OFFICE VISIT (OUTPATIENT)
Dept: PODIATRY | Facility: CLINIC | Age: 76
End: 2021-08-05
Payer: MEDICARE

## 2021-08-05 VITALS
WEIGHT: 132 LBS | BODY MASS INDEX: 23.39 KG/M2 | SYSTOLIC BLOOD PRESSURE: 112 MMHG | RESPIRATION RATE: 17 BRPM | DIASTOLIC BLOOD PRESSURE: 76 MMHG | HEART RATE: 56 BPM | HEIGHT: 63 IN

## 2021-08-05 DIAGNOSIS — M79.672 LEFT FOOT PAIN: ICD-10-CM

## 2021-08-05 DIAGNOSIS — M72.2 PLANTAR FASCIITIS, LEFT: Primary | ICD-10-CM

## 2021-08-05 PROCEDURE — 99213 OFFICE O/P EST LOW 20 MIN: CPT | Performed by: PODIATRIST

## 2021-08-05 RX ORDER — NAPROXEN 500 MG/1
500 TABLET ORAL 2 TIMES DAILY WITH MEALS
Qty: 30 TABLET | Refills: 0 | Status: SHIPPED | OUTPATIENT
Start: 2021-08-05 | End: 2021-10-06

## 2021-08-19 ENCOUNTER — OFFICE VISIT (OUTPATIENT)
Dept: PODIATRY | Facility: CLINIC | Age: 76
End: 2021-08-19
Payer: MEDICARE

## 2021-08-19 VITALS
WEIGHT: 132 LBS | BODY MASS INDEX: 23.39 KG/M2 | SYSTOLIC BLOOD PRESSURE: 112 MMHG | RESPIRATION RATE: 17 BRPM | HEIGHT: 63 IN | DIASTOLIC BLOOD PRESSURE: 76 MMHG

## 2021-08-19 DIAGNOSIS — M25.572 ARTHRALGIA OF BOTH FEET: ICD-10-CM

## 2021-08-19 DIAGNOSIS — M72.2 PLANTAR FASCIITIS, LEFT: Primary | ICD-10-CM

## 2021-08-19 DIAGNOSIS — M25.571 ARTHRALGIA OF BOTH FEET: ICD-10-CM

## 2021-08-19 DIAGNOSIS — M79.672 LEFT FOOT PAIN: ICD-10-CM

## 2021-08-19 PROCEDURE — 99213 OFFICE O/P EST LOW 20 MIN: CPT | Performed by: PODIATRIST

## 2021-08-19 RX ORDER — NAPROXEN 500 MG/1
500 TABLET ORAL 2 TIMES DAILY WITH MEALS
Qty: 30 TABLET | Refills: 0 | Status: SHIPPED | OUTPATIENT
Start: 2021-08-19 | End: 2021-10-06

## 2021-10-06 ENCOUNTER — OFFICE VISIT (OUTPATIENT)
Dept: FAMILY MEDICINE CLINIC | Facility: CLINIC | Age: 76
End: 2021-10-06
Payer: MEDICARE

## 2021-10-06 VITALS
OXYGEN SATURATION: 96 % | TEMPERATURE: 97.6 F | WEIGHT: 133.4 LBS | DIASTOLIC BLOOD PRESSURE: 72 MMHG | HEIGHT: 63 IN | HEART RATE: 51 BPM | BODY MASS INDEX: 23.64 KG/M2 | SYSTOLIC BLOOD PRESSURE: 118 MMHG

## 2021-10-06 DIAGNOSIS — L57.0 ACTINIC KERATOSIS: ICD-10-CM

## 2021-10-06 DIAGNOSIS — L21.9 SEBORRHEIC DERMATITIS OF SCALP: Primary | ICD-10-CM

## 2021-10-06 DIAGNOSIS — L72.0 MILIA: ICD-10-CM

## 2021-10-06 PROCEDURE — 99214 OFFICE O/P EST MOD 30 MIN: CPT | Performed by: FAMILY MEDICINE

## 2021-10-06 PROCEDURE — 17000 DESTRUCT PREMALG LESION: CPT | Performed by: FAMILY MEDICINE

## 2021-10-06 PROCEDURE — 17003 DESTRUCT PREMALG LES 2-14: CPT | Performed by: FAMILY MEDICINE

## 2021-10-06 RX ORDER — PENICILLIN V POTASSIUM 500 MG/1
TABLET ORAL
COMMUNITY
Start: 2021-08-26 | End: 2021-10-06

## 2022-01-04 ENCOUNTER — TELEPHONE (OUTPATIENT)
Dept: FAMILY MEDICINE CLINIC | Facility: CLINIC | Age: 77
End: 2022-01-04

## 2022-01-04 DIAGNOSIS — L21.9 SEBORRHEIC DERMATITIS OF SCALP: ICD-10-CM

## 2022-01-04 RX ORDER — KETOCONAZOLE 20 MG/ML
1 SHAMPOO TOPICAL DAILY
Qty: 120 ML | Refills: 3 | Status: SHIPPED | OUTPATIENT
Start: 2022-01-04 | End: 2022-07-29

## 2022-01-20 ENCOUNTER — OFFICE VISIT (OUTPATIENT)
Dept: FAMILY MEDICINE CLINIC | Facility: CLINIC | Age: 77
End: 2022-01-20
Payer: MEDICARE

## 2022-01-20 VITALS
BODY MASS INDEX: 23.07 KG/M2 | OXYGEN SATURATION: 98 % | WEIGHT: 130.2 LBS | DIASTOLIC BLOOD PRESSURE: 80 MMHG | SYSTOLIC BLOOD PRESSURE: 122 MMHG | TEMPERATURE: 97.2 F | HEIGHT: 63 IN | HEART RATE: 58 BPM

## 2022-01-20 DIAGNOSIS — L72.0 MILIA: ICD-10-CM

## 2022-01-20 DIAGNOSIS — L73.8 SEBACEOUS GLAND HYPERPLASIA OF FACE: ICD-10-CM

## 2022-01-20 DIAGNOSIS — L21.9 SEBORRHEIC DERMATITIS OF SCALP: Primary | ICD-10-CM

## 2022-01-20 DIAGNOSIS — U07.1 COVID-19: ICD-10-CM

## 2022-01-20 DIAGNOSIS — Z23 NEED FOR INFLUENZA VACCINATION: ICD-10-CM

## 2022-01-20 PROCEDURE — 99213 OFFICE O/P EST LOW 20 MIN: CPT | Performed by: FAMILY MEDICINE

## 2022-01-20 PROCEDURE — 90662 IIV NO PRSV INCREASED AG IM: CPT

## 2022-01-20 PROCEDURE — G0008 ADMIN INFLUENZA VIRUS VAC: HCPCS

## 2022-01-20 RX ORDER — FLUOCINOLONE ACETONIDE 0.11 MG/ML
OIL TOPICAL 2 TIMES WEEKLY
Qty: 118 ML | Refills: 2 | Status: SHIPPED | OUTPATIENT
Start: 2022-01-20

## 2022-01-20 RX ORDER — ALENDRONATE SODIUM 70 MG/1
TABLET ORAL
COMMUNITY
Start: 2022-01-05

## 2022-01-20 NOTE — PROGRESS NOTES
Assessment/Plan:    Advised to use Aquaphor ointment for her body  Will try fluocinolone oil for her scalp  Continue spironolactone  Follow-up her PCP for post COVID check  Flu vaccine given today  Problem List Items Addressed This Visit        Musculoskeletal and Integument    Seborrheic dermatitis of scalp - Primary    Relevant Medications    Fluocinolone Acetonide Scalp 0 01 % OIL    Milia    Relevant Medications    Fluocinolone Acetonide Scalp 0 01 % OIL       Other    Sebaceous gland hyperplasia of face    COVID-19      Other Visit Diagnoses     Need for influenza vaccination        Relevant Orders    influenza vaccine, high-dose, PF 0 7 mL (FLUZONE HIGH-DOSE) (Completed)            Subjective:      Patient ID: Agata Acosta is a 64 y o  female  75-year-old female follow-up skin and scalp issues  She is taking spironolactone and adapalene for her face to help with milia on a sebaceous hyperplasia that helping however her scalp is very dry and flaky she is medicated shampoo ketoconazole shampoo and that helped  She had COVID infection December 16th took 2 weeks to recover  She had to get antibody injection and still very fatigued tired she was dehydrated she went to the ER December 27 to get IV fluid hydration  She then developed a rash generalized on her body  She was diagnosed with COVID rash that is resolving still very itchy at times and she use moisturizer        The following portions of the patient's history were reviewed and updated as appropriate:   Past Medical History:  She has a past medical history of Actinic keratosis (7/1/2020), Alopecia (7/1/2020), Arthritis, Basal cell carcinoma (BCC) of skin of right lower extremity including hip (7/16/2021), Blister (nonthermal), right thigh, initial encounter (4/28/2021), Cancer ([facility] [address] ), GERD (gastroesophageal reflux disease), Hyperlipidemia, Hypertension, Milia (10/14/2020), Murmur, cardiac, Neoplasm of skin (7/1/2020), PONV (postoperative nausea and vomiting), 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), Seborrheic dermatitis of scalp (7/1/2020), 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 (Right, 10/23/2017); and Excision basal cell carcinoma  ,  _______________________________________________________________________  Family History:  family history includes Cancer in her father; Diabetes in her son; Heart disease in her mother, sister, and son; Hip dysplasia in her mother; Hodgkin's lymphoma in her father; Kidney disease in her son; Stroke in her father; Thyroid disease in her daughter and son ,  _______________________________________________________________________  Social History:   reports that she quit smoking about 33 years ago  She has a 12 50 pack-year smoking history  She has never used smokeless tobacco  She reports current alcohol use  She reports that she does not use drugs  ,  _______________________________________________________________________  Allergies:  has No Known Allergies     _______________________________________________________________________  Current Outpatient Medications   Medication Sig Dispense Refill    alendronate (FOSAMAX) 70 mg tablet       aspirin (ECOTRIN LOW STRENGTH) 81 mg EC tablet Take 81 mg by mouth every evening       calcium carbonate (OS-DENIS) 600 MG tablet Take 600 mg by mouth daily       doxycycline (PERIOSTAT) 20 MG tablet Take 20 mg by mouth daily      ezetimibe-simvastatin (VYTORIN) 10-20 mg per tablet Take 1 tablet by mouth daily at bedtime      famotidine (PEPCID) 20 mg tablet Take 20 mg by mouth 2 (two) times a day      ketoconazole (NIZORAL) 2 % shampoo Apply 1 application topically daily for 30 doses 120 mL 3    Multiple Vitamins-Minerals (CENTRUM SILVER 50+WOMEN PO) Take by mouth daily with breakfast      spironolactone (ALDACTONE) 50 mg tablet Take 0 5 tablets (25 mg total) by mouth daily 90 tablet 1    cholecalciferol (VITAMIN D3) 1,000 units tablet Take 1,000 Units by mouth daily (Patient not taking: Reported on 7/16/2021)      Fluocinolone Acetonide Scalp 0 01 % OIL Apply topically 2 (two) times a week Apply to scalp, leave over night 118 mL 2    vitamin E, tocopherol, 400 units capsule Take 400 Units by mouth daily (Patient not taking: Reported on 7/16/2021)       No current facility-administered medications for this visit      _______________________________________________________________________  Review of Systems   Constitutional: Negative for activity change, appetite change, fatigue and unexpected weight change  HENT: Negative for ear pain, sore throat, trouble swallowing and voice change  Eyes: Negative for photophobia and visual disturbance  Respiratory: Negative for cough, chest tightness, shortness of breath and wheezing  Cardiovascular: Negative for chest pain, palpitations and leg swelling  Gastrointestinal: Negative for abdominal pain, constipation, diarrhea, nausea, rectal pain and vomiting  Endocrine: Negative for cold intolerance, polydipsia and polyuria  Genitourinary: Negative for difficulty urinating, dysuria, flank pain, menstrual problem and pelvic pain  Musculoskeletal: Negative for arthralgias, joint swelling and myalgias  Skin: Negative for color change and rash  Allergic/Immunologic: Negative for environmental allergies and immunocompromised state     Neurological: Negative for dizziness, weakness, numbness and headaches  Hematological: Negative for adenopathy  Does not bruise/bleed easily  Psychiatric/Behavioral: Negative for decreased concentration, dysphoric mood, self-injury, sleep disturbance and suicidal ideas  The patient is not nervous/anxious  Objective:  Vitals:    01/20/22 1205   BP: 122/80   BP Location: Left arm   Patient Position: Sitting   Cuff Size: Standard   Pulse: 58   Temp: (!) 97 2 °F (36 2 °C)   TempSrc: Tympanic   SpO2: 98%   Weight: 59 1 kg (130 lb 3 2 oz)   Height: 5' 2 5" (1 588 m)     Body mass index is 23 43 kg/m²  Physical Exam  Vitals and nursing note reviewed  Constitutional:       Appearance: Normal appearance  She is normal weight  HENT:      Head: Normocephalic and atraumatic  Cardiovascular:      Pulses: Normal pulses  Pulmonary:      Effort: Pulmonary effort is normal    Skin:     General: Skin is warm and dry  Comments: Dry skin on her back and chest   Her scalp is tries flaky  Few milia on her face but much improved from before  Neurological:      Mental Status: She is alert and oriented to person, place, and time  Psychiatric:         Mood and Affect: Mood normal          Behavior: Behavior normal          Thought Content:  Thought content normal          Judgment: Judgment normal

## 2022-01-21 ENCOUNTER — OFFICE VISIT (OUTPATIENT)
Dept: PODIATRY | Facility: CLINIC | Age: 77
End: 2022-01-21
Payer: MEDICARE

## 2022-01-21 VITALS
HEART RATE: 58 BPM | BODY MASS INDEX: 23.04 KG/M2 | HEIGHT: 63 IN | RESPIRATION RATE: 17 BRPM | WEIGHT: 130 LBS | DIASTOLIC BLOOD PRESSURE: 80 MMHG | SYSTOLIC BLOOD PRESSURE: 122 MMHG

## 2022-01-21 DIAGNOSIS — M72.2 PLANTAR FASCIITIS, LEFT: ICD-10-CM

## 2022-01-21 DIAGNOSIS — M79.672 LEFT FOOT PAIN: ICD-10-CM

## 2022-01-21 DIAGNOSIS — M25.572 ARTHRALGIA OF BOTH FEET: Primary | ICD-10-CM

## 2022-01-21 DIAGNOSIS — R20.2 PARESTHESIA OF LEFT FOOT: ICD-10-CM

## 2022-01-21 DIAGNOSIS — M25.571 ARTHRALGIA OF BOTH FEET: Primary | ICD-10-CM

## 2022-01-21 PROCEDURE — 99213 OFFICE O/P EST LOW 20 MIN: CPT | Performed by: PODIATRIST

## 2022-01-21 RX ORDER — NAPROXEN 500 MG/1
500 TABLET ORAL 2 TIMES DAILY PRN
Qty: 30 TABLET | Refills: 0 | Status: SHIPPED | OUTPATIENT
Start: 2022-01-21 | End: 2022-05-20

## 2022-01-21 RX ORDER — GABAPENTIN 100 MG/1
100 CAPSULE ORAL
Qty: 30 CAPSULE | Refills: 0 | Status: SHIPPED | OUTPATIENT
Start: 2022-01-21 | End: 2022-05-20

## 2022-01-21 NOTE — PROGRESS NOTES
Assessment/Plan:   patient educated on stretching exercises, patient educated on RI CE therapy, patient refused steroid injection at this time, will use naproxen for pain and inflammation management, patient return in 2 weeks for re-evaluation    Patient will use naproxen for arthritis pain    Will start gabapentin regimen patient return in 2 weeks for re-evaluation       Diagnoses and all orders for this visit:    Arthralgia of both feet    Left foot pain    Plantar fasciitis, left    Paresthesia of left foot          Subjective:      Patient ID: Serena Tapia is a 64 y o  female  29-year-old female returns to the office with pain to the left foot, patient states that she feels like she is walking on the mosh , patient was recently diagnosed with COVID-19 infection, patient was sick for 2 weeks, patient states that her condition the same time of her of infection, patient also report pain to the bilateral midfoot, and around ankle, patient is using naproxen to date with the pain occasionally patient denies constitutional symptoms, patient denies recent trauma      The following portions of the patient's history were reviewed and updated as appropriate: allergies, current medications, past family history, past medical history, past social history, past surgical history and problem list     Review of Systems   Constitutional: Negative  Respiratory: Negative  Cardiovascular: Negative  Musculoskeletal: Positive for arthralgias and joint swelling  Skin: Positive for color change and rash                 Historical Information   Past Medical History:   Diagnosis Date    Actinic keratosis 7/1/2020    Alopecia 7/1/2020    Arthritis     Basal cell carcinoma (BCC) of skin of right lower extremity including hip 7/16/2021    Right anterior thigh, bx 4/23/2021    Blister (nonthermal), right thigh, initial encounter 4/28/2021    Cancer ([facility] [address] )     basal cell back, eyebrow and scalp    GERD (gastroesophageal reflux disease)     Hyperlipidemia     Hypertension     Milia 10/14/2020    Murmur, cardiac     2017 echocardiogram, slighty abnormal F/U in 2018    Neoplasm of skin 2020    R lateral brachial arm 1 2 x 1 4 cm    PONV (postoperative nausea and vomiting)     Sebaceous gland hyperplasia of face 10/14/2020    Seborrheic dermatitis of scalp 2020    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, right 2020    Right lateral brachial     Swelling of both lips 2021     Past Surgical History:   Procedure Laterality Date    BASAL CELL CARCINOMA EXCISION      BREAST SURGERY Bilateral     cyst    COLONOSCOPY  2013    x1    DILATION AND CURETTAGE OF UTERUS      HYSTERECTOMY      RACHEL    KS XCAPSL CTRC RMVL INSJ IO LENS PROSTH W/O ECP Left 10/2/2017    Procedure: EXTRACTION EXTRACAPSULAR CATARACT PHACO INTRAOCULAR LENS (IOL); Surgeon: Tiny Manzano MD;  Location: [facility] MAIN OR;  Service: Ophthalmology    KS XCAPSL CTRC RMVL INSJ IO LENS PROSTH W/O ECP Right 10/23/2017    Procedure: EXTRACTION EXTRACAPSULAR CATARACT PHACO INTRAOCULAR LENS (IOL);   Surgeon: Tiny Manzano MD;  Location: [facility] MAIN OR;  Service: Ophthalmology    SKIN BIOPSY      scalp, eyebrow left and midback basal cell     Social History   Social History     Substance and Sexual Activity   Alcohol Use Yes    Comment: socially     Social History     Substance and Sexual Activity   Drug Use No     Social History     Tobacco Use   Smoking Status Former Smoker    Packs/day: 0 50    Years: 25 00    Pack years: 12 50    Quit date:     Years since quittin 0   Smokeless Tobacco Never Used   Tobacco Comment    Never smoker - As per [place]      Family History:   Family History   Problem Relation Age of Onset    Heart disease Mother         quintuple bypass    Hip dysplasia Mother     Cancer Father         non-Hodgkin's lymphoma     Stroke Father     Hodgkin's lymphoma Father     Heart disease Sister valve issues    Thyroid disease Daughter     Diabetes Son     Heart disease Son     Kidney disease Son         stones    Thyroid disease Son        Meds/Allergies   all medications and allergies reviewed  No Known Allergies    Objective:      /80   Pulse 58   Resp 17   Ht 5' 2 5" (1 588 m)   Wt 59 kg (130 lb)   BMI 23 40 kg/m²          Physical Exam  Constitutional:       General: She is not in acute distress  Appearance: She is well-developed  She is not ill-appearing, toxic-appearing or diaphoretic  HENT:      Head: Normocephalic  Cardiovascular:      Pulses:           Dorsalis pedis pulses are 1+ on the right side and 1+ on the left side  Posterior tibial pulses are 0 on the right side and 0 on the left side  Comments: Palpable DP pulse, nonpalpable PT pulse, CFT is less than 3 seconds, temperature gradient within normal limit, a trophic skin changes noted with skin thinning in shiny, patient report occasional claudication to bilateral calf, localized edema foot and ankle Q9  Pulmonary:      Effort: Pulmonary effort is normal    Musculoskeletal:         General: Tenderness and deformity present  Comments: Pes planus type foot pain on palpation and range of motion of the 1st MPJ, metatarsal heads bilateral foot, pain on palpation on range of motion of the ST joint, crepitus noted in midfoot joint  plantar fasciitis, pain on palpation to the medial calcaneal tuberosity to the left heel and along the insertion of the plantar fascia mild localized  edema   Skin:     General: Skin is dry  Capillary Refill: Capillary refill takes 2 to 3 seconds  Comments: Trophic skin changes bilateral foot and ankle, alternating hypopigmentation and hyperpigmentation patches around the foot and ankle on anterior leg, skin is shiny and thin, absent hair growth, atrophy of fat pad  Neurological:      Mental Status: She is alert and oriented to person, place, and time  Sensory: Sensory deficit present  Motor: Atrophy present        Deep Tendon Reflexes: Reflexes abnormal       Comments: Protective sensation diminished bilateral lower extremity no focal motor deficit      Foot Exam    Right Foot/Ankle     Neurovascular  Dorsalis pedis: 1+  Posterior tibial: 0      Left Foot/Ankle      Neurovascular  Dorsalis pedis: 1+  Posterior tibial: 0

## 2022-03-12 ENCOUNTER — IMMUNIZATIONS (OUTPATIENT)
Dept: FAMILY MEDICINE CLINIC | Facility: HOSPITAL | Age: 77
End: 2022-03-12

## 2022-03-12 PROCEDURE — 91305 COVID-19 PFIZER VACC TRIS-SUCROSE GRAY CAP 0.3 ML: CPT

## 2022-03-12 PROCEDURE — 0051A COVID-19 PFIZER VACC TRIS-SUCROSE GRAY CAP 0.3 ML: CPT

## 2022-05-16 ENCOUNTER — RA CDI HCC (OUTPATIENT)
Dept: OTHER | Facility: HOSPITAL | Age: 77
End: 2022-05-16

## 2022-05-16 NOTE — PROGRESS NOTES
[facility] [address]  coding opportunities       Chart reviewed, no opportunity found: CHART REVIEWED, NO OPPORTUNITY FOUND        Patients Insurance     Medicare Insurance: Medicare

## 2022-05-20 ENCOUNTER — OFFICE VISIT (OUTPATIENT)
Dept: FAMILY MEDICINE CLINIC | Facility: CLINIC | Age: 77
End: 2022-05-20
Payer: MEDICARE

## 2022-05-20 VITALS
TEMPERATURE: 96.8 F | HEIGHT: 63 IN | OXYGEN SATURATION: 96 % | SYSTOLIC BLOOD PRESSURE: 114 MMHG | BODY MASS INDEX: 23.04 KG/M2 | DIASTOLIC BLOOD PRESSURE: 72 MMHG | HEART RATE: 59 BPM | WEIGHT: 130 LBS

## 2022-05-20 DIAGNOSIS — L57.0 ACTINIC KERATOSIS: ICD-10-CM

## 2022-05-20 DIAGNOSIS — L21.9 SEBORRHEIC DERMATITIS OF SCALP: Primary | ICD-10-CM

## 2022-05-20 DIAGNOSIS — L73.8 SEBACEOUS GLAND HYPERPLASIA OF FACE: ICD-10-CM

## 2022-05-20 PROCEDURE — 99214 OFFICE O/P EST MOD 30 MIN: CPT | Performed by: FAMILY MEDICINE

## 2022-05-20 PROCEDURE — 17000 DESTRUCT PREMALG LESION: CPT | Performed by: FAMILY MEDICINE

## 2022-05-20 PROCEDURE — 17003 DESTRUCT PREMALG LES 2-14: CPT | Performed by: FAMILY MEDICINE

## 2022-05-20 NOTE — PROGRESS NOTES
Assessment/Plan:    Lesions on her scalp with treated  Advised for sun screen  Continue spironolactone for now it does keep her sebaceous hyperplasia milia at [facility]  Will see her back in 2 months retreat her scalp  I have spent 30 minutes with Patient  today in which greater than 50% of this time was spent in counseling/coordination of care regarding Prognosis, Risks and benefits of tx options and Intructions for management  Problem List Items Addressed This Visit        Musculoskeletal and Integument    Seborrheic dermatitis of scalp - Primary    Actinic keratosis    Relevant Orders    Lesion Destruction (Completed)       Other    Sebaceous gland hyperplasia of face            Subjective:      Patient ID: Marily Wheeler is a 64 y o  female  73-year-old female follow-up skin lesion on her scalp and her face  She was recently diagnosed with primary hyperparathyroidism with endocrine  She has been treated for osteopenia with Fosamax  She is being monitored for now no surgery is recommended  She is taking spironolactone 25 mg daily for face and she has been using a lot at topical fluocinolone oil for her scalp  She has a lot of milia on her face        The following portions of the patient's history were reviewed and updated as appropriate:   Past Medical History:  She has a past medical history of Actinic keratosis (7/1/2020), Alopecia (7/1/2020), Arthritis, Basal cell carcinoma (BCC) of skin of right lower extremity including hip (7/16/2021), Blister (nonthermal), right thigh, initial encounter (4/28/2021), Cancer ([facility] [address] ), GERD (gastroesophageal reflux disease), Hyperlipidemia, Hypertension, Milia (10/14/2020), Murmur, cardiac, Neoplasm of skin (7/1/2020), PONV (postoperative nausea and vomiting), 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), Seborrheic dermatitis of scalp (7/1/2020), 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, right (8/12/2020), and Swelling of both lips (4/12/2021)  ,  _______________________________________________________________________  Medical Problems:  does not have any pertinent problems on file ,  _______________________________________________________________________  Past Surgical History:   has a past surgical history that includes Hysterectomy; Dilation and curettage of uterus; Colonoscopy (2013); Breast surgery (Bilateral); Skin biopsy; pr xcapsl [facility] rmvl insj io lens prosth w/o ecp (Left, 10/2/2017); pr xcapsl [facility] rmvl insj io lens prosth w/o ecp (Right, 10/23/2017); and Excision basal cell carcinoma  ,  _______________________________________________________________________  Family History:  family history includes Cancer in her father; Diabetes in her son; Heart disease in her mother, sister, and son; Hip dysplasia in her mother; Hodgkin's lymphoma in her father; Kidney disease in her son; Stroke in her father; Thyroid disease in her daughter and son ,  _______________________________________________________________________  Social History:   reports that she quit smoking about 33 years ago  She has a 12 50 pack-year smoking history  She has never used smokeless tobacco  She reports current alcohol use  She reports that she does not use drugs  ,  _______________________________________________________________________  Allergies:  has No Known Allergies     _______________________________________________________________________  Current Outpatient Medications   Medication Sig Dispense Refill    alendronate (FOSAMAX) 70 mg tablet       aspirin (ECOTRIN LOW STRENGTH) 81 mg EC tablet Take 81 mg by mouth every evening 3 x per week      doxycycline (PERIOSTAT) 20 MG tablet Take 20 mg by mouth daily      ezetimibe-simvastatin (VYTORIN) 10-20 mg per tablet Take 1 tablet by mouth daily at bedtime      famotidine (PEPCID) 20 mg tablet Take 20 mg by mouth 2 (two) times a day      Fluocinolone Acetonide Scalp 0 01 % OIL Apply topically 2 (two) times a week Apply to scalp, leave over night 118 mL 2    Multiple Vitamins-Minerals (CENTRUM SILVER 50+WOMEN PO) Take by mouth daily with breakfast      spironolactone (ALDACTONE) 50 mg tablet Take 0 5 tablets (25 mg total) by mouth daily 90 tablet 1    calcium carbonate (OS-DENIS) 600 MG tablet Take 600 mg by mouth daily       ketoconazole (NIZORAL) 2 % shampoo Apply 1 application topically daily for 30 doses 120 mL 3     No current facility-administered medications for this visit      _______________________________________________________________________  Review of Systems   Constitutional: Negative for activity change, appetite change, fatigue and unexpected weight change  HENT: Negative for ear pain, sore throat, trouble swallowing and voice change  Eyes: Negative for photophobia and visual disturbance  Respiratory: Negative for cough, chest tightness, shortness of breath and wheezing  Cardiovascular: Negative for chest pain, palpitations and leg swelling  Gastrointestinal: Negative for abdominal pain, constipation, diarrhea, nausea, rectal pain and vomiting  Endocrine: Negative for cold intolerance, polydipsia and polyuria  Genitourinary: Negative for difficulty urinating, dysuria, flank pain, menstrual problem and pelvic pain  Musculoskeletal: Negative for arthralgias, joint swelling and myalgias  Skin: Negative for color change and rash  Allergic/Immunologic: Negative for environmental allergies and immunocompromised state  Neurological: Negative for dizziness, weakness, numbness and headaches  Hematological: Negative for adenopathy  Does not bruise/bleed easily  Psychiatric/Behavioral: Negative for decreased concentration, dysphoric mood, self-injury, sleep disturbance and suicidal ideas  The patient is not nervous/anxious            Objective:  Vitals:    05/20/22 1146   BP: 114/72   BP Location: Left arm   Patient Position: Sitting   Cuff Size: Standard   Pulse: 59   Temp: (!) 96 8 °F (36 °C)   TempSrc: Temporal   SpO2: 96%   Weight: 59 kg (130 lb)   Height: 5' 2 5" (1 588 m)     Body mass index is 23 4 kg/m²  Physical Exam  Vitals and nursing note reviewed  Constitutional:       Appearance: Normal appearance  HENT:      Head: Normocephalic and atraumatic  Pulmonary:      Effort: Pulmonary effort is normal       Breath sounds: Normal breath sounds  Skin:     Findings: Lesion present  Comments: Several AK on scalp with SK  Several milia on her chin forehead and cheeks   Neurological:      General: No focal deficit present  Mental Status: She is alert and oriented to person, place, and time  Mental status is at baseline  Psychiatric:         Mood and Affect: Mood normal          Behavior: Behavior normal          Thought Content: Thought content normal          Judgment: Judgment normal        Lesion Destruction    Date/Time: 5/20/2022 12:41 PM  Performed by: Mariya Peterson MD  Authorized by: Mariya Peterson MD   Universal Protocol:  Consent: Verbal consent obtained  Risks and benefits: risks, benefits and alternatives were discussed  Consent given by: patient  Time out: Immediately prior to procedure a "time out" was called to verify the correct patient, procedure, equipment, support staff and site/side marked as required    Timeout called at: 5/20/2022 12:41 PM   Patient understanding: patient states understanding of the procedure being performed  Patient consent: the patient's understanding of the procedure matches consent given  Procedure consent: procedure consent matches procedure scheduled  Patient identity confirmed: verbally with patient      Procedure Details - Lesion Destruction:     Number of Lesions:  6  Lesion 1:     Body area:  Head/neck    Head/neck location:  Scalp    Initial size (mm):  3    Final defect size (mm):  3    Malignancy: pre-malignant lesion      Destruction method: cryotherapy    Lesion 2:     Body area:  [address] location:  Scalp Initial size (mm):  3    Final defect size (mm):  3    Malignancy: pre-malignant lesion      Destruction method: cryotherapy    Lesion 3:     Body area:  Head/neck    Head/neck location:  Scalp    Initial size (mm):  2    Final defect size (mm):  2    Malignancy: pre-malignant lesion      Destruction method: cryotherapy    Lesion 4:     Body area:  Head/neck    Head/neck location:  Scalp    Initial size (mm):  2    Final defect size (mm):  2    Malignancy: pre-malignant lesion      Destruction method: cryotherapy    Lesion 5:     Body area:  Head/neck    Head/neck location:  Scalp    Inital size (mm):  3    Final defect size (mm):  3    Malignancy: pre-malignant lesion      Destruction method: cryotherapy    Lesion 6:     Body area:  Head/neck    Head/neck location:  Scalp    Initial size (mm):  4    Final defect size (mm):  4    Malignancy: pre-malignant lesion      Destruction method: cryotherapy

## 2022-07-29 ENCOUNTER — OFFICE VISIT (OUTPATIENT)
Dept: FAMILY MEDICINE CLINIC | Facility: CLINIC | Age: 77
End: 2022-07-29
Payer: MEDICARE

## 2022-07-29 VITALS
TEMPERATURE: 97.8 F | RESPIRATION RATE: 16 BRPM | DIASTOLIC BLOOD PRESSURE: 70 MMHG | SYSTOLIC BLOOD PRESSURE: 120 MMHG | WEIGHT: 130.8 LBS | HEIGHT: 63 IN | HEART RATE: 60 BPM | OXYGEN SATURATION: 96 % | BODY MASS INDEX: 23.18 KG/M2

## 2022-07-29 DIAGNOSIS — L21.9 SEBORRHEIC DERMATITIS OF SCALP: ICD-10-CM

## 2022-07-29 DIAGNOSIS — L57.0 ACTINIC KERATOSIS: Primary | ICD-10-CM

## 2022-07-29 PROCEDURE — 99213 OFFICE O/P EST LOW 20 MIN: CPT | Performed by: FAMILY MEDICINE

## 2022-07-29 PROCEDURE — 17000 DESTRUCT PREMALG LESION: CPT | Performed by: FAMILY MEDICINE

## 2022-07-29 PROCEDURE — 17003 DESTRUCT PREMALG LES 2-14: CPT | Performed by: FAMILY MEDICINE

## 2022-07-29 NOTE — PROGRESS NOTES
Assessment/Plan:    Patient tolerate cryo treatment well for her scalp  Advised for sun protection  Continue spironolactone for now she is doing well will see her back in 4 months for skin check retreat her scalp if needed  I have spent 20 minutes with Patient  today in which greater than 50% of this time was spent in counseling/coordination of care regarding Prognosis, Risks and benefits of tx options and Intructions for management  Problem List Items Addressed This Visit        Musculoskeletal and Integument    Seborrheic dermatitis of scalp    Actinic keratosis - Primary    Relevant Orders    Lesion Destruction (Completed)            Subjective:      Patient ID: Chris Berman is a 76 y o  female  68-year-old female follow-up actinic keratoses and seborrheic dermatitis of her scalp  She has had her hair done today she has a lot of dry scaly spot on her scalp  She had history of squamous cell skin cancer that gap biopsy done  She is using sun protection for her skin  She taking spironolactone for his sebaceous hyperplasia and multiple milia on her face she doing well  The following portions of the patient's history were reviewed and updated as appropriate:   Past Medical History:  She has a past medical history of Actinic keratosis (7/1/2020), Alopecia (7/1/2020), Arthritis, Basal cell carcinoma (BCC) of skin of right lower extremity including hip (7/16/2021), Blister (nonthermal), right thigh, initial encounter (4/28/2021), [facility] GERD (gastroesophageal reflux disease), Hyperlipidemia, Hypertension, Milia (10/14/2020), Murmur, cardiac, Neoplasm of skin (7/1/2020), PONV (postoperative nausea and vomiting), 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), Seborrheic dermatitis of scalp (7/1/2020), 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, right (8/12/2020), and Swelling of both lips (4/12/2021)  ,  _______________________________________________________________________  Medical Problems:  does not have any pertinent problems on file ,  _______________________________________________________________________  Past Surgical History:   has a past surgical history that includes Hysterectomy; Dilation and curettage of uterus; Colonoscopy (2013); Breast surgery (Bilateral); Skin biopsy; pr xcapsl [facility]tr[facility] rmvl insj io lens prosth w/o ecp (Left, 10/2/2017); pr xcapsl [facility]tr[facility] rmvl insj io lens prosth w/o ecp (Right, 10/23/2017); and Excision basal cell carcinoma  ,  _______________________________________________________________________  Family History:  family history includes Cancer in her father; Diabetes in her son; Heart disease in her mother, sister, and son; Hip dysplasia in her mother; Hodgkin's lymphoma in her father; Kidney disease in her son; Stroke in her father; Thyroid disease in her daughter and son ,  _______________________________________________________________________  Social History:   reports that she quit smoking about 33 years ago  She has a 12 50 pack-year smoking history  She has never used smokeless tobacco  She reports current alcohol use  She reports that she does not use drugs  ,  _______________________________________________________________________  Allergies:  has No Known Allergies     _______________________________________________________________________  Current Outpatient Medications   Medication Sig Dispense Refill    alendronate (FOSAMAX) 70 mg tablet       aspirin (ECOTRIN LOW STRENGTH) 81 mg EC tablet Take 81 mg by mouth every evening 3 x per week      doxycycline (PERIOSTAT) 20 MG tablet Take 20 mg by mouth daily      ezetimibe-simvastatin (VYTORIN) 10-20 mg per tablet Take 1 tablet by mouth daily at bedtime      famotidine (PEPCID) 20 mg tablet Take 20 mg by mouth daily      Fluocinolone Acetonide Scalp 0 01 % OIL Apply topically 2 (two) times a week Apply to scalp, leave over night 118 mL 2    ketoconazole (NIZORAL) 2 % shampoo Apply 1 application topically daily for 30 doses 120 mL 3    Multiple Vitamins-Minerals (CENTRUM SILVER 50+WOMEN PO) Take by mouth daily with breakfast      spironolactone (ALDACTONE) 50 mg tablet Take 0 5 tablets (25 mg total) by mouth daily 90 tablet 1     No current facility-administered medications for this visit      _______________________________________________________________________  Review of Systems   Constitutional: Negative for activity change, appetite change, fatigue and unexpected weight change  HENT: Negative for ear pain, sore throat, trouble swallowing and voice change  Eyes: Negative for photophobia and visual disturbance  Respiratory: Negative for cough, chest tightness, shortness of breath and wheezing  Cardiovascular: Negative for chest pain, palpitations and leg swelling  Gastrointestinal: Negative for abdominal pain, constipation, diarrhea, nausea, rectal pain and vomiting  Endocrine: Negative for cold intolerance, polydipsia and polyuria  Genitourinary: Negative for difficulty urinating, dysuria, flank pain, menstrual problem and pelvic pain  Musculoskeletal: Negative for arthralgias, joint swelling and myalgias  Skin: Negative for color change and rash  Allergic/Immunologic: Negative for environmental allergies and immunocompromised state  Neurological: Negative for dizziness, weakness, numbness and headaches  Hematological: Negative for adenopathy  Does not bruise/bleed easily  Psychiatric/Behavioral: Negative for decreased concentration, dysphoric mood, self-injury, sleep disturbance and suicidal ideas  The patient is not nervous/anxious  Objective:  Vitals:    07/29/22 1145   BP: 120/70   BP Location: Right arm   Patient Position: Sitting   Cuff Size: Standard   Pulse: 60   Resp: 16   Temp: 97 8 °F (36 6 °C)   TempSrc: Temporal   SpO2: 96%   Weight: 59 3 kg (130 lb 12 8 oz)   Height: 5' 2 5" (1 588 m)     Body mass index is 23 54 kg/m²       Physical Exam  Vitals and nursing note reviewed  Constitutional:       Appearance: Normal appearance  HENT:      Head: Normocephalic and atraumatic  Pulmonary:      Effort: Pulmonary effort is normal    Skin:     Comments: Multiple actinic keratoses on her scalp   Neurological:      Mental Status: She is alert  Lesion Destruction    Date/Time: 7/29/2022 1:09 PM  Performed by: Elizabeth Kruse MD  Authorized by: Elizabeth Kruse MD   Universal Protocol:  Consent: Verbal consent obtained    Risks and benefits: risks, benefits and alternatives were discussed  Consent given by: patient  Timeout called at: 7/29/2022 1:09 PM   Patient understanding: patient states understanding of the procedure being performed  Patient consent: the patient's understanding of the procedure matches consent given  Patient identity confirmed: verbally with patient      Procedure Details - Lesion Destruction:     Number of Lesions:  6  Lesion 1:     Body area:  Head/neck    Head/neck location:  Scalp    Initial size (mm):  4    Final defect size (mm):  4    Malignancy: pre-malignant lesion      Destruction method: cryotherapy    Lesion 2:     Body area:  Head/neck    Head/neck location:  Scalp    Initial size (mm):  3    Final defect size (mm):  3    Malignancy: pre-malignant lesion      Destruction method: cryotherapy    Lesion 3:     Body area:  Head/neck    Head/neck location:  Scalp    Initial size (mm):  3    Final defect size (mm):  3    Malignancy: pre-malignant lesion      Destruction method: cryotherapy    Lesion 4:     Body area:  Head/neck    Head/neck location:  Scalp    Initial size (mm):  3    Final defect size (mm):  3    Malignancy: pre-malignant lesion      Destruction method: cryotherapy    Lesion 5:     Body area:  Head/neck    Head/neck location:  Scalp    Inital size (mm):  3    Final defect size (mm):  3    Malignancy: pre-malignant lesion      Destruction method: cryotherapy    Lesion 6:     Body area:  Head/neck    Head/neck location:  Scalp    Initial size (mm):  2    Final defect size (mm):  2    Malignancy: pre-malignant lesion      Destruction method: cryotherapy

## 2022-09-04 DIAGNOSIS — L65.9 ALOPECIA: ICD-10-CM

## 2022-09-07 DIAGNOSIS — L65.9 ALOPECIA: ICD-10-CM

## 2022-09-07 RX ORDER — SPIRONOLACTONE 50 MG/1
TABLET, FILM COATED ORAL
Qty: 45 TABLET | Refills: 1 | Status: SHIPPED | OUTPATIENT
Start: 2022-09-07 | End: 2022-09-07

## 2022-09-07 RX ORDER — SPIRONOLACTONE 50 MG/1
TABLET, FILM COATED ORAL
Qty: 45 TABLET | Refills: 1 | Status: SHIPPED | OUTPATIENT
Start: 2022-09-07

## 2022-11-23 RX ORDER — ASCORBIC ACID 500 MG
50 TABLET ORAL
COMMUNITY
Start: 2022-08-15

## 2022-11-28 ENCOUNTER — OFFICE VISIT (OUTPATIENT)
Dept: FAMILY MEDICINE CLINIC | Facility: CLINIC | Age: 77
End: 2022-11-28

## 2022-11-28 VITALS
HEART RATE: 55 BPM | DIASTOLIC BLOOD PRESSURE: 80 MMHG | RESPIRATION RATE: 16 BRPM | TEMPERATURE: 97.8 F | BODY MASS INDEX: 23.71 KG/M2 | WEIGHT: 133.8 LBS | HEIGHT: 63 IN | OXYGEN SATURATION: 97 % | SYSTOLIC BLOOD PRESSURE: 140 MMHG

## 2022-11-28 DIAGNOSIS — L21.9 SEBORRHEIC DERMATITIS OF SCALP: ICD-10-CM

## 2022-11-28 DIAGNOSIS — L57.0 ACTINIC KERATOSIS: ICD-10-CM

## 2022-11-28 DIAGNOSIS — L72.0 MILIA: Primary | ICD-10-CM

## 2022-11-28 DIAGNOSIS — L98.9 SKIN LESION: ICD-10-CM

## 2022-11-28 RX ORDER — KETOCONAZOLE 20 MG/ML
1 SHAMPOO TOPICAL DAILY
Qty: 120 ML | Refills: 3 | Status: SHIPPED | OUTPATIENT
Start: 2022-11-28 | End: 2022-12-28

## 2022-11-28 NOTE — PROGRESS NOTES
Name: Mari Begum      : 1945      MRN: [id number]  Encounter Provider: China Ardon MD  Encounter Date: 2022   Encounter department: [address] [place] [address]       Pt doing well, milia on face resolved-stop spironolactone  AK on scalp done w cryo  Refer to derm for suspicious lesion on chest suspect AK vs SK vs cutanous horn w squamous cell ca    I have spent 30 minutes with Patient  today in which greater than 50% of this time was spent in counseling/coordination of care regarding Prognosis, Risks and benefits of tx options and Intructions for management  1  Milia    2  Seborrheic dermatitis of scalp  -     ketoconazole (NIZORAL) 2 % shampoo; Apply 1 application topically daily for 30 doses    3  Actinic keratosis  -     Lesion Destruction    4  Skin lesion  -     Ambulatory Referral to Dermatology; Future         Subjective      75 yo F fu milia on face which is well controlled on spironolactone 25 mg daily  Pt 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, h/o basal cell skin cancer on lower extremity  Has few AK and SK on scalp  Using ketoconazole shampoo  Review of Systems   Constitutional: Negative for activity change, appetite change, fatigue and unexpected weight change  HENT: Negative for ear pain, sore throat, trouble swallowing and voice change  Eyes: Negative for photophobia and visual disturbance  Respiratory: Negative for cough, chest tightness, shortness of breath and wheezing  Cardiovascular: Negative for chest pain, palpitations and leg swelling  Gastrointestinal: Negative for abdominal pain, constipation, diarrhea, nausea, rectal pain and vomiting  Endocrine: Negative for cold intolerance, polydipsia and polyuria  Genitourinary: Negative for difficulty urinating, dysuria, flank pain, menstrual problem and pelvic pain  Musculoskeletal: Negative for arthralgias, joint swelling and myalgias     Skin: Negative for color change and rash  Allergic/Immunologic: Negative for environmental allergies and immunocompromised state  Neurological: Negative for dizziness, weakness, numbness and headaches  Hematological: Negative for adenopathy  Does not bruise/bleed easily  Psychiatric/Behavioral: Negative for decreased concentration, dysphoric mood, self-injury, sleep disturbance and suicidal ideas  The patient is not nervous/anxious  Current Outpatient Medications on File Prior to Visit   Medication Sig   • alendronate (FOSAMAX) 70 mg tablet    • aspirin (ECOTRIN LOW STRENGTH) 81 mg EC tablet Take 81 mg by mouth every evening 3 x per week   • Chelated Zinc 50 MG TABS 50 mg   • doxycycline (PERIOSTAT) 20 MG tablet Take 20 mg by mouth daily   • ezetimibe-simvastatin (VYTORIN) 10-20 mg per tablet Take 1 tablet by mouth daily at bedtime   • famotidine (PEPCID) 20 mg tablet Take 20 mg by mouth daily   • Fluocinolone Acetonide Scalp 0 01 % OIL Apply topically 2 (two) times a week Apply to scalp, leave over night   • Multiple Vitamins-Minerals (CENTRUM SILVER 50+WOMEN PO) Take by mouth daily with breakfast   • [DISCONTINUED] ketoconazole (NIZORAL) 2 % shampoo Apply 1 application topically daily for 30 doses   • [DISCONTINUED] spironolactone (ALDACTONE) 50 mg tablet TAKE ONE-HALF TABLET BY  MOUTH DAILY       Objective     /80 (BP Location: Left arm, Patient Position: Sitting, Cuff Size: Standard)   Pulse 55   Temp 97 8 °F (36 6 °C) (Temporal)   Resp 16   Ht 5' 2 5" (1 588 m)   Wt 60 7 kg (133 lb 12 8 oz)   SpO2 97%   BMI 24 08 kg/m²     Physical Exam  Vitals and nursing note reviewed  Constitutional:       Appearance: Normal appearance  HENT:      Head: Normocephalic and atraumatic  Pulmonary:      Effort: Pulmonary effort is normal    Musculoskeletal:         General: Normal range of motion  Skin:     General: Skin is warm and dry        Comments: Several AK and SK on scalp/legs/arms  Mid chest 4x4 mm raised scaly pearly appearance   Neurological:      Mental Status: She is alert and oriented to person, place, and time  Psychiatric:         Mood and Affect: Mood normal          Behavior: Behavior normal          Thought Content: Thought content normal          Judgment: Judgment normal      Lesion Destruction    Date/Time: 11/28/2022 11:09 AM  Performed by: Uzma Adams MD  Authorized by: Uzma Adams MD   Universal Protocol:  Consent: Verbal consent obtained    Risks and benefits: risks, benefits and alternatives were discussed  Consent given by: patient  Timeout called at: 11/28/2022 11:09 AM   Patient understanding: patient states understanding of the procedure being performed  Patient consent: the patient's understanding of the procedure matches consent given  Procedure consent: procedure consent matches procedure scheduled  Relevant documents: relevant documents present and verified  Patient identity confirmed: verbally with patient      Procedure Details - Lesion Destruction:     Number of Lesions:  6  Lesion 1:     Body area:  Head/neck    Head/neck location:  Scalp    Initial size (mm):  3    Final defect size (mm):  3    Malignancy: pre-malignant lesion      Destruction method: cryotherapy    Lesion 2:     Body area:  Head/neck    Head/neck location:  Scalp    Initial size (mm):  4    Final defect size (mm):  4    Malignancy: pre-malignant lesion      Destruction method: cryotherapy    Lesion 3:     Body area:  Head/neck    Head/neck location:  Scalp    Initial size (mm):  4    Final defect size (mm):  4    Malignancy: pre-malignant lesion      Destruction method: cryotherapy    Lesion 4:     Body area:  Trunk    Trunk location:  Chest    Initial size (mm):  4    Final defect size (mm):  4    Malignancy: pre-malignant lesion      Destruction method: cryotherapy    Lesion 5:     Body area:  Lower extremity    Lower extremity location:  R lower leg    Inital size (mm):  3    Final defect size (mm):  3 Malignancy: pre-malignant lesion      Destruction method: cryotherapy    Lesion 6:     Body area:  Lower extremity    Lower extremity location:  L hip    Initial size (mm):  3    Final defect size (mm):  3    Malignancy: pre-malignant lesion      Destruction method: cryotherapy          Cally Mclean MD

## 2023-03-16 ENCOUNTER — CONSULT (OUTPATIENT)
Dept: DERMATOLOGY | Age: 78
End: 2023-03-16

## 2023-03-16 VITALS — WEIGHT: 130.9 LBS | TEMPERATURE: 98.3 F | HEIGHT: 63 IN | BODY MASS INDEX: 23.2 KG/M2

## 2023-03-16 DIAGNOSIS — D22.60 MULTIPLE BENIGN MELANOCYTIC NEVI OF UPPER EXTREMITY, LOWER EXTREMITY, AND TRUNK: ICD-10-CM

## 2023-03-16 DIAGNOSIS — L81.4 SOLAR LENTIGO: ICD-10-CM

## 2023-03-16 DIAGNOSIS — L72.0 MILIUM: ICD-10-CM

## 2023-03-16 DIAGNOSIS — Z85.828 HISTORY OF BASAL CELL CARCINOMA: ICD-10-CM

## 2023-03-16 DIAGNOSIS — D22.5 MULTIPLE BENIGN MELANOCYTIC NEVI OF UPPER EXTREMITY, LOWER EXTREMITY, AND TRUNK: ICD-10-CM

## 2023-03-16 DIAGNOSIS — Z85.89 HISTORY OF SQUAMOUS CELL CARCINOMA: ICD-10-CM

## 2023-03-16 DIAGNOSIS — D18.01 CHERRY ANGIOMA: Primary | ICD-10-CM

## 2023-03-16 DIAGNOSIS — D22.70 MULTIPLE BENIGN MELANOCYTIC NEVI OF UPPER EXTREMITY, LOWER EXTREMITY, AND TRUNK: ICD-10-CM

## 2023-03-16 DIAGNOSIS — L98.9 SKIN LESION: ICD-10-CM

## 2023-03-16 DIAGNOSIS — L57.0 KERATOSIS, ACTINIC: ICD-10-CM

## 2023-03-16 DIAGNOSIS — R20.2 NOTALGIA PARESTHETICA: ICD-10-CM

## 2023-03-16 DIAGNOSIS — L72.0 EPIDERMAL CYST: ICD-10-CM

## 2023-03-16 DIAGNOSIS — L82.1 SEBORRHEIC KERATOSES: ICD-10-CM

## 2023-03-16 NOTE — PATIENT INSTRUCTIONS
HISTORY OF BASAL CELL CARCINOMA    Assessment and Plan:  Based on a thorough discussion of this condition and the management approach to it (including a comprehensive discussion of the known risks, side effects and potential benefits of treatment), the patient (family) agrees to implement the following specific plan:  When outside we recommend using a wide brim hat, sunglasses, long sleeve and pants, sunscreen with SPF 83+ with reapplication every 2 hours, or SPF specific clothing     How can basal cell carcinoma be prevented? The most important way to prevent BCC is to avoid sunburn  This is especially important in childhood and early life  Fair skinned individuals and those with a personal or family history of BCC should protect their skin from sun exposure daily, year-round and lifelong  Stay indoors or under the shade in the middle of the day   Wear covering clothing   Apply high protection factor SPF50+ broad-spectrum sunscreens generously to exposed skin if outdoors   Avoid indoor tanning (sun beds, solaria)  Oral nicotinamide (vitamin B3) in a dose of 500 mg twice daily may reduce the number and severity of BCCs  What is the outlook for basal cell carcinoma? Most BCCs are cured by treatment  Cure is most likely if treatment is undertaken when the lesion is small  About 50% of people with BCC develop a second one within 3 years of the first  They are also at increased risk of other skin cancers, especially melanoma  Regular self-skin examinations and long-term annual skin checks by an experienced health professional are recommended      HISTORY OF SQUAMOUS CELL CARCINOMA     Assessment and Plan:  Based on a thorough discussion of this condition and the management approach to it (including a comprehensive discussion of the known risks, side effects and potential benefits of treatment), the patient (family) agrees to implement the following specific plan:  When outside we recommend using a wide brim hat, sunglasses, long sleeve and pants, sunscreen with SPF 70+ with reapplication every 2 hours, or SPF specific clothing     How can SCC be prevented? The most important way to prevent SCC is to avoid sunburn  This is especially important in childhood and early life  Fair skinned individuals and those with a personal or family history of BCC should protect their skin from sun exposure daily, year-round and lifelong  Stay indoors or under the shade in the middle of the day   Wear covering clothing   Apply high protection factor SPF50+ broad-spectrum sunscreens generously to exposed skin if outdoors   Avoid indoor tanning (sun beds, solaria)      What is the outlook for SCC? Most SCC are cured by treatment  Cure is most likely if treatment is undertaken when the lesion is small  A small percent of SCC's can spread to lymph  nodes and long term monitoring is indicated  They are also at increased risk of other skin cancers, especially melanoma  Regular self-skin examinations and long-term annual skin checks by an experienced health professional are recommended  MELANOCYTIC NEVI ("Moles")       Assessment and Plan:  Based on a thorough discussion of this condition and the management approach to it (including a comprehensive discussion of the known risks, side effects and potential benefits of treatment), the patient (family) agrees to implement the following specific plan:  When outside we recommend using a wide brim hat, sunglasses, long sleeve and pants, sunscreen with SPF 03+ with reapplication every 2 hours, or SPF specific clothing   Benign, reassured  Every 8 month skin check     Melanocytic Nevi  Melanocytic nevi ("moles") are tan or brown, raised or flat areas of the skin which have an increased number of melanocytes  Melanocytes are the cells in our body which make pigment and account for skin color      Some moles are present at birth (I e , "congenital nevi"), while others come up later in life (i e , "acquired nevi")  The sun can stimulate the body to make more moles  Sunburns are not the only thing that triggers more moles  Chronic sun exposure can do it too  Clinically distinguishing a healthy mole from melanoma may be difficult, even for experienced dermatologists  The "ABCDE's" of moles have been suggested as a means of helping to alert a person to a suspicious mole and the possible increased risk of melanoma  The suggestions for raising alert are as follows:    Asymmetry: Healthy moles tend to be symmetric, while melanomas are often asymmetric  Asymmetry means if you draw a line through the mole, the two halves do not match in color, size, shape, or surface texture  Asymmetry can be a result of rapid enlargement of a mole, the development of a raised area on a previously flat lesion, scaling, ulceration, bleeding or scabbing within the mole  Any mole that starts to demonstrate "asymmetry" should be examined promptly by a board certified dermatologist      Border: Healthy moles tend to have discrete, even borders  The border of a melanoma often blends into the normal skin and does not sharply delineate the mole from normal skin  Any mole that starts to demonstrate "uneven borders" should be examined promptly by a board certified dermatologist      Color: Healthy moles tend to be one color throughout  Melanomas tend to be made up of different colors ranging from dark black, blue, white, or red  Any mole that demonstrates a color change should be examined promptly by a board certified dermatologist      Diameter: Healthy moles tend to be smaller than 0 6 cm in size; an exception are "congenital nevi" that can be larger  Melanomas tend to grow and can often be greater than 0 6 cm (1/4 of an inch, or the size of a pencil eraser)  This is only a guideline, and many normal moles may be larger than 0 6 cm without being unhealthy    Any mole that starts to change in size (small to bigger or bigger to smaller) should be examined promptly by a board certified dermatologist      Evolving: Healthy moles tend to "stay the same "  Melanomas may often show signs of change or evolution such as a change in size, shape, color, or elevation  Any mole that starts to itch, bleed, crust, burn, hurt, or ulcerate or demonstrate a change or evolution should be examined promptly by a board certified dermatologist         Srinivas Ramos and Plan:  Based on a thorough discussion of this condition and the management approach to it (including a comprehensive discussion of the known risks, side effects and potential benefits of treatment), the patient (family) agrees to implement the following specific plan:  When outside we recommend using a wide brim hat, sunglasses, long sleeve and pants, sunscreen with SPF 32+ with reapplication every 2 hours, or SPF specific clothing       What is a lentigo? A lentigo is a pigmented flat or slightly raised lesion with a clearly defined edge  Unlike an ephelis (freckle), it does not fade in the winter months  There are several kinds of lentigo  The name lentigo originally referred to its appearance resembling a small lentil  The plural of lentigo is lentigines, although “lentigos” is also in common use  Who gets lentigines? Lentigines can affect males and females of all ages and races  Solar lentigines are especially prevalent in fair skinned adults  Lentigines associated with syndromes are present at birth or arise during childhood  What causes lentigines? Common forms of lentigo are due to exposure to ultraviolet radiation:  Sun damage including sunburn   Indoor tanning   Phototherapy, especially photochemotherapy (PUVA)    Ionizing radiation, eg radiation therapy, can also cause lentigines  Several familial syndromes associated with widespread lentigines originate from mutations in [place]-MAP kinase, mTOR signaling and PTEN pathways      What is the treatment for lentigines? Most lentigines are left alone  Attempts to lighten them may not be successful  The following approaches are used:  SPF 50+ broad-spectrum sunscreen   Hydroquinone bleaching cream   Alpha hydroxy acids   Vitamin C   Retinoids   Azelaic acid   Chemical peels  Individual lesions can be permanently removed using:  Cryotherapy   Intense pulsed light   Pigment lasers    How can lentigines be prevented? Lentigines associated with exposure ultraviolet radiation can be prevented by very careful sun protection  Clothing is more successful at preventing new lentigines than are sunscreens  What is the outlook for lentigines? Lentigines usually persist  They may increase in number with age and sun exposure  Some in sun-protected sites may fade and disappear  LAI ANGIOMAS    Assessment and Plan:  Based on a thorough discussion of this condition and the management approach to it (including a comprehensive discussion of the known risks, side effects and potential benefits of treatment), the patient (family) agrees to implement the following specific plan:  Monitor for changes  Benign, reassured      Assessment and Plan:    Cherry angioma, also known as [place] spots, are benign vascular skin lesions  A "cherry angioma" is a firm red, blue or purple papule, 0 1-1 cm in diameter  When thrombosed, they can appear black in colour until evaluated with a dermatoscope when the red or purple colour is more easily seen  Cherry angioma may develop on any part of the body but most often appear on the scalp, face, lips and trunk  An angioma is due to proliferating endothelial cells; these are the cells that line the inside of a blood vessel  Angiomas can arise in early life or later in life; the most common type of angioma is a cherry angioma  Cherry angiomas are very common in males and females of any age or race  They are more noticeable in white skin than in skin of colour   They markedly increase in number from about the age of 36  There may be a family history of similar lesions  Eruptive cherry angiomas have been rarely reported to be associated with internal malignancy  The cause of angiomas is unknown  Genetic analysis of cherry angiomas has shown that they frequently carry specific somatic missense mutations in the GNAQ and GNA11 (Q209H) genes, which are involved in other vascular and melanocytic proliferations  SEBORRHEIC KERATOSIS; NON-INFLAMED     Assessment and Plan:  Based on a thorough discussion of this condition and the management approach to it (including a comprehensive discussion of the known risks, side effects and potential benefits of treatment), the patient (family) agrees to implement the following specific plan:  Monitor for changes  Benign, reassured      Seborrheic Keratosis  A seborrheic keratosis is a harmless warty spot that appears during adult life as a common sign of skin aging  Seborrheic keratoses can arise on any area of skin, covered or uncovered, with the usual exception of the palms and soles  They do not arise from mucous membranes  Seborrheic keratoses can have highly variable appearance  Seborrheic keratoses are extremely common  It has been estimated that over 90% of adults over the age of 61 years have one or more of them  They occur in males and females of all races, typically beginning to erupt in the 35s or 45s  They are uncommon under the age of 21 years  The precise cause of seborrhoeic keratoses is not known  Seborrhoeic keratoses are considered degenerative in nature  As time goes by, seborrheic keratoses tend to become more numerous  Some people inherit a tendency to develop a very large number of them; some people may have hundreds of them  There is no easy way to remove multiple lesions on a single occasion    Unless a specific lesion is "inflamed" and is causing pain or stinging/burning or is bleeding, most insurance companies do not authorize treatment  MILIUM     Assessment and Plan:  Based on a thorough discussion of this condition and the management approach to it (including a comprehensive discussion of the known risks, side effects and potential benefits of treatment), the patient (family) agrees to implement the following specific plan:  Reassured benign    Assessment and Plan  A milium is a small cyst containing keratin (the skin protein); they are usually multiple and are then known as milia  These harmless cysts present as tiny pearly-white bumps just under the surface of the skin  Milia are common in all ages and both sexes  They most often arise on the face and are particularly prominent on the eyelids and cheeks, but they may occur elsewhere  There are various kinds of milia   milia: Affect 40-50% of  babies, few to numerous lesions, often seen on the nose, but may also arise inside the mouth on the mucosa (Shaunna pearls) or palate (Estevan nodules) or more widely on the scalp, face and upper trunk, Heal spontaneously within a few weeks of birth  Primary milia in children and adults: found around eyelids, cheeks, forehead and genitalia, in young children, a row of milia may appear along the nasal crease, may clear in a few weeks or persist for months or longer  Juvenile milia: associated with Rombo syndrome, basal cell naevus syndrome, Intan-Wljfa-Rjunlcwx syndrome, pachyonychia congenita, Tolentino syndrome and other genetic disorders, may be congenital (present at birth) or appear later in life  Milia en plaque: multiple milia appear on within an inflamed plaque up to several centimeters in diameter, usually found on an eyelid, behind the ear, on a cheek or jaw  3  Affect children and adults, especially middle-aged women  4  Sometimes associated with another skin disease including pseudoxanthoma elasticum, discoid lupus erythematosus, lichen planus    Multiple eruptive milia: crops of numerous milia appear over a few weeks to months, lesions may be asymptomatic or itchy, most often affect the face, upper arms and upper trunk  Traumatic milia: occur at the site of injury as skin heals, arise from eccrine sweat ducts, examples include thermal burns, dermabrasion, blistering rashes such as bullous pemphigoid, often seen on the back of hands and fingers in porphyria cutanea tarda, a milia-like calcified nodule may develop after  heel stick blood test    Milia associated with drugs: may rarely follow the use of topical medication, such as phenols, hydroquinone, 5-fluorouracil cream, and a corticosteroid  Milia have a characteristic appearance  However, on occasion, a skin biopsy may be performed  This shows a small epidermoid cyst coming from a vellus hair follicle  Milia should be distinguished from other types of cyst, comedones, xanthelasma and syringomas  Colloid milia are solis coloured bumps on cheeks and temples associated with excessive exposure to sunlight  They should also be distinguished from milia-like cysts noted on dermoscopy in seborrhoeic keratoses, papillomatous moles and some basal cell carcinomas  Milia do not need to be treated unless they are a cause for concern for the patient  They often clear up by themselves within a few months  Where possible, further trauma should be minimised to reduce the development of new lesions  The lesion may be de-roofed using a sterile needle or blade and the contents squeezed or pricked out  They may be destroyed using diathermy and curettage, or cryotherapy  For widespread lesions, topical retinoids may be helpful  Chemical peels, dermabrasion and laser ablation have been reported to be effective when used for very extensive milia  Milia en plaque may improve with minocycline (a tetracycline antibiotic)      EPIDERMAL INCLUSION CYST    Assessment and Plan:  Based on a thorough discussion of this condition and the management approach to it (including a comprehensive discussion of the known risks, side effects and potential benefits of treatment), the patient (family) agrees to implement the following specific plan:  Reassured benign    What are epidermal inclusion cysts? Epidermal inclusion cysts are the most common, benign cutaneous cysts  There are many different names for epidermal inclusion cysts, including epidermoid cyst, epidermal cyst, infundibular cyst, inclusion cyst, and keratin cyst  These cysts can occur anywhere on the body and typically present as nodules directly underneath the skin  There is often a visible pore or opening in the center  The cysts are freely moveable and can range from a few millimeters to several centimeters in diameter  The center of epidermoid cysts almost always contains keratin, which has a cheesy appearance, and not sebum  They also do not originate from sebaceous glands  Therefore, epidermal inclusion cysts are not the same as sebaceous cysts  Cysts may remain stable or progressively enlarge over time  There are no reliable predictive factors to tell if an epidermal inclusion cyst will enlarge, become inflamed, or remain quiescent  Infected cysts tend to become larger, turn red, and are more noticeable to the patient  There may be accompanying pain and discomfort  What causes epidermal inclusion cysts? Epidermal inclusion cysts often appear out of the blue and are not contagious  They are due to a proliferation of epidermal cells within the dermis and are more common in men than women  They occur more frequently in patients in their 20s to 45s  Epidermal inclusion cysts by themselves are usually not inherited, but they can be hereditary in rare syndromes such as Tolentino syndrome, nodular elastosis with cysts and comedones (Favre-Racouchot syndrome), and basal cell nevus syndrome (Gorlin syndrome)  Elderly patients with chronic sun-damaged skin areas have a higher likelihood of developing epidermoid cysts   They often occur in areas where hair follicles have been inflamed or repeatedly irritated are more frequent in patients with acne vulgaris  In the  period, they are called milia  Patients on BRAF inhibitors such as imiquimod and cyclosporine have a higher incidence of epidermoid cysts of the face  How do we diagnose an epidermal inclusion cyst?  Epidermoid inclusion cysts are often diagnosed by history and physical exam  There is usually no need for biopsy prior to removal   Radiographic and laboratory exams, such as ultrasound studies, are unnecessary and not typically ordered unless the practitioner suspects a genetic condition  What is the treatment for an epidermal inclusion cyst?  Inflamed, uninfected epidermal inclusion cysts rarely resolve spontaneously without therapy or surgical intervention  Treatment is not emergent unless desired by the patient  Definitive treatment is via surgical excision with walls intact  This method will prevent recurrence  This is best done when the cyst is not inflamed, to decrease the probability of rupture during surgery  A local anesthetic will be injected around the cyst  A small incision is made in the skin overlying the cyst, and contents are expressed  The incision is repaired with sutures    Another option is to use a 4mm punch biopsy with cyst extraction through the defect  Incision and drainage is often needed if the cyst is infected or inflamed  If there is surrounding cellulitis, oral antibiotic therapy may be necessary  The common agents used target methicillin sensitive Staphylococcal aureus and methicillin resistant S aureus in areas of high prevalence       NOTALGIA PARESTHETICA    Assessment and Plan:  Based on a thorough discussion of this condition and the management approach to it (including a comprehensive discussion of the known risks, side effects and potential benefits of treatment), the patient (family) agrees to implement the following specific plan:  Can see chiropractor for adjustments  Can use steroid cream as treatment  Assessment and Plan:  Notalgia paresthetica is a condition where itch and changed sensation arise in the areas of skin on the medial aspect of the shoulder blade on either side of the back  Notalgia means pain in the back, and paraesthetica refers to burning pain, tingling or itch  It is also called thoracic cutaneous nerve entrapment syndrome  The nerves which supply sensation to the upper back emerge from the spinal cord (2nd to 6th thoracic segments) and run a long course up through the thick muscles of the back  They make a right-angled turn before reaching the skin  The nerves appear to be vulnerable to compression or traction  Partial compression or injury leads to the symptoms  Initial injury to the nerves may include:  Back injury  Herniated or "slipped" disc  Herpes zoster (shingles)  Sunburn  Myelopathy (muscular disease)  Small fiber neuropathy    Notalgia paresthetica often starts after a period of intense exercise leading to muscular stiffness, or a period of inactivity  Sometimes, a specific injury may be recalled  It is characterized by intense itch on the medial border of one scapula or both, ie, between the shoulder blades  This itch can be intermittent or continuous  It is unrelieved by scratching, although the scratching and rubbing may be pleasurable  The affected area may spread to both shoulder blades and more widely over the back and shoulders  In many patients, there are no visible signs  Visible changes often arise from rubbing and scratching the affected area  These include:  Scratch marks  Hyperpigmentation (brown marks)  Hypopigmentation (white marks)  Lichen simplex (a type of eczema)  Scarring    There may be changed sensation in the affected area of skin in response to light touch, sharp objects, cold, and heat   There may be reduced or absent sweating in the affected area     Radiology such as X-ray, CT scan or MRI may demonstrate a degenerative vertebra or prolapsed disc in the area that corresponds to the nerve supply to the affected skin  In many cases, no abnormality is revealed  Treatment is not always necessary, and it is not always successful  Typical management may include the following:  Cooling lotions or creams as required (camphor and menthol)  Topical steroids to treat associated lichen simplex  Capsaicin cream - this depletes nerve endings of their chemical transmitters but requires frequent reapplication and causes unpleasant local side effects  Local anesthetic creams may provide temporary relief of symptoms  Amitriptyline or other oral tricyclic medications at night to help sleep and counteract neuropathic symptoms  Gabapentin, pregabalin or other anticonvulsants can relieve unpleasant burning or tingling sensations  Transcutaneous electrical nerve stimulation (TENS)  Surgical decompression of vertebral nerve impingement    Physical therapy with repetitive exercises and stretches for the upper back has been reported to be effective  While sitting, cross arms and bend forwards to stretch upper back  Arms at sides, raise shoulders and rotate them forwards and backwards  Arms straight, rotate forwards 360 degrees and backwards 360 degrees  Rotate upper body left and right until stretch is felt and hold  Massage the muscles besides the spine in the affected area      ACTINIC KERATOSIS    Assessment and Plan:  Based on a thorough discussion of this condition and the management approach to it (including a comprehensive discussion of the known risks, side effects and potential benefits of treatment), the patient (family) agrees to implement the following specific plan:  Treatment of liquid nitrogen applied to the vertex at the visit today  Follow up in 4 months  Can try topical chemotherapy in the fall   Usually 2 times a day for 5 days worth of treatment  Photo-aging and actinic damage of skin is common on sites repeatedly exposed to the sun, especially the backs of the hands and the face, most often affecting the ears, nose, cheeks, upper lip, vermilion of the lower lip, temples, forehead and balding scalp  In severely chronically sun-exposed individuals, this condition may also be found on the upper trunk, upper and lower limbs, and dorsum of feet  Photo-aging induces cutaneous changes that vary among individuals, reflecting inherent differences in vulnerability to sun exposure and repair capacity  We discussed further steps to minimize or avoid UV exposure:    Be aware of daily UV index levels  [place] [facility] [place] on the [address]  Avoid outdoor activities during the middle of the day  Wear sun-protective clothing (e g , UPF-rated, broad-brimmed hats, long sleeves, and trousers or skirts)  Apply a high sun protection factor (60+) broad-spectrum sunscreen moisturizer at least three times a day to affected areas, year-round  I recommended Neutrogena Daily Defense or CeraVe AM or Aveeno  Do not smoke, and where possible, avoid exposure to pollutants  Get plenty of exercise -- active people appear younger than inactive people  Eat fruit and vegetables daily  Many oral supplements with antioxidant and anti-inflammatory properties have been advocated to mitigate skin aging and to improve skin health  These include carotenoids; polyphenols; chlorophyll; aloe vera; vitamins B, C, and E; red ginseng; squalene; and omega-3 fatty acids  Their role in combatting skin aging is unclear

## 2023-03-16 NOTE — PROGRESS NOTES
[facility] Note     Patient Name: Steffi Cr  Encounter Date: 3/16/23     Have you been cared for by a [facility] in the last 3 years and, if so, which description applies to you? NO  I am considered a "new" patient and must complete all patient intake questions  I am FEMALE/of child-bearing potential     REVIEW OF SYSTEMS:  Have you recently had or currently have any of the following? · Recent fever or chills? No  · Any non-healing wound? No  · Are you pregnant or planning to become pregnant? No  · Are you currently or planning to be nursing or breast feeding? No   PAST MEDICAL HISTORY:  Have you personally ever had or currently have any of the following? If "YES," then please provide more detail  · Skin cancer (such as Melanoma, Basal Cell Carcinoma, Squamous Cell Carcinoma? YES, BCC SCC  · Tuberculosis, HIV/AIDS, Hepatitis B or C: No  · Systemic Immunosuppression such as Diabetes, Biologic or Immunotherapy, Chemotherapy, Organ Transplantation, Bone Marrow Transplantation No  · Radiation Treatment No   FAMILY HISTORY:  Any "first degree relatives" (parent, brother, sister, or child) with the following? • Skin Cancer, Pancreatic or Other Cancer? YES, father Hodgkin's Lymphoma   PATIENT EXPERIENCE:    • Do you want the Dermatologist to perform a COMPLETE skin exam today including a clinical examination under the "bra and underwear" areas? Yes  • If necessary, do we have your permission to call and leave a detailed message on your Preferred Phone number that includes your specific medical information?   Yes      No Known Allergies   Current Outpatient Medications:   •  alendronate (FOSAMAX) 70 mg tablet, , Disp: , Rfl:   •  aspirin (ECOTRIN LOW STRENGTH) 81 mg EC tablet, Take 81 mg by mouth every evening 3 x per week, Disp: , Rfl:   •  Chelated Zinc 50 MG TABS, 50 mg, Disp: , Rfl:   •  doxycycline (PERIOSTAT) 20 MG tablet, Take 20 mg by mouth daily, Disp: , Rfl:   • ezetimibe-simvastatin (VYTORIN) 10-20 mg per tablet, Take 1 tablet by mouth daily at bedtime, Disp: , Rfl:   •  famotidine (PEPCID) 20 mg tablet, Take 20 mg by mouth daily, Disp: , Rfl:   •  Fluocinolone Acetonide Scalp 0 01 % OIL, Apply topically 2 (two) times a week Apply to scalp, leave over night, Disp: 118 mL, Rfl: 2  •  Multiple Vitamins-Minerals (CENTRUM SILVER 50+WOMEN PO), Take by mouth daily with breakfast, Disp: , Rfl:   •  ketoconazole (NIZORAL) 2 % shampoo, Apply 1 application topically daily for 30 doses, Disp: 120 mL, Rfl: 3          • Whom besides the patient is providing clinical information about today's encounter?   o NO ADDITIONAL HISTORIAN (patient alone provided history)   o Patient here for routine skin cancer screening  History of BCC, SCC  Some concern of scabs on the vertex  Took spironolactone for years  Physical Exam and Assessment/Plan by Diagnosis:    HISTORY OF BASAL CELL CARCINOMA    Physical Exam:  • Anatomic Location Affected:  Right hip  • Morphological Description of scar:  Well healed  • Suspected Recurrence: No  • Pertinent Positives:  • Pertinent Negatives: Additional History of Present Condition:  History of basal cell carcinoma with no sign of recurrence    Assessment and Plan:  Based on a thorough discussion of this condition and the management approach to it (including a comprehensive discussion of the known risks, side effects and potential benefits of treatment), the patient (family) agrees to implement the following specific plan:  • When outside we recommend using a wide brim hat, sunglasses, long sleeve and pants, sunscreen with SPF 60+ with reapplication every 2 hours, or SPF specific clothing     How can basal cell carcinoma be prevented? The most important way to prevent BCC is to avoid sunburn  This is especially important in childhood and early life   Fair skinned individuals and those with a personal or family history of BCC should protect their skin from sun exposure daily, year-round and lifelong  • Stay indoors or under the shade in the middle of the day   • Wear covering clothing   • Apply high protection factor SPF50+ broad-spectrum sunscreens generously to exposed skin if outdoors   • Avoid indoor tanning (sun beds, solaria)  • Oral nicotinamide (vitamin B3) in a dose of 500 mg twice daily may reduce the number and severity of BCCs  What is the outlook for basal cell carcinoma? Most BCCs are cured by treatment  Cure is most likely if treatment is undertaken when the lesion is small  About 50% of people with BCC develop a second one within 3 years of the first  They are also at increased risk of other skin cancers, especially melanoma  Regular self-skin examinations and long-term annual skin checks by an experienced health professional are recommended  HISTORY OF SQUAMOUS CELL CARCINOMA     Physical Exam:  • Anatomic Location Affected:  Right upper arm  • Morphological Description of Scar:  Well healed  • Suspected Recurrence: no  • Regional adenopathy: no    Additional History of Present Condition:  History of squamous cell carcinoma    Assessment and Plan:  Based on a thorough discussion of this condition and the management approach to it (including a comprehensive discussion of the known risks, side effects and potential benefits of treatment), the patient (family) agrees to implement the following specific plan:  • When outside we recommend using a wide brim hat, sunglasses, long sleeve and pants, sunscreen with SPF 79+ with reapplication every 2 hours, or SPF specific clothing     How can SCC be prevented? The most important way to prevent SCC is to avoid sunburn  This is especially important in childhood and early life  Fair skinned individuals and those with a personal or family history of BCC should protect their skin from sun exposure daily, year-round and lifelong    • Stay indoors or under the shade in the middle of the day   • Wear covering clothing   • Apply high protection factor SPF50+ broad-spectrum sunscreens generously to exposed skin if outdoors   • Avoid indoor tanning (sun beds, solaria)      What is the outlook for SCC? Most SCC are cured by treatment  Cure is most likely if treatment is undertaken when the lesion is small  A small percent of SCC's can spread to lymph  nodes and long term monitoring is indicated  They are also at increased risk of other skin cancers, especially melanoma  Regular self-skin examinations and long-term annual skin checks by an experienced health professional are recommended  MELANOCYTIC NEVI ("Moles")    Physical Exam:  • Anatomic Location Affected:   Mostly on sun-exposed areas of the trunk and extremities  • Morphological Description:  Scattered, 1-4mm round to ovoid, symmetrical-appearing, even bordered, skin colored to dark brown macules/papules, mostly in sun-exposed areas  • Pertinent Positives:  • Pertinent Negatives: Additional History of Present Condition:      Assessment and Plan:  Based on a thorough discussion of this condition and the management approach to it (including a comprehensive discussion of the known risks, side effects and potential benefits of treatment), the patient (family) agrees to implement the following specific plan:  • When outside we recommend using a wide brim hat, sunglasses, long sleeve and pants, sunscreen with SPF 04+ with reapplication every 2 hours, or SPF specific clothing   • Benign, reassured  • Every 8 month skin check     Melanocytic Nevi  Melanocytic nevi ("moles") are tan or brown, raised or flat areas of the skin which have an increased number of melanocytes  Melanocytes are the cells in our body which make pigment and account for skin color  Some moles are present at birth (I e , "congenital nevi"), while others come up later in life (i e , "acquired nevi")  The sun can stimulate the body to make more moles    Sunburns are not the only thing that triggers more moles  Chronic sun exposure can do it too  Clinically distinguishing a healthy mole from melanoma may be difficult, even for experienced dermatologists  The "ABCDE's" of moles have been suggested as a means of helping to alert a person to a suspicious mole and the possible increased risk of melanoma  The suggestions for raising alert are as follows:    Asymmetry: Healthy moles tend to be symmetric, while melanomas are often asymmetric  Asymmetry means if you draw a line through the mole, the two halves do not match in color, size, shape, or surface texture  Asymmetry can be a result of rapid enlargement of a mole, the development of a raised area on a previously flat lesion, scaling, ulceration, bleeding or scabbing within the mole  Any mole that starts to demonstrate "asymmetry" should be examined promptly by a board certified dermatologist      Border: Healthy moles tend to have discrete, even borders  The border of a melanoma often blends into the normal skin and does not sharply delineate the mole from normal skin  Any mole that starts to demonstrate "uneven borders" should be examined promptly by a board certified dermatologist      Color: Healthy moles tend to be one color throughout  Melanomas tend to be made up of different colors ranging from dark black, blue, white, or red  Any mole that demonstrates a color change should be examined promptly by a board certified dermatologist      Diameter: Healthy moles tend to be smaller than 0 6 cm in size; an exception are "congenital nevi" that can be larger  Melanomas tend to grow and can often be greater than 0 6 cm (1/4 of an inch, or the size of a pencil eraser)  This is only a guideline, and many normal moles may be larger than 0 6 cm without being unhealthy    Any mole that starts to change in size (small to bigger or bigger to smaller) should be examined promptly by a board certified dermatologist      Evolving: Healthy moles tend to "stay the same "  Melanomas may often show signs of change or evolution such as a change in size, shape, color, or elevation  Any mole that starts to itch, bleed, crust, burn, hurt, or ulcerate or demonstrate a change or evolution should be examined promptly by a board certified dermatologist         LENTIGO    Physical Exam:  • Anatomic Location Affected:  Mostly sun exposed  • Morphological Description:  Light brown macules  • Pertinent Positives:  • Pertinent Negatives: Additional History of Present Condition:  Lentigo on the right shin previously biopsied by Dr Galina Cuevas office  Assessment and Plan:  Based on a thorough discussion of this condition and the management approach to it (including a comprehensive discussion of the known risks, side effects and potential benefits of treatment), the patient (family) agrees to implement the following specific plan:  • When outside we recommend using a wide brim hat, sunglasses, long sleeve and pants, sunscreen with SPF 89+ with reapplication every 2 hours, or SPF specific clothing       What is a lentigo? A lentigo is a pigmented flat or slightly raised lesion with a clearly defined edge  Unlike an ephelis (freckle), it does not fade in the winter months  There are several kinds of lentigo  The name lentigo originally referred to its appearance resembling a small lentil  The plural of lentigo is lentigines, although “lentigos” is also in common use  Who gets lentigines? Lentigines can affect males and females of all ages and races  Solar lentigines are especially prevalent in fair skinned adults  Lentigines associated with syndromes are present at birth or arise during childhood  What causes lentigines? Common forms of lentigo are due to exposure to ultraviolet radiation:  • Sun damage including sunburn   • Indoor tanning   • Phototherapy, especially photochemotherapy (PUVA)    Ionizing radiation, eg radiation therapy, can also cause lentigines  Several familial syndromes associated with widespread lentigines originate from mutations in [place]-MAP kinase, mTOR signaling and PTEN pathways  What is the treatment for lentigines? Most lentigines are left alone  Attempts to lighten them may not be successful  The following approaches are used:  • SPF 50+ broad-spectrum sunscreen   • Hydroquinone bleaching cream   • Alpha hydroxy acids   • Vitamin C   • Retinoids   • Azelaic acid   • Chemical peels  Individual lesions can be permanently removed using:  • Cryotherapy   • Intense pulsed light   • Pigment lasers    How can lentigines be prevented? Lentigines associated with exposure ultraviolet radiation can be prevented by very careful sun protection  Clothing is more successful at preventing new lentigines than are sunscreens  What is the outlook for lentigines? Lentigines usually persist  They may increase in number with age and sun exposure  Some in sun-protected sites may fade and disappear  LAI ANGIOMAS    Physical Exam:  • Anatomic Location Affected:  trunk  • Morphological Description:  Scattered cherry red, 1-4 mm papules  • Pertinent Positives:  • Pertinent Negatives: Additional History of Present Condition:      Assessment and Plan:  Based on a thorough discussion of this condition and the management approach to it (including a comprehensive discussion of the known risks, side effects and potential benefits of treatment), the patient (family) agrees to implement the following specific plan:  • Monitor for changes  • Benign, reassured  •     Assessment and Plan:    Cherry angioma, also known as [place] spots, are benign vascular skin lesions  A "cherry angioma" is a firm red, blue or purple papule, 0 1-1 cm in diameter  When thrombosed, they can appear black in colour until evaluated with a dermatoscope when the red or purple colour is more easily seen   Cherry angioma may develop on any part of the body but most often appear on the scalp, face, lips and trunk  An angioma is due to proliferating endothelial cells; these are the cells that line the inside of a blood vessel  Angiomas can arise in early life or later in life; the most common type of angioma is a cherry angioma  Cherry angiomas are very common in males and females of any age or race  They are more noticeable in white skin than in skin of colour  They markedly increase in number from about the age of 36  There may be a family history of similar lesions  Eruptive cherry angiomas have been rarely reported to be associated with internal malignancy  The cause of angiomas is unknown  Genetic analysis of cherry angiomas has shown that they frequently carry specific somatic missense mutations in the GNAQ and GNA11 (Q209H) genes, which are involved in other vascular and melanocytic proliferations  SEBORRHEIC KERATOSIS; NON-INFLAMED    Physical Exam:  • Anatomic Location Affected:  trunk  • Morphological Description:  Flat and raised, waxy, smooth to warty textured, yellow to brownish-grey to dark brown to blackish, discrete, "stuck-on" appearing papules  • Pertinent Positives:  • Pertinent Negatives: Additional History of Present Condition:      Assessment and Plan:  Based on a thorough discussion of this condition and the management approach to it (including a comprehensive discussion of the known risks, side effects and potential benefits of treatment), the patient (family) agrees to implement the following specific plan:  • Monitor for changes  • Benign, reassured  •     Seborrheic Keratosis  A seborrheic keratosis is a harmless warty spot that appears during adult life as a common sign of skin aging  Seborrheic keratoses can arise on any area of skin, covered or uncovered, with the usual exception of the palms and soles  They do not arise from mucous membranes  Seborrheic keratoses can have highly variable appearance  Seborrheic keratoses are extremely common   It has been estimated that over 90% of adults over the age of 61 years have one or more of them  They occur in males and females of all races, typically beginning to erupt in the 35s or 45s  They are uncommon under the age of 21 years  The precise cause of seborrhoeic keratoses is not known  Seborrhoeic keratoses are considered degenerative in nature  As time goes by, seborrheic keratoses tend to become more numerous  Some people inherit a tendency to develop a very large number of them; some people may have hundreds of them  There is no easy way to remove multiple lesions on a single occasion  Unless a specific lesion is "inflamed" and is causing pain or stinging/burning or is bleeding, most insurance companies do not authorize treatment  MILIUM     Physical Exam:  • Anatomic Location Affected:  face  • Morphological Description:  Yellowish papules  • Pertinent Positives:  • Pertinent Negatives: Additional History of Present Condition:  At skin exam    Assessment and Plan:  Based on a thorough discussion of this condition and the management approach to it (including a comprehensive discussion of the known risks, side effects and potential benefits of treatment), the patient (family) agrees to implement the following specific plan:  • Reassured benign    Assessment and Plan  A milium is a small cyst containing keratin (the skin protein); they are usually multiple and are then known as milia  These harmless cysts present as tiny pearly-white bumps just under the surface of the skin  Milia are common in all ages and both sexes  They most often arise on the face and are particularly prominent on the eyelids and cheeks, but they may occur elsewhere  There are various kinds of milia    •  milia: Affect 40-50% of  babies, few to numerous lesions, often seen on the nose, but may also arise inside the mouth on the mucosa (Shaunna pearls) or palate (Estevan nodules) or more widely on the scalp, face and upper trunk, Heal spontaneously within a few weeks of birth  • Primary milia in children and adults: found around eyelids, cheeks, forehead and genitalia, in young children, a row of milia may appear along the nasal crease, may clear in a few weeks or persist for months or longer  • Juvenile milia: associated with Rombo syndrome, basal cell naevus syndrome, Kyqap-Dkqax-Esnfwddt syndrome, pachyonychia congenita, Tolentino syndrome and other genetic disorders, may be congenital (present at birth) or appear later in life  • Milia en plaque: multiple milia appear on within an inflamed plaque up to several centimeters in diameter, usually found on an eyelid, behind the ear, on a cheek or jaw  3  Affect children and adults, especially middle-aged women  4  Sometimes associated with another skin disease including pseudoxanthoma elasticum, discoid lupus erythematosus, lichen planus  • Multiple eruptive milia: crops of numerous milia appear over a few weeks to months, lesions may be asymptomatic or itchy, most often affect the face, upper arms and upper trunk  • Traumatic milia: occur at the site of injury as skin heals, arise from eccrine sweat ducts, examples include thermal burns, dermabrasion, blistering rashes such as bullous pemphigoid, often seen on the back of hands and fingers in porphyria cutanea tarda, a milia-like calcified nodule may develop after  heel stick blood test    • Milia associated with drugs: may rarely follow the use of topical medication, such as phenols, hydroquinone, 5-fluorouracil cream, and a corticosteroid  Milia have a characteristic appearance  However, on occasion, a skin biopsy may be performed  This shows a small epidermoid cyst coming from a vellus hair follicle  Milia should be distinguished from other types of cyst, comedones, xanthelasma and syringomas  Colloid milia are solis coloured bumps on cheeks and temples associated with excessive exposure to sunlight  They should also be distinguished from milia-like cysts noted on dermoscopy in seborrhoeic keratoses, papillomatous moles and some basal cell carcinomas  Milia do not need to be treated unless they are a cause for concern for the patient  They often clear up by themselves within a few months  Where possible, further trauma should be minimised to reduce the development of new lesions  • The lesion may be de-roofed using a sterile needle or blade and the contents squeezed or pricked out  • They may be destroyed using diathermy and curettage, or cryotherapy  • For widespread lesions, topical retinoids may be helpful  • Chemical peels, dermabrasion and laser ablation have been reported to be effective when used for very extensive milia  • Milia en plaque may improve with minocycline (a tetracycline antibiotic)  EPIDERMAL INCLUSION CYST    Physical Exam:  • Anatomic Location Affected:  Right cheek  • Morphological Description:  Translucent 5 mm papule  • Pertinent Positives:  • Pertinent Negatives: Additional History of Present Condition:  Liquid nitrogen treatment a few times    Assessment and Plan:  Based on a thorough discussion of this condition and the management approach to it (including a comprehensive discussion of the known risks, side effects and potential benefits of treatment), the patient (family) agrees to implement the following specific plan:  • Reassured benign    What are epidermal inclusion cysts? Epidermal inclusion cysts are the most common, benign cutaneous cysts  There are many different names for epidermal inclusion cysts, including epidermoid cyst, epidermal cyst, infundibular cyst, inclusion cyst, and keratin cyst  These cysts can occur anywhere on the body and typically present as nodules directly underneath the skin  There is often a visible pore or opening in the center  The cysts are freely moveable and can range from a few millimeters to several centimeters in diameter   The center of epidermoid cysts almost always contains keratin, which has a cheesy appearance, and not sebum  They also do not originate from sebaceous glands  Therefore, epidermal inclusion cysts are not the same as sebaceous cysts  Cysts may remain stable or progressively enlarge over time  There are no reliable predictive factors to tell if an epidermal inclusion cyst will enlarge, become inflamed, or remain quiescent  Infected cysts tend to become larger, turn red, and are more noticeable to the patient  There may be accompanying pain and discomfort  What causes epidermal inclusion cysts? Epidermal inclusion cysts often appear out of the blue and are not contagious  They are due to a proliferation of epidermal cells within the dermis and are more common in men than women  They occur more frequently in patients in their 20s to 45s  Epidermal inclusion cysts by themselves are usually not inherited, but they can be hereditary in rare syndromes such as Tolentino syndrome, nodular elastosis with cysts and comedones (Favre-Racouchot syndrome), and basal cell nevus syndrome (Gorlin syndrome)  Elderly patients with chronic sun-damaged skin areas have a higher likelihood of developing epidermoid cysts  They often occur in areas where hair follicles have been inflamed or repeatedly irritated are more frequent in patients with acne vulgaris  In the  period, they are called milia  Patients on BRAF inhibitors such as imiquimod and cyclosporine have a higher incidence of epidermoid cysts of the face  How do we diagnose an epidermal inclusion cyst?  Epidermoid inclusion cysts are often diagnosed by history and physical exam  There is usually no need for biopsy prior to removal   Radiographic and laboratory exams, such as ultrasound studies, are unnecessary and not typically ordered unless the practitioner suspects a genetic condition      What is the treatment for an epidermal inclusion cyst?  Inflamed, uninfected epidermal inclusion cysts rarely resolve spontaneously without therapy or surgical intervention  Treatment is not emergent unless desired by the patient  Definitive treatment is via surgical excision with walls intact  This method will prevent recurrence  This is best done when the cyst is not inflamed, to decrease the probability of rupture during surgery  - A local anesthetic will be injected around the cyst  - A small incision is made in the skin overlying the cyst, and contents are expressed  - The incision is repaired with sutures    Another option is to use a 4mm punch biopsy with cyst extraction through the defect  Incision and drainage is often needed if the cyst is infected or inflamed  If there is surrounding cellulitis, oral antibiotic therapy may be necessary  The common agents used target methicillin sensitive Staphylococcal aureus and methicillin resistant S aureus in areas of high prevalence  NOTALGIA PARESTHETICA    Physical Exam:  • Anatomic Location Affected:  Mid upper back  • Morphological Description:  Not visible  • Pertinent Positives:  • Pertinent Negatives: Additional History of Present Condition:  Itches on and off    Assessment and Plan:  Based on a thorough discussion of this condition and the management approach to it (including a comprehensive discussion of the known risks, side effects and potential benefits of treatment), the patient (family) agrees to implement the following specific plan:  • Can see chiropractor for adjustments  • Can use steroid cream as treatment  Assessment and Plan:  Notalgia paresthetica is a condition where itch and changed sensation arise in the areas of skin on the medial aspect of the shoulder blade on either side of the back  Notalgia means pain in the back, and paraesthetica refers to burning pain, tingling or itch  It is also called thoracic cutaneous nerve entrapment syndrome      The nerves which supply sensation to the upper back emerge from the spinal cord (2nd to 6th thoracic segments) and run a long course up through the thick muscles of the back  They make a right-angled turn before reaching the skin  The nerves appear to be vulnerable to compression or traction  Partial compression or injury leads to the symptoms  Initial injury to the nerves may include:  • Back injury  • Herniated or "slipped" disc  • Herpes zoster (shingles)  • Sunburn  • Myelopathy (muscular disease)  • Small fiber neuropathy    Notalgia paresthetica often starts after a period of intense exercise leading to muscular stiffness, or a period of inactivity  Sometimes, a specific injury may be recalled  It is characterized by intense itch on the medial border of one scapula or both, ie, between the shoulder blades  This itch can be intermittent or continuous  It is unrelieved by scratching, although the scratching and rubbing may be pleasurable  The affected area may spread to both shoulder blades and more widely over the back and shoulders  In many patients, there are no visible signs  Visible changes often arise from rubbing and scratching the affected area  These include:  • Scratch marks  • Hyperpigmentation (brown marks)  • Hypopigmentation (white marks)  • Lichen simplex (a type of eczema)  • Scarring    There may be changed sensation in the affected area of skin in response to light touch, sharp objects, cold, and heat  There may be reduced or absent sweating in the affected area  Radiology such as X-ray, CT scan or MRI may demonstrate a degenerative vertebra or prolapsed disc in the area that corresponds to the nerve supply to the affected skin  In many cases, no abnormality is revealed  Treatment is not always necessary, and it is not always successful   Typical management may include the following:  • Cooling lotions or creams as required (camphor and menthol)  • Topical steroids to treat associated lichen simplex  • Capsaicin cream - this depletes nerve endings of their chemical transmitters but requires frequent reapplication and causes unpleasant local side effects  • Local anesthetic creams may provide temporary relief of symptoms  • Amitriptyline or other oral tricyclic medications at night to help sleep and counteract neuropathic symptoms  • Gabapentin, pregabalin or other anticonvulsants can relieve unpleasant burning or tingling sensations  • Transcutaneous electrical nerve stimulation (TENS)  • Surgical decompression of vertebral nerve impingement    Physical therapy with repetitive exercises and stretches for the upper back has been reported to be effective  • While sitting, cross arms and bend forwards to stretch upper back  • Arms at sides, raise shoulders and rotate them forwards and backwards  • Arms straight, rotate forwards 360 degrees and backwards 360 degrees  • Rotate upper body left and right until stretch is felt and hold  • Massage the muscles besides the spine in the affected area      ACTINIC KERATOSIS    Physical Exam:  • Anatomic Location Affected:  scalp  • Morphological Description:  Mottled (hyper- and hypo-pigmented), slightly atrophic skin with overlying telangiectasia  • Pertinent Positives:  • Pertinent Negatives: Additional History of Present Condition:  History of AK's    Assessment and Plan:  Based on a thorough discussion of this condition and the management approach to it (including a comprehensive discussion of the known risks, side effects and potential benefits of treatment), the patient (family) agrees to implement the following specific plan:  • Treatment of liquid nitrogen applied to the vertex at the visit today  • Follow up in 4 months  • Can try topical chemotherapy in the fall  Usually 2 times a day for 5 days worth of treatment      Photo-aging and actinic damage of skin is common on sites repeatedly exposed to the sun, especially the backs of the hands and the face, most often affecting the ears, nose, cheeks, upper lip, vermilion of the lower lip, temples, forehead and balding scalp  In severely chronically sun-exposed individuals, this condition may also be found on the upper trunk, upper and lower limbs, and dorsum of feet  Photo-aging induces cutaneous changes that vary among individuals, reflecting inherent differences in vulnerability to sun exposure and repair capacity  We discussed further steps to minimize or avoid UV exposure:    • Be aware of daily UV index levels  In the [facility], this index is often reported on the [address]  • Avoid outdoor activities during the middle of the day  • Wear sun-protective clothing (e g , UPF-rated, broad-brimmed hats, long sleeves, and trousers or skirts)  • Apply a high sun protection factor (60+) broad-spectrum sunscreen moisturizer at least three times a day to affected areas, year-round  I recommended Neutrogena Daily Defense or CeraVe AM or Aveeno  • Do not smoke, and where possible, avoid exposure to pollutants  • Get plenty of exercise -- active people appear younger than inactive people  • Eat fruit and vegetables daily  • Many oral supplements with antioxidant and anti-inflammatory properties have been advocated to mitigate skin aging and to improve skin health  These include carotenoids; polyphenols; chlorophyll; aloe vera; vitamins B, C, and E; red ginseng; squalene; and omega-3 fatty acids  Their role in combatting skin aging is unclear  PROCEDURE:  DESTRUCTION OF PRE-MALIGNANT LESIONS  After a thorough discussion of treatment options and risk/benefits/alternatives (including but not limited to local pain, scarring, dyspigmentation, blistering, and possible superinfection), verbal and written consent were obtained and the aforementioned lesions were treated on with cryotherapy using liquid nitrogen x 1 cycle for 5-10 seconds      • TOTAL NUMBER of 5 pre-malignant lesions were treated today on the ANATOMIC LOCATION: vertex  The patient tolerated the procedure well, and after-care instructions were provided      Scribe Attestation    I,:  Amari Raymundo am acting as a scribe while in the presence of the attending physician :       I,:  Carter Morrison MD personally performed the services described in this documentation    as scribed in my presence :

## 2023-05-01 ENCOUNTER — EVALUATION (OUTPATIENT)
Dept: PHYSICAL THERAPY | Facility: CLINIC | Age: 78
End: 2023-05-01

## 2023-05-01 DIAGNOSIS — M54.50 CHRONIC LOW BACK PAIN, UNSPECIFIED BACK PAIN LATERALITY, UNSPECIFIED WHETHER SCIATICA PRESENT: ICD-10-CM

## 2023-05-01 DIAGNOSIS — M70.61 GREATER TROCHANTERIC BURSITIS OF RIGHT HIP: Primary | ICD-10-CM

## 2023-05-01 DIAGNOSIS — G89.29 CHRONIC LOW BACK PAIN, UNSPECIFIED BACK PAIN LATERALITY, UNSPECIFIED WHETHER SCIATICA PRESENT: ICD-10-CM

## 2023-05-01 NOTE — LETTER
May 1, 2023    [address]     Patient: Catherine Cornejo   YOB: 1945   Date of Visit: 2023     Encounter Diagnosis     ICD-10-CM    1  Greater trochanteric bursitis of right hip  M70 61       2  Chronic low back pain, unspecified back pain laterality, unspecified whether sciatica present  M54 50     G89 29           Dear Dr Rock Gutierrez:    Thank you for your recent referral of Catherine Cornejo  Please review the attached evaluation summary from Liz's recent visit  Please verify that you agree with the plan of care by signing the attached order  If you have any questions or concerns, please do not hesitate to call  I sincerely appreciate the opportunity to share in the care of one of your patients and hope to have another opportunity to work with you in the near future  Sincerely,    Osmar Stephenson, PT      Referring Provider:      I certify that I have read the below Plan of Care and certify the need for these services furnished under this plan of treatment while under my care  Krystle White DO  [address] [place] [id number]  Via Fax: [phone]          PT Evaluation     Today's date: 2023  Patient name: Catherine Cornejo  : 1945  MRN: [id number]  Referring provider: Minoo Lopez DO  Dx:   Encounter Diagnosis     ICD-10-CM    1  Greater trochanteric bursitis of right hip  M70 61       2  Chronic low back pain, unspecified back pain laterality, unspecified whether sciatica present  M54 50     G89 29           Start Time: 1400  Stop Time: 1442  Total time in clinic (min): 42 minutes    Assessment  Assessment details: Catherine Cornejo is a pleasant 76 y o  female who presents with acute low back and R hip pain with s/s of GTPS  The patient's greatest concerns are worry over not knowing what's wrong and fear of not being able to keep active        No further referral appears necessary at this time based upon examination results  Primary movement impairment diagnosis of weakness hips resulting in pathoanatomical symptoms of Chronic low back pain, unspecified back pain laterality, unspecified whether sciatica present  (primary encounter diagnosis) and limiting her ability to carry, exercise or recreation, lift, perform household chores, perform yard work, stand and walk  Impairments include:  1) weakness in hips  2) weakness in core  3) lumbar ROM    Discussed risks, benefits, and alternatives to treatment, and answered all patient questions to patient satisfaction  Impairments: abnormal muscle firing, abnormal muscle tone, abnormal or restricted ROM, abnormal movement, impaired physical strength, lacks appropriate home exercise program, pain with function and poor posture   Understanding of Dx/Px/POC: good   Prognosis: good    Goals  Impairment Goals 4-6 weeks  - Decrease pain to <3/10  - Improve lumbar AROM to >75% throughout  - Increase hip strength to 4+/5 throughout  - Increase core strength to be able to sit straight up without deviation    Functional Goals 6-8 weeks  - Return to Prior Level of Function  - Patient will be independent with HEP  - Patient will be able to walk without increased pain/compensation/difficulty  - Patient will be able to mow her lawn without increased pain/compensation/difficulty   - Patient will be able to pull weeds without increased pain/compensation/difficulty   - Patient will be able to go up and down the stairs with proper mechanics and no pain      Plan  Plan details: Prognosis above is given PT services 2x/week tapering to 1x/week over the next 2 months and home program adherence    Patient would benefit from: skilled physical therapy  Planned modality interventions: cryotherapy, TENS, thermotherapy: hydrocollator packs and unattended electrical stimulation  Planned therapy interventions: abdominal trunk stabilization, behavior modification, body mechanics training, breathing training, flexibility, functional ROM exercises, home exercise program, joint mobilization, manual therapy, massage, Olivera taping, muscle pump exercises, neuromuscular re-education, patient education, postural training, strengthening, stretching, therapeutic activities, therapeutic exercise, therapeutic training and balance  Frequency: 2x week  Duration in weeks: 8  Treatment plan discussed with: patient        Subjective Evaluation    History of Present Illness  Mechanism of injury: WORK/SCHOOL: retired  HOME LIFE: First floor set up with basement for laundry and TV room  Lives alone at home with cat  HOBBIES/EXERCISE: yard work  PLOF: none  HISTORY OF CURRENT INJURY:  Patient started having pain 4 weeks ago  She does not remember doing anything differently  She had leg and hip pain on the R side  She noticed pain in her low back with prolonged walking and standing  Patient denies numbness/tingling  She reports pain has not traveled into the leg for a few weeks now  She is very independent and wishes to continue being independent  PAIN LOCATION/DESCRIPTORS: Pain in R hip and in low back  Pain in lateral hip and in glute  She did have pain in lateral thigh and back of the knee  AGGRAVATING FACTORS:  Stairs, bending, pulling weeds, mowing the lawn, kneeling  EASES: rest with feet up  DAY PATTERN: no pattern  IMAGING: x-ray   Marked levoscoliosis  Severe multilevel degenerative changes in the lumbar  spine  SPECIAL QUESTIONS:    Roseanne Jaime denies a new onset of Bladder incontinence, Bowel dysfunction, Constant night pain, Tingling and Numbness  Hx of skin CA  PELVIC FLOOR: Do you have pelvic pain or chronic constipation? none  PATIENT GOALS: Patient wishes to be able to get rid of her aches and pains so she can function      Pain  Current pain ratin  At best pain ratin  At worst pain ratin  Location: low back and R hip  Quality: dull ache and discomfort  Progression: improved    Patient Goals  Patient goals for therapy: decreased pain, increased motion, increased strength, independence with ADLs/IADLs and return to sport/leisure activities          Objective     Active Range of Motion     Lumbar   Flexion: 85 degrees   Extension: 30 degrees  with pain  Left lateral flexion:  with pain Restriction level: moderate  Right lateral flexion:  WFL  Left rotation:  Restriction level: moderate  Right rotation:  Restriction level: minimal    Additional Active Range of Motion Details  Soreness in low back with ext and L lateral flexion    Strength/Myotome Testing     Left Hip   Planes of Motion   Flexion: 4-  Extension: 3+  Abduction: 4  External rotation: 4  Internal rotation: 4    Right Hip   Planes of Motion   Flexion: 3+ (pain in R hip)  Extension: 3+  Abduction: 3+  External rotation: 4  Internal rotation: 3+    Left Knee   Flexion: 4  Extension: 4    Right Knee   Flexion: 4  Extension: 4    Left Ankle/Foot   Dorsiflexion: 4    Right Ankle/Foot   Dorsiflexion: 4    Tests     Lumbar     Left   Negative crossed SLR, passive SLR and slump test      Right   Negative crossed SLR, passive SLR and slump test      Left Pelvic Girdle/Sacrum   Negative: thigh thrust      Right Pelvic Girdle/Sacrum   Negative: thigh thrust      Left Hip   Positive ZAK  Negative FADIR  Right Hip   Negative ZAK and FADIR       Additional Tests Details  PROM uriel hips WNL  TTP of R GT and hip ER    Lumbar PA hypomobile and pain-free              Diagnosis: R hip pain GTPS, LBP   Precautions:    Primary Goals: 1) weakness in hips  2) weakness in core  3) lumbar ROM   *asterisks by exercise = given for HEP   Manuals 5/1       IASTM/STM R glute and ITB                                        There Ex        Bike        3 way ball rolls        LTR        Piriformis S        Figure 4 S                                        Neuro Re-Ed        TA        Debby        Mult with amps        Mult press with MB        Clam        Bridge        Reverse clam Standing 3 way SLR                                         Re-evaluation              Ther Act                                         Modalities

## 2023-05-01 NOTE — PROGRESS NOTES
PT Evaluation     Today's date: 2023  Patient name: Richi Irwin  : 1945  MRN: [id number]  Referring provider: Dagmar Rob DO  Dx:   Encounter Diagnosis     ICD-10-CM    1  Greater trochanteric bursitis of right hip  M70 61       2  Chronic low back pain, unspecified back pain laterality, unspecified whether sciatica present  M54 50     G89 29           Start Time: 1400  Stop Time: 1442  Total time in clinic (min): 42 minutes    Assessment  Assessment details: Richi Irwin is a pleasant 76 y o  female who presents with acute low back and R hip pain with s/s of GTPS  The patient's greatest concerns are worry over not knowing what's wrong and fear of not being able to keep active  No further referral appears necessary at this time based upon examination results  Primary movement impairment diagnosis of weakness hips resulting in pathoanatomical symptoms of Chronic low back pain, unspecified back pain laterality, unspecified whether sciatica present  (primary encounter diagnosis) and limiting her ability to carry, exercise or recreation, lift, perform household chores, perform yard work, stand and walk  Impairments include:  1) weakness in hips  2) weakness in core  3) lumbar ROM    Discussed risks, benefits, and alternatives to treatment, and answered all patient questions to patient satisfaction    Impairments: abnormal muscle firing, abnormal muscle tone, abnormal or restricted ROM, abnormal movement, impaired physical strength, lacks appropriate home exercise program, pain with function and poor posture   Understanding of Dx/Px/POC: good   Prognosis: good    Goals  Impairment Goals 4-6 weeks  - Decrease pain to <3/10  - Improve lumbar AROM to >75% throughout  - Increase hip strength to 4+/5 throughout  - Increase core strength to be able to sit straight up without deviation    Functional Goals 6-8 weeks  - Return to Prior Level of Function  - Patient will be independent with HEP  - Patient will be able to walk without increased pain/compensation/difficulty  - Patient will be able to mow her lawn without increased pain/compensation/difficulty   - Patient will be able to pull weeds without increased pain/compensation/difficulty   - Patient will be able to go up and down the stairs with proper mechanics and no pain      Plan  Plan details: Prognosis above is given PT services 2x/week tapering to 1x/week over the next 2 months and home program adherence  Patient would benefit from: skilled physical therapy  Planned modality interventions: cryotherapy, TENS, thermotherapy: hydrocollator packs and unattended electrical stimulation  Planned therapy interventions: abdominal trunk stabilization, behavior modification, body mechanics training, breathing training, flexibility, functional ROM exercises, home exercise program, joint mobilization, manual therapy, massage, Olivera taping, muscle pump exercises, neuromuscular re-education, patient education, postural training, strengthening, stretching, therapeutic activities, therapeutic exercise, therapeutic training and balance  Frequency: 2x week  Duration in weeks: 8  Treatment plan discussed with: patient        Subjective Evaluation    History of Present Illness  Mechanism of injury: WORK/SCHOOL: retired  HOME LIFE: First floor set up with basement for laundry and TV room  Lives alone at home with cat  HOBBIES/EXERCISE: yard work  PLOF: none  HISTORY OF CURRENT INJURY:  Patient started having pain 4 weeks ago  She does not remember doing anything differently  She had leg and hip pain on the R side  She noticed pain in her low back with prolonged walking and standing  Patient denies numbness/tingling  She reports pain has not traveled into the leg for a few weeks now  She is very independent and wishes to continue being independent  PAIN LOCATION/DESCRIPTORS: Pain in R hip and in low back  Pain in lateral hip and in glute   She did have pain in lateral thigh and back of the knee  AGGRAVATING FACTORS:  Stairs, bending, pulling weeds, mowing the lawn, kneeling  EASES: rest with feet up  DAY PATTERN: no pattern  IMAGING: x-ray   Marked levoscoliosis  Severe multilevel degenerative changes in the lumbar  spine  SPECIAL QUESTIONS:    Maricruz Monteiro denies a new onset of Bladder incontinence, Bowel dysfunction, Constant night pain, Tingling and Numbness  Hx of skin CA  PELVIC FLOOR: Do you have pelvic pain or chronic constipation? none  PATIENT GOALS: Patient wishes to be able to get rid of her aches and pains so she can function      Pain  Current pain ratin  At best pain ratin  At worst pain ratin  Location: low back and R hip  Quality: dull ache and discomfort  Progression: improved    Patient Goals  Patient goals for therapy: decreased pain, increased motion, increased strength, independence with ADLs/IADLs and return to sport/leisure activities          Objective     Active Range of Motion     Lumbar   Flexion: 85 degrees   Extension: 30 degrees  with pain  Left lateral flexion:  with pain Restriction level: moderate  Right lateral flexion:  WFL  Left rotation:  Restriction level: moderate  Right rotation:  Restriction level: minimal    Additional Active Range of Motion Details  Soreness in low back with ext and L lateral flexion    Strength/Myotome Testing     Left Hip   Planes of Motion   Flexion: 4-  Extension: 3+  Abduction: 4  External rotation: 4  Internal rotation: 4    Right Hip   Planes of Motion   Flexion: 3+ (pain in R hip)  Extension: 3+  Abduction: 3+  External rotation: 4  Internal rotation: 3+    Left Knee   Flexion: 4  Extension: 4    Right Knee   Flexion: 4  Extension: 4    Left Ankle/Foot   Dorsiflexion: 4    Right Ankle/Foot   Dorsiflexion: 4    Tests     Lumbar     Left   Negative crossed SLR, passive SLR and slump test      Right   Negative crossed SLR, passive SLR and slump test      Left Pelvic Girdle/Sacrum   Negative: thigh thrust      Right Pelvic Girdle/Sacrum   Negative: thigh thrust      Left Hip   Positive ZAK  Negative FADIR  Right Hip   Negative ZAK and FADIR       Additional Tests Details  PROM uriel hips WNL  TTP of R GT and hip ER    Lumbar PA hypomobile and pain-free               Diagnosis: R hip pain GTPS, LBP   Precautions:    Primary Goals: 1) weakness in hips  2) weakness in core  3) lumbar ROM   *asterisks by exercise = given for HEP   Manuals 5/1       IASTM/STM R glute and ITB                                        There Ex        Bike        3 way ball rolls        LTR        Piriformis S        Figure 4 S                                        Neuro Re-Ed        TA        Jasongel        Mult with amps        Mult press with MB        Clam        Bridge        Reverse clam        Standing 3 way SLR                                         Re-evaluation              Ther Act                                         Modalities

## 2023-05-03 ENCOUNTER — OFFICE VISIT (OUTPATIENT)
Dept: PHYSICAL THERAPY | Facility: CLINIC | Age: 78
End: 2023-05-03

## 2023-05-03 DIAGNOSIS — G89.29 CHRONIC LOW BACK PAIN, UNSPECIFIED BACK PAIN LATERALITY, UNSPECIFIED WHETHER SCIATICA PRESENT: ICD-10-CM

## 2023-05-03 DIAGNOSIS — M54.50 CHRONIC LOW BACK PAIN, UNSPECIFIED BACK PAIN LATERALITY, UNSPECIFIED WHETHER SCIATICA PRESENT: ICD-10-CM

## 2023-05-03 DIAGNOSIS — M70.61 GREATER TROCHANTERIC BURSITIS OF RIGHT HIP: Primary | ICD-10-CM

## 2023-05-03 NOTE — PROGRESS NOTES
Daily Note     Today's date: 5/3/2023  Patient name: Alonso Carrillo  : 1945  MRN: [id number]  Referring provider: Marsha Hearn DO  Dx:   Encounter Diagnosis     ICD-10-CM    1  Greater trochanteric bursitis of right hip  M70 61       2  Chronic low back pain, unspecified back pain laterality, unspecified whether sciatica present  M54 50     G89 29           Start Time: 1357  Stop Time: 1443  Total time in clinic (min): 46 minutes    Subjective: Patient reports she is feeling okay today  She went up and down the stairs at least 5 times this morning and felt ok  Objective: See treatment diary below    Assessment: Patient tolerated gentle stretching and strengthening for low back and hips well  Printout provided today to maximize progress outside of PT  Patient tender to rolling of R glute/ITB but without lasting soreness  Plan: Continue per plan of care        Diagnosis: R hip pain GTPS, LBP   Precautions:    Primary Goals: 1) weakness in hips  2) weakness in core  3) lumbar ROM   *asterisks by exercise = given for HEP   Manuals  5/3      IASTM/STM R glute and ITB  IM, PT roller                                      There Ex        TM  5 min TM      3 way ball rolls  5 sec x 5 ea      LTR*  5 sec x 10      Piriformis S*  3x30 sec uriel      Figure 4 S*  3x30 sec uriel      FF in chair*        ITB S off of table  5x10 sec R                      Neuro Re-Ed        TA*  1 min hands pushing into thighs      Kegel*  1 min      Mult with amps        Mult press with MB  RMB 2x10 forward and OH      Clam  2x10 AROM      Bridge        Reverse clam        Standing 3 way SLR                                         Re-evaluation              Ther Act                                         Modalities

## 2023-05-10 ENCOUNTER — OFFICE VISIT (OUTPATIENT)
Dept: PHYSICAL THERAPY | Facility: CLINIC | Age: 78
End: 2023-05-10

## 2023-05-10 DIAGNOSIS — G89.29 CHRONIC LOW BACK PAIN, UNSPECIFIED BACK PAIN LATERALITY, UNSPECIFIED WHETHER SCIATICA PRESENT: ICD-10-CM

## 2023-05-10 DIAGNOSIS — M70.61 GREATER TROCHANTERIC BURSITIS OF RIGHT HIP: Primary | ICD-10-CM

## 2023-05-10 DIAGNOSIS — M54.50 CHRONIC LOW BACK PAIN, UNSPECIFIED BACK PAIN LATERALITY, UNSPECIFIED WHETHER SCIATICA PRESENT: ICD-10-CM

## 2023-05-10 NOTE — PROGRESS NOTES
Daily Note     Today's date: 5/10/2023  Patient name: Vahe Ng  : 1945  MRN: [id number]  Referring provider: Sheridan Samayoa DO  Dx:   Encounter Diagnosis     ICD-10-CM    1  Greater trochanteric bursitis of right hip  M70 61       2  Chronic low back pain, unspecified back pain laterality, unspecified whether sciatica present  M54 50     G89 29           Start Time: 1200  Stop Time: 1245  Total time in clinic (min): 45 minutes    Subjective: Patient admits to only doing the HEP once since last visit, as she was very active in the garden weeding, mowing the lawn, and doing other activities  She     Objective: See treatment diary below    Assessment: Reviewed HEP in detail and adjusted form as needed  Review and adjustment in activation required for TA exercise as patient was not performing this correctly  Progressed hip strengthening with bridges and reverse clamshells without discomfort  Patient to continue original HEP  Patient fatigued following session  Education provided about activity modification as needed to allow healing  Plan: Continue per plan of care        Diagnosis: R hip pain GTPS, LBP   Precautions:    Primary Goals: 1) weakness in hips  2) weakness in core  3) lumbar ROM   *asterisks by exercise = given for HEP   Manuals  5/3 5/10     IASTM/STM R glute and ITB  IM, PT roller                                      There Ex        TM  5 min TM 5 min TM     3 way ball rolls  5 sec x 5 ea 5 sec x 5 ea     LTR*  5 sec x 10      Piriformis S*  3x30 sec uriel      Figure 4 S*  3x30 sec uriel 3x30 sec uriel     FF in chair*        ITB S off of table  5x10 sec R                      Neuro Re-Ed        TA*  1 min hands pushing into thighs 1 min hands on stomach      Kegel*  1 min 1 min     Mult with amps        Mult press with MB  RMB 2x10 forward and OH      Clam  2x10 AROM 2x10 AROM     Bridge   TA and kegel 2x10     Reverse clam   Pillow between knees 3x10 uriel     Standing 3 way SLR   X 10 ea side SLR   2x10 uriel                              Re-evaluation              Ther Act                                         Modalities

## 2023-05-12 ENCOUNTER — OFFICE VISIT (OUTPATIENT)
Dept: PHYSICAL THERAPY | Facility: CLINIC | Age: 78
End: 2023-05-12

## 2023-05-12 DIAGNOSIS — M70.61 GREATER TROCHANTERIC BURSITIS OF RIGHT HIP: Primary | ICD-10-CM

## 2023-05-12 DIAGNOSIS — M54.50 CHRONIC LOW BACK PAIN, UNSPECIFIED BACK PAIN LATERALITY, UNSPECIFIED WHETHER SCIATICA PRESENT: ICD-10-CM

## 2023-05-12 DIAGNOSIS — G89.29 CHRONIC LOW BACK PAIN, UNSPECIFIED BACK PAIN LATERALITY, UNSPECIFIED WHETHER SCIATICA PRESENT: ICD-10-CM

## 2023-05-12 NOTE — PROGRESS NOTES
Daily Note     Today's date: 2023  Patient name: Lupe Broderick  : 1945  MRN: [id number]  Referring provider: Lachelle Payne DO  Dx:   Encounter Diagnosis     ICD-10-CM    1  Greater trochanteric bursitis of right hip  M70 61       2  Chronic low back pain, unspecified back pain laterality, unspecified whether sciatica present  M54 50     G89 29           Start Time: 0900  Stop Time: 09  Total time in clinic (min): 45 minutes    Subjective: Patient states she mowed the grass which took about an hour  She had less than 1/4 left and she needed to take a break because of the pain  She states she did feel better after that  Objective: See treatment diary below      Assessment: Continued with outlined program  Patient responded well to strengthening exercises performed this visit  She did increase reps with mod to max muscle fatigue  She does have near muscle cramping with weight bearing hip strengthening exercises  Patient has good understanding of HEP  Will continue to progress as she is able to tolerate  Plan: Progress treatment as tolerated         Diagnosis: R hip pain GTPS, LBP   Precautions:    Primary Goals: 1) weakness in hips  2) weakness in core  3) lumbar ROM   *asterisks by exercise = given for HEP   Manuals  5/3 5/10 5    IASTM/STM R glute and ITB  IM, PT roller                                      There Ex        TM  5 min TM 5 min TM 5 min TM     3 way ball rolls  5 sec x 5 ea 5 sec x 5 ea 5 sec x5 ea     LTR*  5 sec x 10      Piriformis S*  3x30 sec uriel  3x30 sec uriel     Figure 4 S*  3x30 sec uriel 3x30 sec uriel 3x30 sec uriel     FF in chair*        ITB S off of table  5x10 sec R                      Neuro Re-Ed        TA*  1 min hands pushing into thighs 1 min hands on stomach  1 min hands on stomach    Kegel*  1 min 1 min 1 min     Mult with amps        Mult press with MB  RMB 2x10 forward and OH  RMB 2x10 forwards and OH     Clam  2x10 AROM 2x10 AROM 3x10 AROM     Bridge   TA and kegel 2x10 TA/kegel 2x10    Reverse clam   Pillow between knees 3x10 uriel Pillow betweel knees     Standing 3 way SLR   X 10 ea side x12 ea side     SLR   2x10 uriel 2x10 uriel                              Re-evaluation              Ther Act                                         Modalities

## 2023-05-17 ENCOUNTER — OFFICE VISIT (OUTPATIENT)
Dept: PHYSICAL THERAPY | Facility: CLINIC | Age: 78
End: 2023-05-17

## 2023-05-17 ENCOUNTER — APPOINTMENT (OUTPATIENT)
Dept: PHYSICAL THERAPY | Facility: CLINIC | Age: 78
End: 2023-05-17
Payer: MEDICARE

## 2023-05-17 DIAGNOSIS — M54.50 CHRONIC LOW BACK PAIN, UNSPECIFIED BACK PAIN LATERALITY, UNSPECIFIED WHETHER SCIATICA PRESENT: ICD-10-CM

## 2023-05-17 DIAGNOSIS — M70.61 GREATER TROCHANTERIC BURSITIS OF RIGHT HIP: Primary | ICD-10-CM

## 2023-05-17 DIAGNOSIS — G89.29 CHRONIC LOW BACK PAIN, UNSPECIFIED BACK PAIN LATERALITY, UNSPECIFIED WHETHER SCIATICA PRESENT: ICD-10-CM

## 2023-05-17 NOTE — PROGRESS NOTES
Daily Note     Today's date: 2023  Patient name: Gwen Busch  : 1945  MRN: [id number]  Referring provider: Darius Sellers DO  Dx:   Encounter Diagnosis     ICD-10-CM    1  Greater trochanteric bursitis of right hip  M70 61       2  Chronic low back pain, unspecified back pain laterality, unspecified whether sciatica present  M54 50     G89 29           Start Time: 1355  Stop Time: 1442  Total time in clinic (min): 47 minutes    Subjective: Patient reports that she is stiff all over  She has felt like this for over a week  She has a lot of issues with bending over to get the food bowls  Objective: See treatment diary below    Assessment: Patient instructed to perform her flx stretches before she bends forward to get the food bowls to see if this helps  Patient will trial this throughout her day  Patient was able to tolerate progressions in hip strengthening with YTB added to bridges and clamshells  FOTO reassessed today with improvement in score demonstrating progress towards functional goals  Plan: Progress treatment as tolerated         Diagnosis: R hip pain GTPS, LBP   Precautions:    Primary Goals: 1) weakness in hips  2) weakness in core  3) lumbar ROM   *asterisks by exercise = given for HEP   Manuals  5/3 5/10 5/12 5/17   IASTM/STM R glute and ITB  IM, PT roller                                      There Ex        TM  5 min TM 5 min TM 5 min TM  5 min TM 2 0 mph RPE 4   3 way ball rolls  5 sec x 5 ea 5 sec x 5 ea 5 sec x5 ea  5 sec x 5 ea   LTR*  5 sec x 10   5 sec x 10   Piriformis S*  3x30 sec uriel  3x30 sec uriel  3x30 sec uriel   Figure 4 S*  3x30 sec uriel 3x30 sec uriel 3x30 sec uriel  3x30 sec uriel   FF in chair*     5 sec x 10   ITB S off of table  5x10 sec R                      Neuro Re-Ed        TA*  1 min hands pushing into thighs 1 min hands on stomach  1 min hands on stomach    Kegel*  1 min 1 min 1 min     Mult with amps        Mult press with MB  RMB 2x10 forward and OH  RMB 2x10 forwards and OH  RMB 2x10 forwards and OH    Clam  2x10 AROM 2x10 AROM 3x10 AROM  2x10 YTB    Bridge   TA and kegel 2x10 TA/kegel 2x10 YTB TA/kegel 2x10 5 sec hold   Reverse clam   Pillow between knees 3x10 uriel Pillow between knees 3x10 uriel Pillow between knees 3x10 uriel   Standing 3 way SLR   X 10 ea side x12 ea side     SLR   2x10 uriel 2x10 uriel  2x10 uriel toes pointed up                            Re-evaluation              Ther Act                                         Modalities

## 2023-05-19 ENCOUNTER — APPOINTMENT (OUTPATIENT)
Dept: PHYSICAL THERAPY | Facility: CLINIC | Age: 78
End: 2023-05-19
Payer: MEDICARE

## 2023-05-24 ENCOUNTER — OFFICE VISIT (OUTPATIENT)
Dept: PHYSICAL THERAPY | Facility: CLINIC | Age: 78
End: 2023-05-24

## 2023-05-24 DIAGNOSIS — G89.29 CHRONIC LOW BACK PAIN, UNSPECIFIED BACK PAIN LATERALITY, UNSPECIFIED WHETHER SCIATICA PRESENT: ICD-10-CM

## 2023-05-24 DIAGNOSIS — M70.61 GREATER TROCHANTERIC BURSITIS OF RIGHT HIP: Primary | ICD-10-CM

## 2023-05-24 DIAGNOSIS — M54.50 CHRONIC LOW BACK PAIN, UNSPECIFIED BACK PAIN LATERALITY, UNSPECIFIED WHETHER SCIATICA PRESENT: ICD-10-CM

## 2023-05-24 NOTE — PROGRESS NOTES
"Daily Note     Today's date: 2023  Patient name: Paul Craig  : 1945  MRN: [id number]  Referring provider: Didi Ortez DO  Dx:   Encounter Diagnosis     ICD-10-CM    1  Greater trochanteric bursitis of right hip  M70 61       2  Chronic low back pain, unspecified back pain laterality, unspecified whether sciatica present  M54 50     G89 29                      Subjective: Patient stated moderate stiffness prior to treatment session attributed to performing gardening activities yesterday  Objective: See treatment diary below    Assessment: Patient demonstrated moderate challenge with addition of SLR abduction; no c/o with addition of supine TA with Pball presses  Plan: Progress treatment as tolerated         Diagnosis: R hip pain GTPS, LBP   Precautions:    Primary Goals: 1) weakness in hips  2) weakness in core  3) lumbar ROM   *asterisks by exercise = given for HEP   Manuals 5/24 5/3 5/10 5/12 5/17   IASTM/STM R glute and ITB  IM, PT roller                                      There Ex        TM 5 min TM 2 0 mph 5 min TM 5 min TM 5 min TM  5 min TM 2 0 mph RPE 4   3 way ball rolls 5 sec x 5 ea 5 sec x 5 ea 5 sec x 5 ea 5 sec x5 ea  5 sec x 5 ea   LTR* 5 sec x 10 5 sec x 10   5 sec x 10   Piriformis S* 3x30 sec uriel 3x30 sec uriel  3x30 sec uriel  3x30 sec uriel   Figure 4 S* 3x30 sec uriel 3x30 sec uriel 3x30 sec uriel 3x30 sec uriel  3x30 sec uriel   FF in chair*     5 sec x 10   ITB S off of table  5x10 sec R                      Neuro Re-Ed        TA* With supine Pball press 20x5\" 1 min hands pushing into thighs 1 min hands on stomach  1 min hands on stomach    Kegel*  1 min 1 min 1 min     Mult with amps        Mult press with MB RMB 2x10 forwards and OH  RMB 2x10 forward and OH  RMB 2x10 forwards and OH  RMB 2x10 forwards and OH    Clam 2x10 YTB  2x10 AROM 2x10 AROM 3x10 AROM  2x10 YTB    Bridge YTB TA/kegel 2x10 5 sec hold  TA and kegel 2x10 TA/kegel 2x10 YTB TA/kegel 2x10 5 sec hold   Reverse clam " Pillow between knees 3x10 uriel  Pillow between knees 3x10 uriel Pillow between knees 3x10 uriel Pillow between knees 3x10 uriel   Standing 3 way SLR   X 10 ea side x12 ea side     SLR 2x10 uriel toes pointed up  2x10 uriel 2x10 uriel  2x10 uriel toes pointed up   SLR abd 2x10 ea                          Re-evaluation              Ther Act                                         Modalities

## 2023-05-26 ENCOUNTER — OFFICE VISIT (OUTPATIENT)
Dept: PHYSICAL THERAPY | Facility: CLINIC | Age: 78
End: 2023-05-26

## 2023-05-26 DIAGNOSIS — G89.29 CHRONIC LOW BACK PAIN, UNSPECIFIED BACK PAIN LATERALITY, UNSPECIFIED WHETHER SCIATICA PRESENT: ICD-10-CM

## 2023-05-26 DIAGNOSIS — M54.50 CHRONIC LOW BACK PAIN, UNSPECIFIED BACK PAIN LATERALITY, UNSPECIFIED WHETHER SCIATICA PRESENT: ICD-10-CM

## 2023-05-26 DIAGNOSIS — M70.61 GREATER TROCHANTERIC BURSITIS OF RIGHT HIP: Primary | ICD-10-CM

## 2023-05-26 NOTE — PROGRESS NOTES
"Daily Note     Today's date: 2023  Patient name: Lara Goncalves  : 1945  MRN: [id number]  Referring provider: Ester Capps DO  Dx:   Encounter Diagnosis     ICD-10-CM    1  Greater trochanteric bursitis of right hip  M70 61       2  Chronic low back pain, unspecified back pain laterality, unspecified whether sciatica present  M54 50     G89 29                      Subjective: Patient stated no significant pain prior to treatment session  Objective: See treatment diary below    Assessment: Patient demonstrated significant challenge with progression to YMB with press outs and overhead; no c/o at completion of treatment session  Plan: Progress treatment as tolerated         Diagnosis: R hip pain GTPS, LBP   Precautions:    Primary Goals: 1) weakness in hips  2) weakness in core  3) lumbar ROM   *asterisks by exercise = given for HEP   Manuals 5/24 5/26 5/10 5/12 5/17   IASTM/STM R glute and ITB                                        There Ex        TM 5 min TM 2 0 mph 5 min TM 5 min TM 5 min TM  5 min TM 2 0 mph RPE 4   3 way ball rolls 5 sec x 5 ea 5 sec x 10 ea 5 sec x 5 ea 5 sec x5 ea  5 sec x 5 ea   LTR* 5 sec x 10 5 sec x 10   5 sec x 10   Piriformis S* 3x30 sec uriel 3x30 sec uriel  3x30 sec uriel  3x30 sec ureil   Figure 4 S* 3x30 sec uriel 3x30 sec uriel 3x30 sec uriel 3x30 sec uriel  3x30 sec uriel   FF in chair*     5 sec x 10   ITB S off of table                        Neuro Re-Ed        TA* With supine Pball press 20x5\" With supine Pball press 20x5\" 1 min hands on stomach  1 min hands on stomach    Kegel*   1 min 1 min     Mult with amps        Mult press with MB RMB 2x10 forwards and OH  YMB 2x10 forwards and OH  RMB 2x10 forwards and OH  RMB 2x10 forwards and OH    Clam 2x10 YTB  2x10 YTB  2x10 AROM 3x10 AROM  2x10 YTB    Bridge YTB TA/kegel 2x10 5 sec hold YTB TA/kegel 2x10 5 sec hold TA and kegel 2x10 TA/kegel 2x10 YTB TA/kegel 2x10 5 sec hold   Reverse clam Pillow between knees 3x10 uriel YTB pillow bet " knees 3x10 uriel Pillow between knees 3x10 uriel Pillow between knees 3x10 uriel Pillow between knees 3x10 uriel   Standing 3 way SLR   X 10 ea side x12 ea side     SLR 2x10 uriel toes pointed up 2x10 uriel toes pointed up 2x10 uriel 2x10 uriel  2x10 uriel toes pointed up   SLR abd 2x10 ea   2x10 ea                       Re-evaluation              Ther Act                                         Modalities

## 2023-05-30 ENCOUNTER — OFFICE VISIT (OUTPATIENT)
Dept: PHYSICAL THERAPY | Facility: CLINIC | Age: 78
End: 2023-05-30

## 2023-05-30 DIAGNOSIS — G89.29 CHRONIC LOW BACK PAIN, UNSPECIFIED BACK PAIN LATERALITY, UNSPECIFIED WHETHER SCIATICA PRESENT: ICD-10-CM

## 2023-05-30 DIAGNOSIS — M54.50 CHRONIC LOW BACK PAIN, UNSPECIFIED BACK PAIN LATERALITY, UNSPECIFIED WHETHER SCIATICA PRESENT: ICD-10-CM

## 2023-05-30 DIAGNOSIS — M70.61 GREATER TROCHANTERIC BURSITIS OF RIGHT HIP: Primary | ICD-10-CM

## 2023-05-30 NOTE — PROGRESS NOTES
"Daily Note     Today's date: 2023  Patient name: Sayda Preciado  : 1945  MRN: [id number]  Referring provider: Ronak Banegas DO  Dx:   Encounter Diagnosis     ICD-10-CM    1  Greater trochanteric bursitis of right hip  M70 61       2  Chronic low back pain, unspecified back pain laterality, unspecified whether sciatica present  M54 50     G89 29           Start Time: 1445  Stop Time: 1530  Total time in clinic (min): 45 minutes    Subjective: Patient states she has not noticed any improvement for her LB since beginning therapy  She states she does notice improvement with her hip  She does feel stretching before activity in the morning has helped overall  Objective: See treatment diary below      Assessment: Continued with outlined program  Patient was able to stretch prior to performing strengthening exercises  She progressed with multifidus with amplifiers which she is challenged with initially  Trialed biofeedback for visual feedback of muscle activation which was helpful; given exercise for HEP  She has noticeable muscle fatigue with strengthening exercises  Will continue to progress as she is able to tolerate  Plan: Progress treatment as tolerated         Diagnosis: R hip pain GTPS, LBP   Precautions:    Primary Goals: 1) weakness in hips  2) weakness in core  3) lumbar ROM   *asterisks by exercise = given for HEP   Manuals    IASTM/STM R glute and ITB                                        There Ex        TM 5 min TM 2 0 mph 5 min TM 5 min TM 5 min TM  5 min TM 2 0 mph RPE 4   3 way ball rolls 5 sec x 5 ea 5 sec x 10 ea 5 sec x 10 ea 5 sec x5 ea  5 sec x 5 ea   LTR* 5 sec x 10 5 sec x 10   5 sec x 10   Piriformis S* 3x30 sec uriel 3x30 sec uriel  3x30 sec uriel  3x30 sec uriel   Figure 4 S* 3x30 sec uriel 3x30 sec uriel  3x30 sec uriel  3x30 sec uriel   FF in chair*     5 sec x 10   ITB S off of table                        Neuro Re-Ed        TA* With supine Pball press 20x5\" With " "supine Pball press 20x5\" With supine Pball press 5 sec x20  1 min hands on stomach    Kegel*    1 min     Mult with amps*   10x10 sec      Mult press with MB RMB 2x10 forwards and OH  YMB 2x10 forwards and OH  RMB 2x10 forwards and OH  RMB 2x10 forwards and OH    Clam 2x10 YTB  2x10 YTB  YTB 2x10  3x10 AROM  2x10 YTB    Bridge YTB TA/kegel 2x10 5 sec hold YTB TA/kegel 2x10 5 sec hold YTB 2x10  TA/kegel 2x10 YTB TA/kegel 2x10 5 sec hold   Reverse clam Pillow between knees 3x10 uriel YTB pillow bet knees 3x10 uriel YTB pillow bet knees 3x10 uriel  Pillow between knees 3x10 uriel Pillow between knees 3x10 uriel   Standing 3 way SLR    x12 ea side     SLR 2x10 uriel toes pointed up 2x10 uriel toes pointed up 2x10 uriel toes pointed up  2x10 uriel  2x10 uriel toes pointed up   SLR abd 2x10 ea   2x10 ea                       Re-evaluation             Ther Act                                      Modalities                                                               "

## 2023-06-01 ENCOUNTER — EVALUATION (OUTPATIENT)
Dept: PHYSICAL THERAPY | Facility: CLINIC | Age: 78
End: 2023-06-01

## 2023-06-01 DIAGNOSIS — M70.61 GREATER TROCHANTERIC BURSITIS OF RIGHT HIP: ICD-10-CM

## 2023-06-01 DIAGNOSIS — M54.50 CHRONIC LOW BACK PAIN, UNSPECIFIED BACK PAIN LATERALITY, UNSPECIFIED WHETHER SCIATICA PRESENT: Primary | ICD-10-CM

## 2023-06-01 DIAGNOSIS — G89.29 CHRONIC LOW BACK PAIN, UNSPECIFIED BACK PAIN LATERALITY, UNSPECIFIED WHETHER SCIATICA PRESENT: Primary | ICD-10-CM

## 2023-06-01 NOTE — LETTER
2023    [facility] [address]    Patient: Tasia Alfred   YOB: 1945   Date of Visit: 2023     Encounter Diagnosis     ICD-10-CM    1  Chronic low back pain, unspecified back pain laterality, unspecified whether sciatica present  M54 50     G89 29       2  Greater trochanteric bursitis of right hip  M70 61           Dear Dr Lenin Peres:    Thank you for your recent referral of Tasia Alfred  Please review the attached evaluation summary from Liz's recent visit  Please verify that you agree with the plan of care by signing the attached order  If you have any questions or concerns, please do not hesitate to call  I sincerely appreciate the opportunity to share in the care of one of your patients and hope to have another opportunity to work with you in the near future  Sincerely,    Alicia Leblanc, PT      Referring Provider:      I certify that I have read the below Plan of Care and certify the need for these services furnished under this plan of treatment while under my care  Cande Gustafson DO  [address] [place] [id number]  Via Fax: [phone]          PT Re-Evaluation     Today's date: 2023  Patient name: Tasia Alfred  : 1945  MRN: [id number]  Referring provider: Isela Mcmahon DO  Dx:   Encounter Diagnosis     ICD-10-CM    1  Chronic low back pain, unspecified back pain laterality, unspecified whether sciatica present  M54 50     G89 29       2  Greater trochanteric bursitis of right hip  M70 61           Start Time: 1130  Stop Time: 1215  Total time in clinic (min): 45 minutes    Assessment  Assessment details: Tasia Alfred is a pleasant 76 y o  female who presents to RE with chronic low back and R hip pain with s/s of GTPS  Her hips are no longer painful and patient has made progress in hip strength   Her low back pain continues to limit her functional mobility, and she presents with stiffness of her spine and weakness in core and hips that contribute to functional impairments including bending, pulling weeds, and getting out of a chair  She would benefit from continued PT focusing on low back  Primary movement impairment diagnosis of weakness hips resulting in pathoanatomical symptoms of Chronic low back pain, unspecified back pain laterality, unspecified whether sciatica present  (primary encounter diagnosis) and limiting her ability to carry, exercise or recreation, lift, perform household chores, perform yard work, stand and walk  Impairments include:  1) weakness in hips  2) weakness in core  3) lumbar ROM    Discussed risks, benefits, and alternatives to treatment, and answered all patient questions to patient satisfaction    Impairments: abnormal muscle firing, abnormal muscle tone, abnormal or restricted ROM, abnormal movement, impaired physical strength, lacks appropriate home exercise program, pain with function and poor posture   Understanding of Dx/Px/POC: good   Prognosis: good    Goals  Impairment Goals 4-6 weeks  - Decrease pain to <3/10 - partially met  - Improve lumbar AROM to >75% throughout - partially met  - Increase hip strength to 4+/5 throughout - partially met  - Increase core strength to be able to sit straight up without deviation - partially met    Functional Goals 6-8 weeks  - Return to Prior Level of Function - partially met  - Patient will be independent with HEP - partially met  - Patient will be able to walk without increased pain/compensation/difficulty - partially met  - Patient will be able to mow her lawn without increased pain/compensation/difficulty  - partially met  - Patient will be able to pull weeds without increased pain/compensation/difficulty  - partially met  - Patient will be able to go up and down the stairs with proper mechanics and no pain - partially met      Plan  Plan details: Prognosis above is given PT services 2x/week tapering to 1x/week over the next month and home program adherence  Patient would benefit from: skilled physical therapy  Planned modality interventions: cryotherapy, TENS, thermotherapy: hydrocollator packs and unattended electrical stimulation  Planned therapy interventions: abdominal trunk stabilization, behavior modification, body mechanics training, breathing training, flexibility, functional ROM exercises, home exercise program, joint mobilization, manual therapy, massage, Olivera taping, muscle pump exercises, neuromuscular re-education, patient education, postural training, strengthening, stretching, therapeutic activities, therapeutic exercise, therapeutic training and balance  Frequency: 2x week  Duration in weeks: 4  Treatment plan discussed with: patient        Subjective Evaluation    History of Present Illness  Mechanism of injury: WORK/SCHOOL: retired  HOME LIFE: First floor set up with basement for laundry and TV room  Lives alone at home with cat  HOBBIES/EXERCISE: yard work  PLOF: none  HISTORY OF CURRENT INJURY:  Patient started having pain 4 weeks ago  She does not remember doing anything differently  She had leg and hip pain on the R side  She noticed pain in her low back with prolonged walking and standing  Patient denies numbness/tingling  She reports pain has not traveled into the leg for a few weeks now  She is very independent and wishes to continue being independent  Update: Patient reports that her R hip is much better  The L hip is doing okay as well  She does still have back pain  She has the most pain when she bends but also when she gets up from a chair  She is ok once she is up and moving  IT is worse with prolonged sitting  Patient returns to Dr Denice Jimenez at the end of June  PAIN LOCATION/DESCRIPTORS: Pain no longer in hips but in center of low back  Pain at sacrum and up to L1, worst at the bottom of the spine     AGGRAVATING FACTORS:  bending, pulling weeds, kneeling, stairs, pulling the hose, getting out of a chair  Improved: mowing the lawn, stairs  EASES: rest with feet up  DAY PATTERN: no pattern  IMAGING: x-ray   Marked levoscoliosis  Severe multilevel degenerative changes in the lumbar  spine  SPECIAL QUESTIONS:    Thien Ansari denies a new onset of Bladder incontinence, Bowel dysfunction, Constant night pain, Tingling and Numbness  Hx of skin CA  PELVIC FLOOR: Do you have pelvic pain or chronic constipation? none  PATIENT GOALS: Patient wishes to be able to get rid of her aches and pains so she can function  - 50-60% improved at this time, with low back causing most of her discomfort at this time       Pain  Current pain ratin  At best pain ratin  At worst pain ratin  Location: low back  Quality: dull ache and discomfort  Progression: improved    Patient Goals  Patient goals for therapy: decreased pain, increased motion, increased strength, independence with ADLs/IADLs and return to sport/leisure activities          Objective     Active Range of Motion     Lumbar   Flexion: 50 degrees  with pain Restriction level: minimal  Extension: 30 degrees  with pain Restriction level: moderate  Left lateral flexion:  with pain Restriction level: moderate  Right lateral flexion:  WFL  Left rotation:  WFL  Right rotation:  [facility]    Additional Active Range of Motion Details  Soreness in low back with flx/extand L lateral flexion    Strength/Myotome Testing     Left Hip   Planes of Motion   Flexion: 4-  Extension: 3+  Abduction: 4-  External rotation: 4+  Internal rotation: 4    Right Hip   Planes of Motion   Flexion: 4+  Extension: 4-  Abduction: 4-  External rotation: 4+  Internal rotation: 4+    Left Knee   Flexion: 4+  Extension: 4+    Right Knee   Flexion: 4+  Extension: 4+    Left Ankle/Foot   Dorsiflexion: 4+    Right Ankle/Foot   Dorsiflexion: 4+    Tests     Lumbar     Left   Negative crossed SLR, passive SLR and slump test      Right   Negative crossed SLR, passive SLR and slump test      Left Pelvic Girdle/Sacrum "  Negative: thigh thrust      Right Pelvic Girdle/Sacrum   Negative: thigh thrust      Left Hip   Negative ZAK and FADIR  Right Hip   Negative ZAK and FADIR  Additional Tests Details  PROM uriel hips WNL  No longer TTP of R GT and hip ER    Lumbar PA hypomobile and painful                 Diagnosis: R hip pain GTPS, LBP   Precautions:    Primary Goals: 1) weakness in hips  2) weakness in core  3) lumbar ROM   *asterisks by exercise = given for HEP   Manuals 5/24 5/26 5/30 6/1 5/17   IASTM/STM R glute and ITB                                        There Ex        TM 5 min TM 2 0 mph 5 min TM 5 min TM 5 min TM RPE 3/10 5 min TM 2 0 mph RPE 4   3 way ball rolls 5 sec x 5 ea 5 sec x 10 ea 5 sec x 10 ea  5 sec x 5 ea   LTR* 5 sec x 10 5 sec x 10   5 sec x 10   Piriformis S* 3x30 sec uriel 3x30 sec uriel   3x30 sec uriel   Figure 4 S* 3x30 sec uriel 3x30 sec uriel   3x30 sec uriel   FF in chair*     5 sec x 10   ITB S off of table        Pelvic tilts sitting    NV    SKTC    NV    FF in chair    NV    Standing extensions    NV                    Neuro Re-Ed        TA* With supine Pball press 20x5\" With supine Pball press 20x5\" With supine Pball press 5 sec x20      Kegel*        Mult with amps*   10x10 sec      Mult press with MB RMB 2x10 forwards and OH  YMB 2x10 forwards and OH   RMB 2x10 forwards and OH    Clam 2x10 YTB  2x10 YTB  YTB 2x10   2x10 YTB    Bridge YTB TA/kegel 2x10 5 sec hold YTB TA/kegel 2x10 5 sec hold YTB 2x10   YTB TA/kegel 2x10 5 sec hold   Reverse clam Pillow between knees 3x10 uriel YTB pillow bet knees 3x10 uriel YTB pillow bet knees 3x10 uriel   Pillow between knees 3x10 uriel   Standing 3 way SLR        SLR 2x10 uriel toes pointed up 2x10 uriel toes pointed up 2x10 uirel toes pointed up   2x10 uriel toes pointed up   SLR abd 2x10 ea   2x10 ea      Standing bird dogs    NV    Mult walk outs    NV                             Re-evaluation            Ther Act                                    Modalities               "

## 2023-06-01 NOTE — PROGRESS NOTES
PT Re-Evaluation     Today's date: 2023  Patient name: Sayda Preciado  : 1945  MRN: [id number]  Referring provider: oRnak Banegas DO  Dx:   Encounter Diagnosis     ICD-10-CM    1  Chronic low back pain, unspecified back pain laterality, unspecified whether sciatica present  M54 50     G89 29       2  Greater trochanteric bursitis of right hip  M70 61           Start Time: 1130  Stop Time: 1215  Total time in clinic (min): 45 minutes    Assessment  Assessment details: Sayda Preciado is a pleasant 76 y o  female who presents to RE with chronic low back and R hip pain with s/s of GTPS  Her hips are no longer painful and patient has made progress in hip strength  Her low back pain continues to limit her functional mobility, and she presents with stiffness of her spine and weakness in core and hips that contribute to functional impairments including bending, pulling weeds, and getting out of a chair  She would benefit from continued PT focusing on low back  Primary movement impairment diagnosis of weakness hips resulting in pathoanatomical symptoms of Chronic low back pain, unspecified back pain laterality, unspecified whether sciatica present  (primary encounter diagnosis) and limiting her ability to carry, exercise or recreation, lift, perform household chores, perform yard work, stand and walk  Impairments include:  1) weakness in hips  2) weakness in core  3) lumbar ROM    Discussed risks, benefits, and alternatives to treatment, and answered all patient questions to patient satisfaction    Impairments: abnormal muscle firing, abnormal muscle tone, abnormal or restricted ROM, abnormal movement, impaired physical strength, lacks appropriate home exercise program, pain with function and poor posture   Understanding of Dx/Px/POC: good   Prognosis: good    Goals  Impairment Goals 4-6 weeks  - Decrease pain to <3/10 - partially met  - Improve lumbar AROM to >75% throughout - partially met  - Increase hip Patient to ED with Mom and  Grandmother for evaluation of fussy and rubbing on the shunts in his head. Mom states he is also sleeping more than his norm. Instructed to come to ED by Neuro.  He is scheduled for another shunt surgery in February.  Fontanel firm,smooth on palpation.   strength to 4+/5 throughout - partially met  - Increase core strength to be able to sit straight up without deviation - partially met    Functional Goals 6-8 weeks  - Return to Prior Level of Function - partially met  - Patient will be independent with HEP - partially met  - Patient will be able to walk without increased pain/compensation/difficulty - partially met  - Patient will be able to mow her lawn without increased pain/compensation/difficulty  - partially met  - Patient will be able to pull weeds without increased pain/compensation/difficulty  - partially met  - Patient will be able to go up and down the stairs with proper mechanics and no pain - partially met      Plan  Plan details: Prognosis above is given PT services 2x/week tapering to 1x/week over the next month and home program adherence  Patient would benefit from: skilled physical therapy  Planned modality interventions: cryotherapy, TENS, thermotherapy: hydrocollator packs and unattended electrical stimulation  Planned therapy interventions: abdominal trunk stabilization, behavior modification, body mechanics training, breathing training, flexibility, functional ROM exercises, home exercise program, joint mobilization, manual therapy, massage, Olivera taping, muscle pump exercises, neuromuscular re-education, patient education, postural training, strengthening, stretching, therapeutic activities, therapeutic exercise, therapeutic training and balance  Frequency: 2x week  Duration in weeks: 4  Treatment plan discussed with: patient        Subjective Evaluation    History of Present Illness  Mechanism of injury: WORK/SCHOOL: retired  HOME LIFE: First floor set up with basement for laundry and TV room  Lives alone at home with cat  HOBBIES/EXERCISE: yard work  PLOF: none  HISTORY OF CURRENT INJURY:  Patient started having pain 4 weeks ago  She does not remember doing anything differently  She had leg and hip pain on the R side   She noticed pain in her low back with prolonged walking and standing  Patient denies numbness/tingling  She reports pain has not traveled into the leg for a few weeks now  She is very independent and wishes to continue being independent  Update: Patient reports that her R hip is much better  The L hip is doing okay as well  She does still have back pain  She has the most pain when she bends but also when she gets up from a chair  She is ok once she is up and moving  IT is worse with prolonged sitting  Patient returns to Dr Marc Fontanez at the end of   PAIN LOCATION/DESCRIPTORS: Pain no longer in hips but in center of low back  Pain at sacrum and up to L1, worst at the bottom of the spine  AGGRAVATING FACTORS:  bending, pulling weeds, kneeling, stairs, pulling the hose, getting out of a chair  Improved: mowing the lawn, stairs  EASES: rest with feet up  DAY PATTERN: no pattern  IMAGING: x-ray   Marked levoscoliosis  Severe multilevel degenerative changes in the lumbar  spine  SPECIAL QUESTIONS:    Deseandevan Loi denies a new onset of Bladder incontinence, Bowel dysfunction, Constant night pain, Tingling and Numbness  Hx of skin CA  PELVIC FLOOR: Do you have pelvic pain or chronic constipation? none  PATIENT GOALS: Patient wishes to be able to get rid of her aches and pains so she can function  - 50-60% improved at this time, with low back causing most of her discomfort at this time       Pain  Current pain ratin  At best pain ratin  At worst pain ratin  Location: low back  Quality: dull ache and discomfort  Progression: improved    Patient Goals  Patient goals for therapy: decreased pain, increased motion, increased strength, independence with ADLs/IADLs and return to sport/leisure activities          Objective     Active Range of Motion     Lumbar   Flexion: 50 degrees  with pain Restriction level: minimal  Extension: 30 degrees  with pain Restriction level: moderate  Left lateral flexion:  with pain Restriction level: "moderate  Right lateral flexion:  WFL  Left rotation:  WFL  Right rotation:  [place]    Additional Active Range of Motion Details  Soreness in low back with flx/extand L lateral flexion    Strength/Myotome Testing     Left Hip   Planes of Motion   Flexion: 4-  Extension: 3+  Abduction: 4-  External rotation: 4+  Internal rotation: 4    Right Hip   Planes of Motion   Flexion: 4+  Extension: 4-  Abduction: 4-  External rotation: 4+  Internal rotation: 4+    Left Knee   Flexion: 4+  Extension: 4+    Right Knee   Flexion: 4+  Extension: 4+    Left Ankle/Foot   Dorsiflexion: 4+    Right Ankle/Foot   Dorsiflexion: 4+    Tests     Lumbar     Left   Negative crossed SLR, passive SLR and slump test      Right   Negative crossed SLR, passive SLR and slump test      Left Pelvic Girdle/Sacrum   Negative: thigh thrust      Right Pelvic Girdle/Sacrum   Negative: thigh thrust      Left Hip   Negative ZAK and FADIR  Right Hip   Negative ZAK and FADIR       Additional Tests Details  PROM uriel hips WNL  No longer TTP of R GT and hip ER    Lumbar PA hypomobile and painful                  Diagnosis: R hip pain GTPS, LBP   Precautions:    Primary Goals: 1) weakness in hips  2) weakness in core  3) lumbar ROM   *asterisks by exercise = given for HEP   Manuals 5/24 5/26 5/30 6/1 5/17   IASTM/STM R glute and ITB                                        There Ex        TM 5 min TM 2 0 mph 5 min TM 5 min TM 5 min TM RPE 3/10 5 min TM 2 0 mph RPE 4   3 way ball rolls 5 sec x 5 ea 5 sec x 10 ea 5 sec x 10 ea  5 sec x 5 ea   LTR* 5 sec x 10 5 sec x 10   5 sec x 10   Piriformis S* 3x30 sec uriel 3x30 sec uriel   3x30 sec uriel   Figure 4 S* 3x30 sec uriel 3x30 sec uriel   3x30 sec uriel   FF in chair*     5 sec x 10   ITB S off of table        Pelvic tilts sitting    NV    SKTC    NV    FF in chair    NV    Standing extensions    NV                    Neuro Re-Ed        TA* With supine Pball press 20x5\" With supine Pball press 20x5\" With supine Pball press " 5 sec x20      Kegel*        Mult with amps*   10x10 sec      Mult press with MB RMB 2x10 forwards and OH  YMB 2x10 forwards and OH   RMB 2x10 forwards and OH    Clam 2x10 YTB  2x10 YTB  YTB 2x10   2x10 YTB    Bridge YTB TA/kegel 2x10 5 sec hold YTB TA/kegel 2x10 5 sec hold YTB 2x10   YTB TA/kegel 2x10 5 sec hold   Reverse clam Pillow between knees 3x10 uriel YTB pillow bet knees 3x10 uriel YTB pillow bet knees 3x10 uriel   Pillow between knees 3x10 uriel   Standing 3 way SLR        SLR 2x10 uriel toes pointed up 2x10 uriel toes pointed up 2x10 uriel toes pointed up   2x10 uriel toes pointed up   SLR abd 2x10 ea   2x10 ea      Standing bird dogs    NV    Mult walk outs    NV                             Re-evaluation            Ther Act                                    Modalities

## 2023-06-06 ENCOUNTER — OFFICE VISIT (OUTPATIENT)
Dept: PHYSICAL THERAPY | Facility: CLINIC | Age: 78
End: 2023-06-06
Payer: MEDICARE

## 2023-06-06 DIAGNOSIS — G89.29 CHRONIC LOW BACK PAIN, UNSPECIFIED BACK PAIN LATERALITY, UNSPECIFIED WHETHER SCIATICA PRESENT: Primary | ICD-10-CM

## 2023-06-06 DIAGNOSIS — M70.61 GREATER TROCHANTERIC BURSITIS OF RIGHT HIP: ICD-10-CM

## 2023-06-06 DIAGNOSIS — M54.50 CHRONIC LOW BACK PAIN, UNSPECIFIED BACK PAIN LATERALITY, UNSPECIFIED WHETHER SCIATICA PRESENT: Primary | ICD-10-CM

## 2023-06-06 PROCEDURE — 97112 NEUROMUSCULAR REEDUCATION: CPT | Performed by: PHYSICAL THERAPIST

## 2023-06-06 PROCEDURE — 97110 THERAPEUTIC EXERCISES: CPT | Performed by: PHYSICAL THERAPIST

## 2023-06-06 NOTE — PROGRESS NOTES
"Daily Note     Today's date: 2023  Patient name: Vale Barclay  : 1945  MRN: [id number]  Referring provider: Damien Bello DO  Dx:   Encounter Diagnosis     ICD-10-CM    1  Chronic low back pain, unspecified back pain laterality, unspecified whether sciatica present  M54 50     G89 29       2  Greater trochanteric bursitis of right hip  M70 61           Start Time: 1000  Stop Time: 1044  Total time in clinic (min): 44 minutes    Subjective: Patient notes that when she thinks about it and does some stretching before she gets up and it does help  Objective: See treatment diary below    Assessment: Patient tolerated new exercises well, with most challenge noted with MD walk outs  Patient had a near loss of balance when sidestepping back to machine to the right, indicating weakness in the L side of her paraspinals where her discomfort is located  Patient encouraged to continue stretching prior to deep bending which is helping her pain  Plan: Progress treatment as tolerated         Diagnosis: R hip pain GTPS, LBP   Precautions:    Primary Goals: 1) weakness in hips  2) weakness in core  3) lumbar ROM   *asterisks by exercise = given for HEP   Manuals    IASTM/STM R glute and ITB                                        There Ex        TM 5 min TM 2 0 mph 5 min TM 5 min TM 5 min TM RPE 3/10 5 min TM RPE 4/10   3 way ball rolls 5 sec x 5 ea 5 sec x 10 ea 5 sec x 10 ea  5 sec x 10 ea   LTR* 5 sec x 10 5 sec x 10   5 sec x 10   Piriformis S* 3x30 sec uriel 3x30 sec uriel   5 sec x 10   Figure 4 S* 3x30 sec uriel 3x30 sec uriel      FF in chair*        ITB S off of table        Pelvic tilts sitting    NV X 10 reps   SKTC    NV    FF in chair    NV X 10 reps   Standing extensions    NV X 10 against table gentle                   Neuro Re-Ed        TA* With supine Pball press 20x5\" With supine Pball press 20x5\" With supine Pball press 5 sec x20      Kegel*        Mult with amps*   10x10 sec    " Mult press with MB RMB 2x10 forwards and OH  YMB 2x10 forwards and OH      Clam 2x10 YTB  2x10 YTB  YTB 2x10   YTB 3x10 uriel   Bridge YTB TA/kegel 2x10 5 sec hold YTB TA/kegel 2x10 5 sec hold YTB 2x10      Reverse clam Pillow between knees 3x10 uriel YTB pillow bet knees 3x10 uriel YTB pillow bet knees 3x10 uriel      Standing 3 way SLR        SLR 2x10 uriel toes pointed up 2x10 uriel toes pointed up 2x10 uriel toes pointed up      SLR abd 2x10 ea   2x10 ea      Standing bird dogs    NV    Mult walk outs    NV 5# sidesteps x5 ea - challenge and near LOB   Prone alt arm and leg raises    NV X 10 arms, legs, uriel                    Re-evaluation           Ther Act                                    Modalities

## 2023-06-08 ENCOUNTER — APPOINTMENT (OUTPATIENT)
Dept: PHYSICAL THERAPY | Facility: CLINIC | Age: 78
End: 2023-06-08
Payer: MEDICARE

## 2023-06-15 ENCOUNTER — OFFICE VISIT (OUTPATIENT)
Dept: PHYSICAL THERAPY | Facility: CLINIC | Age: 78
End: 2023-06-15
Payer: MEDICARE

## 2023-06-15 DIAGNOSIS — G89.29 CHRONIC LOW BACK PAIN, UNSPECIFIED BACK PAIN LATERALITY, UNSPECIFIED WHETHER SCIATICA PRESENT: Primary | ICD-10-CM

## 2023-06-15 DIAGNOSIS — M70.61 GREATER TROCHANTERIC BURSITIS OF RIGHT HIP: ICD-10-CM

## 2023-06-15 DIAGNOSIS — M54.50 CHRONIC LOW BACK PAIN, UNSPECIFIED BACK PAIN LATERALITY, UNSPECIFIED WHETHER SCIATICA PRESENT: Primary | ICD-10-CM

## 2023-06-15 PROCEDURE — 97110 THERAPEUTIC EXERCISES: CPT

## 2023-06-15 PROCEDURE — 97112 NEUROMUSCULAR REEDUCATION: CPT

## 2023-06-15 NOTE — PROGRESS NOTES
Daily Note     Today's date: 6/15/2023  Patient name: General Mcguire  : 1945  MRN: [id number]  Referring provider: Nathanial Fleischer, DO  Dx:   Encounter Diagnosis     ICD-10-CM    1  Chronic low back pain, unspecified back pain laterality, unspecified whether sciatica present  M54 50     G89 29       2  Greater trochanteric bursitis of right hip  M70 61           Start Time: 1205  Stop Time: 1245  Total time in clinic (min): 40 minutes    Subjective: Patient states she is doing better  She states she does not do everything she should, but she has been busy and doing other things  She states she does find herself tightening her core when she does things like mowing the grass  Objective: See treatment diary below      Assessment: Continued with outlined program  Patient responded well to stretches to begin session  She was able to perform prone alt arms/legs with cues for technique, but good tolerance  Patient performed walk outs with use of Tband with improved stability and control with both forward and lateral movement  She progressed with Tband rows/ext with core activation  She is making steady progress towards goals  Plan: Progress treatment as tolerated         Diagnosis: R hip pain GTPS, LBP   Precautions:    Primary Goals: 1) weakness in hips  2) weakness in core  3) lumbar ROM   *asterisks by exercise = given for HEP   Manuals 6/15 5/26 5/30 6/1 6/6   IASTM/STM R glute and ITB                                        There Ex        TM 5 min TM 2 0 mph 5 min TM 5 min TM 5 min TM RPE 3/10 5 min TM RPE 4/10   3 way ball rolls 5 sec x10 ea  5 sec x 10 ea 5 sec x 10 ea  5 sec x 10 ea   LTR*  5 sec x 10   5 sec x 10   Piriformis S* 3x30 sec uriel  3x30 sec uriel   5 sec x 10   Figure 4 S*  3x30 sec uriel      FF in chair*        ITB S off of table        Pelvic tilts sitting    NV X 10 reps   SKTC    NV    FF in chair    NV X 10 reps   Standing extensions    NV X 10 against table gentle "  Neuro Re-Ed        TA*  With supine Pball press 20x5\" With supine Pball press 5 sec x20      Kegel*        Mult with amps*   10x10 sec      Mult press with MB  YMB 2x10 forwards and OH      Clam  2x10 YTB  YTB 2x10   YTB 3x10 uriel   Bridge  YTB TA/kegel 2x10 5 sec hold YTB 2x10      Reverse clam  YTB pillow bet knees 3x10 uriel YTB pillow bet knees 3x10 uriel      Standing 3 way SLR        SLR  2x10 uriel toes pointed up 2x10 uriel toes pointed up      SLR abd  2x10 ea      Standing bird dogs    NV    Mult walk outs Blk TB 10x fwd    NV 5# sidesteps x5 ea - challenge and near LOB   Prone alt arm and leg raises x10 arms/leg opp uriel    NV X 10 arms, legs, uriel   Tband rows/ext BlTB 2x10 ea        Wobble board  1 min ea                                 Re-evaluation          Ther Act                                 Modalities                                                  "

## 2023-06-16 ENCOUNTER — OFFICE VISIT (OUTPATIENT)
Dept: PHYSICAL THERAPY | Facility: CLINIC | Age: 78
End: 2023-06-16
Payer: MEDICARE

## 2023-06-16 DIAGNOSIS — M70.61 GREATER TROCHANTERIC BURSITIS OF RIGHT HIP: ICD-10-CM

## 2023-06-16 DIAGNOSIS — M54.50 CHRONIC LOW BACK PAIN, UNSPECIFIED BACK PAIN LATERALITY, UNSPECIFIED WHETHER SCIATICA PRESENT: Primary | ICD-10-CM

## 2023-06-16 DIAGNOSIS — G89.29 CHRONIC LOW BACK PAIN, UNSPECIFIED BACK PAIN LATERALITY, UNSPECIFIED WHETHER SCIATICA PRESENT: Primary | ICD-10-CM

## 2023-06-16 PROCEDURE — 97112 NEUROMUSCULAR REEDUCATION: CPT | Performed by: PHYSICAL THERAPIST

## 2023-06-16 PROCEDURE — 97110 THERAPEUTIC EXERCISES: CPT | Performed by: PHYSICAL THERAPIST

## 2023-06-16 NOTE — PROGRESS NOTES
Daily Note     Today's date: 2023  Patient name: Sachin Varela  : 1945  MRN: [id number]  Referring provider: Patel Sung DO  Dx:   Encounter Diagnosis     ICD-10-CM    1  Chronic low back pain, unspecified back pain laterality, unspecified whether sciatica present  M54 50     G89 29       2  Greater trochanteric bursitis of right hip  M70 61           Start Time: 1010  Stop Time: 1053  Total time in clinic (min): 43 minutes    Subjective: Patient reports she feels okay this morning  She has no residual soreness from PT yesterday  She has a little bit of pain in her low back today but none in her hips  Objective: See treatment diary below    Assessment: Patient had pain with bridges today so these were deferred  She tolerated other strengthening well, and HEP was printed and reviewed to allow for improved carryover between sessions  Patient will perform strength exercises once at home on the weekends as she is coming twice a week to PT  Plan: Continue per plan of care  Diagnosis: R hip pain GTPS, LBP   Precautions:    Primary Goals: 1) weakness in hips  2) weakness in core  3) lumbar ROM   *asterisks by exercise = given for HEP   Manuals 6/15 6/16 5/30 6/1 6   IASTM/STM R glute and ITB                                        There Ex        TM 5 min TM 2 0 mph 5 min TM 2 0 mph 5 min TM 5 min TM RPE 3/10 5 min TM RPE 4/10   3 way ball rolls 5 sec x10 ea  5 sec x 10 ea 5 sec x 10 ea  5 sec x 10 ea   LTR*  5 sec x 10   5 sec x 10   Piriformis S* 3x30 sec uriel     5 sec x 10   Figure 4 S*        FF in chair*        ITB S off of table        Pelvic tilts sitting    NV X 10 reps   SKTC    NV    FF in chair    NV X 10 reps   Standing extensions    NV X 10 against table gentle                   Neuro Re-Ed        TA*   With supine Pball press 5 sec x20      Kegel*        Mult with amps*   10x10 sec      Mult press with MB        Clam*  GTB 2x10 uriel YTB 2x10   YTB 3x10 uriel   Bridge  p!  YTB 2x10 Reverse clam*  2x10 YTB YTB pillow bet knees 3x10 uriel      Standing 3 way SLR  X 10 ea uriel      SLR   2x10 uriel toes pointed up      SLR abd        Standing bird dogs    NV    Mult walk outs* Blk TB 10x fwd  Black TB x 5 ea sideways  NV 5# sidesteps x5 ea - challenge and near LOB   Prone alt arm and leg raises x10 arms/leg opp uriel    NV X 10 arms, legs, uriel   Tband rows/ext* BlTB 2x10 ea  Black TB 2x10 ea      Wobble board  1 min ea  1 min ea                               Re-evaluation          Ther Act                                 Modalities

## 2023-06-21 ENCOUNTER — OFFICE VISIT (OUTPATIENT)
Dept: PHYSICAL THERAPY | Facility: CLINIC | Age: 78
End: 2023-06-21
Payer: MEDICARE

## 2023-06-21 DIAGNOSIS — G89.29 CHRONIC LOW BACK PAIN, UNSPECIFIED BACK PAIN LATERALITY, UNSPECIFIED WHETHER SCIATICA PRESENT: Primary | ICD-10-CM

## 2023-06-21 DIAGNOSIS — M70.61 GREATER TROCHANTERIC BURSITIS OF RIGHT HIP: ICD-10-CM

## 2023-06-21 DIAGNOSIS — M54.50 CHRONIC LOW BACK PAIN, UNSPECIFIED BACK PAIN LATERALITY, UNSPECIFIED WHETHER SCIATICA PRESENT: Primary | ICD-10-CM

## 2023-06-21 PROCEDURE — 97112 NEUROMUSCULAR REEDUCATION: CPT

## 2023-06-21 PROCEDURE — 97110 THERAPEUTIC EXERCISES: CPT

## 2023-06-21 NOTE — PROGRESS NOTES
Daily Note     Today's date: 2023  Patient name: Suzanne Hart  : 1945  MRN: [id number]  Referring provider: Aster Dias DO  Dx:   Encounter Diagnosis     ICD-10-CM    1  Chronic low back pain, unspecified back pain laterality, unspecified whether sciatica present  M54 50     G89 29       2  Greater trochanteric bursitis of right hip  M70 61           Start Time: 1130  Stop Time: 1213  Total time in clinic (min): 43 minutes    Subjective: Patient states she is feeling okay  She had a busy weekend and was not able to do her exercises  Objective: See treatment diary below      Assessment: Continued with outlined program  Patient was able to resume bridges as noted with good tolerance  Patient exhibits good technique with resisted SL clamshells  She demonstrates improved stability and control with forward walk outs  She still has some difficulty, but this is slowly improving  Patient does require more UE support with M/L wobble board  Patient is slowly progressing towards goals  Plan: Progress treatment as tolerated         Diagnosis: R hip pain GTPS, LBP   Precautions:    Primary Goals: 1) weakness in hips  2) weakness in core  3) lumbar ROM   *asterisks by exercise = given for HEP   Manuals 6/15 6/16 6/21 6/1 6   IASTM/STM R glute and ITB                                        There Ex        TM 5 min TM 2 0 mph 5 min TM 2 0 mph 5 min TM 2 0 mph tolerance  5 min TM RPE 3/10 5 min TM RPE 4/10   3 way ball rolls 5 sec x10 ea  5 sec x 10 ea 5 sec x10 ea   5 sec x 10 ea   LTR*  5 sec x 10   5 sec x 10   Piriformis S* 3x30 sec uriel     5 sec x 10   Figure 4 S*        FF in chair*        ITB S off of table        Pelvic tilts sitting    NV X 10 reps   SKTC    NV    FF in chair    NV X 10 reps   Standing extensions    NV X 10 against table gentle                   Neuro Re-Ed        TA*        Kegel*        Mult with amps*        Mult press with MB        Clam*  GTB 2x10 uriel GTB 2x10 uriel   YTB 3x10 uriel   Bridge  p!  GTB 3 sec x10      Reverse clam*  2x10 YTB      Standing 3 way SLR  X 10 ea uriel      SLR        SLR abd        Standing bird dogs   10x uriel  NV    Mult walk outs* Blk TB 10x fwd  Black TB x 5 ea sideways Blk Tband 10x fwd    Sideways 5x ea  NV 5# sidesteps x5 ea - challenge and near LOB   Prone alt arm and leg raises x10 arms/leg opp uriel    NV X 10 arms, legs, uriel   Tband rows/ext* BlTB 2x10 ea  Black TB 2x10 ea Blk TB 2x10 ea      Wobble board  1 min ea  1 min ea 1 min ea                               Re-evaluation          Ther Act                                 Modalities

## 2023-06-23 ENCOUNTER — OFFICE VISIT (OUTPATIENT)
Dept: PHYSICAL THERAPY | Facility: CLINIC | Age: 78
End: 2023-06-23
Payer: MEDICARE

## 2023-06-23 DIAGNOSIS — M54.50 CHRONIC LOW BACK PAIN, UNSPECIFIED BACK PAIN LATERALITY, UNSPECIFIED WHETHER SCIATICA PRESENT: Primary | ICD-10-CM

## 2023-06-23 DIAGNOSIS — M70.61 GREATER TROCHANTERIC BURSITIS OF RIGHT HIP: ICD-10-CM

## 2023-06-23 DIAGNOSIS — G89.29 CHRONIC LOW BACK PAIN, UNSPECIFIED BACK PAIN LATERALITY, UNSPECIFIED WHETHER SCIATICA PRESENT: Primary | ICD-10-CM

## 2023-06-23 PROCEDURE — 97112 NEUROMUSCULAR REEDUCATION: CPT | Performed by: PHYSICAL THERAPIST

## 2023-06-23 PROCEDURE — 97110 THERAPEUTIC EXERCISES: CPT | Performed by: PHYSICAL THERAPIST

## 2023-06-23 NOTE — PROGRESS NOTES
Daily Note     Today's date: 2023  Patient name: Enoc Renee  : 1945  MRN: [id number]  Referring provider: Varsha Balderrama DO  Dx:   Encounter Diagnosis     ICD-10-CM    1  Chronic low back pain, unspecified back pain laterality, unspecified whether sciatica present  M54 50     G89 29       2  Greater trochanteric bursitis of right hip  M70 61           Start Time: 1040  Stop Time: 1125  Total time in clinic (min): 45 minutes    Subjective: Patient notes holding the stretch for her hip for 30 seconds was too long and aggravated the L hip  Objective: See treatment diary below    Assessment: Patient educated about 90 second total hold time for stretches that can be split up in any way to improve comfort  Trialed 15 sec holds today instead of 30 with  improvement in tolerance and no hip pain  Patient tolerated progressions to core and back strengthening well today  Plan: Continue per plan of care  Anticipate DC at the end of next week  Diagnosis: R hip pain GTPS, LBP   Precautions:    Primary Goals: 1) weakness in hips  2) weakness in core  3) lumbar ROM   *asterisks by exercise = given for HEP   Manuals 6/15 6/16 6/21 6/23 6   IASTM/STM R glute and ITB                                        There Ex        TM 5 min TM 2 0 mph 5 min TM 2 0 mph 5 min TM 2 0 mph tolerance  5 min TM 2 0 mph 5 min TM RPE 4/10   3 way ball rolls 5 sec x10 ea  5 sec x 10 ea 5 sec x10 ea  5 sec x 10 5 sec x 10 ea   LTR*  5 sec x 10   5 sec x 10   Piriformis S* 3x30 sec uriel    15 sec x 5 uriel 5 sec x 10   Figure 4 S*        FF in chair*        ITB S off of table        Pelvic tilts sitting     X 10 reps   SKTC        FF in chair     X 10 reps   Standing extensions     X 10 against table gentle                   Neuro Re-Ed        TA*        Kegel*        Mult with amps*        Mult press with SEDRICK Nayak*  GTB 2x10 uriel GTB 2x10 uriel  GTB 2x10 uriel  YTB 3x10 uriel   Bridge  p!  GTB 3 sec x10  GTB 3 sec 2x10 uriel Reverse clam*  2x10 YTB  2x10 YTB    Standing 3 way SLR  X 10 ea uriel      SLR        SLR abd        Standing bird dogs   10x uriel  10x uriel    Mult walk outs* Blk TB 10x fwd  Black TB x 5 ea sideways Blk Tband 10x fwd    Sideways 5x ea  [place] 6# sidesteps x 5 ea 5# sidesteps x5 ea - challenge and near LOB   Prone alt arm and leg raises x10 arms/leg opp uriel     X 10 arms, legs, uriel   Tband rows/ext* BlTB 2x10 ea  Black TB 2x10 ea Blk TB 2x10 ea  [place] 9# 2x10 row, 8 5# ext stagger stance    Wobble board  1 min ea  1 min ea 1 min ea                               Re-evaluation          Ther Act                                 Modalities

## 2023-06-28 ENCOUNTER — OFFICE VISIT (OUTPATIENT)
Dept: PHYSICAL THERAPY | Facility: CLINIC | Age: 78
End: 2023-06-28
Payer: MEDICARE

## 2023-06-28 DIAGNOSIS — G89.29 CHRONIC LOW BACK PAIN, UNSPECIFIED BACK PAIN LATERALITY, UNSPECIFIED WHETHER SCIATICA PRESENT: Primary | ICD-10-CM

## 2023-06-28 DIAGNOSIS — M54.50 CHRONIC LOW BACK PAIN, UNSPECIFIED BACK PAIN LATERALITY, UNSPECIFIED WHETHER SCIATICA PRESENT: Primary | ICD-10-CM

## 2023-06-28 DIAGNOSIS — M70.61 GREATER TROCHANTERIC BURSITIS OF RIGHT HIP: ICD-10-CM

## 2023-06-28 PROCEDURE — 97112 NEUROMUSCULAR REEDUCATION: CPT

## 2023-06-28 PROCEDURE — 97110 THERAPEUTIC EXERCISES: CPT

## 2023-06-28 NOTE — PROGRESS NOTES
Daily Note     Today's date: 2023  Patient name: Tal Maldonado  : 1945  MRN: [id number]  Referring provider: Josette Castaneda DO  Dx:   Encounter Diagnosis     ICD-10-CM    1  Chronic low back pain, unspecified back pain laterality, unspecified whether sciatica present  M54 50     G89 29       2  Greater trochanteric bursitis of right hip  M70 61           Start Time: 1045  Stop Time: 1127  Total time in clinic (min): 42 minutes    Subjective: Patient states she woke up with a little back pain this morning  She states she has not done anything for this to happen  She states she was able to stretch and this helped  Objective: See treatment diary below      Assessment: Continued with outlined program  Patient was able to tolerate stretches to begin session with good tolerance  Patient progressed with reps for hip strengthenging with noticeable fatigue, but no increase in pain symptoms  Added resisted side stepping at mirror with some soreness  She remains challenged with stability on dynamic surfaces  She is making steady progress towards goals  Plan: Potential d/c nv        Diagnosis: R hip pain GTPS, LBP   Precautions:    Primary Goals: 1) weakness in hips  2) weakness in core  3) lumbar ROM   *asterisks by exercise = given for HEP   Manuals 6/15 6/16 6/21 6/23 6/28   IASTM/STM R glute and ITB                                        There Ex        TM 5 min TM 2 0 mph 5 min TM 2 0 mph 5 min TM 2 0 mph tolerance  5 min TM 2 0 mph 5 min TM 2 0 mph   3 way ball rolls 5 sec x10 ea  5 sec x 10 ea 5 sec x10 ea  5 sec x 10 5 sec x10    LTR*  5 sec x 10      Piriformis S* 3x30 sec uriel    15 sec x 5 uriel 15 sec x5 uriel    Figure 4 S*        FF in chair*        ITB S off of table        Pelvic tilts sitting        SKTC        FF in chair        Standing extensions                        Neuro Re-Ed        TA*        Kegel*        Mult with amps*        Mult press with MB        Malini*  GTB 2x10 uriel GTB 2x10 uriel  GTB 2x10 uriel  GTB 3x10 uriel    Bridge  p!  GTB 3 sec x10  GTB 3 sec 2x10 uriel  GTB 3 sec x20    Reverse clam*  2x10 YTB  2x10 YTB YTB 2x10    Standing 3 way SLR  X 10 ea uriel      SLR        SLR abd        Standing bird dogs   10x uriel  10x uriel    Mult walk outs* Blk TB 10x fwd  Black TB x 5 ea sideways Blk Tband 10x fwd    Sideways 5x ea  [place] 6# sidesteps x 5 ea Jessica 8# forward 10x    [place] 7# 5x ea sideways     Prone alt arm and leg raises x10 arms/leg opp uriel        Tband rows/ext* BlTB 2x10 ea  Black TB 2x10 ea Blk TB 2x10 ea  [place] 9# 2x10 row, 8 5# ext stagger stance    Wobble board  1 min ea  1 min ea 1 min ea   1:30 min ea    Side stepping at mirror      YTB 3 laps at [facility]                     Re-evaluation          Ther Act                              Modalities

## 2023-06-30 ENCOUNTER — EVALUATION (OUTPATIENT)
Dept: PHYSICAL THERAPY | Facility: CLINIC | Age: 78
End: 2023-06-30
Payer: MEDICARE

## 2023-06-30 DIAGNOSIS — M54.50 CHRONIC LOW BACK PAIN, UNSPECIFIED BACK PAIN LATERALITY, UNSPECIFIED WHETHER SCIATICA PRESENT: Primary | ICD-10-CM

## 2023-06-30 DIAGNOSIS — G89.29 CHRONIC LOW BACK PAIN, UNSPECIFIED BACK PAIN LATERALITY, UNSPECIFIED WHETHER SCIATICA PRESENT: Primary | ICD-10-CM

## 2023-06-30 DIAGNOSIS — M70.61 GREATER TROCHANTERIC BURSITIS OF RIGHT HIP: ICD-10-CM

## 2023-06-30 PROCEDURE — 97112 NEUROMUSCULAR REEDUCATION: CPT | Performed by: PHYSICAL THERAPIST

## 2023-06-30 NOTE — PROGRESS NOTES
PT Discharge    Today's date: 2023  Patient name: Bárbara Hickey  : 1945  MRN: [id number]  Referring provider: Bee Rivera DO  Dx:   Encounter Diagnosis     ICD-10-CM    1  Chronic low back pain, unspecified back pain laterality, unspecified whether sciatica present  M54 50     G89 29       2  Greater trochanteric bursitis of right hip  M70 61           Start Time: 1045  Stop Time: 1123  Total time in clinic (min): 38 minutes    Assessment  Assessment details: Bárbara iHckey has been compliant with attending physical therapy and completing home exercise program since initial evaluation  Arthur Serrano  has made improvements in objective data since initial evalulation and has achieved all goals  Patient reports having returned to their prior level of function  Patient provided with updated Home Exercise Program, all questions answered, and verbalized understanding, agreeing to plan of care   Thus it was mutually decided to discontinue this episode of care and transition to Home Exercise Program      Impairments: abnormal or restricted ROM, impaired physical strength and pain with function  Understanding of Dx/Px/POC: good   Prognosis: good    Goals  Impairment Goals 4-6 weeks  - Decrease pain to <3/10 - partially met  - Improve lumbar AROM to >75% throughout - partially met  - Increase hip strength to 4+/5 throughout - partially met  - Increase core strength to be able to sit straight up without deviation - partially met    Functional Goals 6-8 weeks  - Return to Prior Level of Function - met  - Patient will be independent with HEP - met  - Patient will be able to walk without increased pain/compensation/difficulty - met  - Patient will be able to mow her lawn without increased pain/compensation/difficulty  - met  - Patient will be able to pull weeds without increased pain/compensation/difficulty  - met  - Patient will be able to go up and down the stairs with proper mechanics and no pain - met      Plan  Planned therapy interventions: home exercise program  Treatment plan discussed with: patient        Subjective Evaluation    History of Present Illness  Mechanism of injury: WORK/SCHOOL: retired  HOME LIFE: First floor set up with basement for laundry and TV room  Lives alone at home with cat  HOBBIES/EXERCISE: yard work  PLOF: none  HISTORY OF CURRENT INJURY:  Patient started having pain 4 weeks ago  She does not remember doing anything differently  She had leg and hip pain on the R side  She noticed pain in her low back with prolonged walking and standing  Patient denies numbness/tingling  She reports pain has not traveled into the leg for a few weeks now  She is very independent and wishes to continue being independent  Update: Patient reports that her R hip is fine  She does still have back pain  She has the most pain in the morning  She finds that if she stretches first before bending this helps with the pain  PAIN LOCATION/DESCRIPTORS: Pain no longer in hips but in center of low back  Pain at sacrum and up to L1, worst at the bottom of the spine  AGGRAVATING FACTORS:  bending, pulling weeds bent over  Improved: mowing the lawn, stairs, getting out of a chair, kneeling to pull weeds, stairs, pulling the hose  EASES: rest with feet up, movement  DAY PATTERN: no pattern  IMAGING: x-ray   Marked levoscoliosis  Severe multilevel degenerative changes in the lumbar  spine  SPECIAL QUESTIONS:    Hai Marcano denies a new onset of Bladder incontinence, Bowel dysfunction, Constant night pain, Tingling and Numbness  Hx of skin CA  PELVIC FLOOR: Do you have pelvic pain or chronic constipation? none  PATIENT GOALS: Patient wishes to be able to get rid of her aches and pains so she can function   - 85% improved at this time  Pain  Current pain ratin  At best pain ratin  At worst pain ratin  Location: low back  Quality: dull ache and discomfort  Progression: improved    Patient Goals  Patient goals for therapy: decreased pain, increased motion, increased strength, independence with ADLs/IADLs and return to sport/leisure activities          Objective     Active Range of Motion     Lumbar   Flexion: 100 degrees  WFL  Extension: 30 degrees  Restriction level: moderate  Left lateral flexion:  with pain Restriction level: moderate  Right lateral flexion:  WFL  Left rotation:  WFL  Right rotation:  [facility]    Additional Active Range of Motion Details  Soreness in low back with L lateral flexion    Strength/Myotome Testing     Left Hip   Planes of Motion   Flexion: 4  Extension: 4-  Abduction: 4-  External rotation: 4+  Internal rotation: 4+    Right Hip   Planes of Motion   Flexion: 4+  Extension: 4-  Abduction: 4-  External rotation: 4+  Internal rotation: 4+    Left Knee   Flexion: 4+  Extension: 4+    Right Knee   Flexion: 4+  Extension: 4+    Left Ankle/Foot   Dorsiflexion: 4+    Right Ankle/Foot   Dorsiflexion: 4+    Tests     Lumbar     Left   Negative crossed SLR, passive SLR and slump test      Right   Negative crossed SLR, passive SLR and slump test      Left Pelvic Girdle/Sacrum   Negative: thigh thrust      Right Pelvic Girdle/Sacrum   Negative: thigh thrust      Left Hip   Negative ZAK and FADIR  Right Hip   Negative ZAK and FADIR       Additional Tests Details  PROM uriel hips WNL  No longer TTP of R GT and hip ER    Lumbar PA hypomobile and painful lower lumbar  TTP of L glute                  Diagnosis: R hip pain GTPS, LBP   Precautions:    Primary Goals: 1) weakness in hips  2) weakness in core  3) lumbar ROM   *asterisks by exercise = given for HEP   Manuals 6/30 6/16 6/21 6/23 6/28   IASTM/STM R glute and ITB                                        There Ex        TM 5 min TM 2 0 mph 5 min TM 2 0 mph 5 min TM 2 0 mph tolerance  5 min TM 2 0 mph 5 min TM 2 0 mph   3 way ball rolls  5 sec x 10 ea 5 sec x10 ea  5 sec x 10 5 sec x10    LTR*  5 sec x 10      Piriformis S*    15 sec x 5 uriel 15 sec x5 uriel    Figure 4 S*        FF in chair*        ITB S off of table        Pelvic tilts sitting        SKTC        FF in chair        Standing extensions                        Neuro Re-Ed        TA*        Kegel*        Mult with amps*        Mult press with MB        Clam*  GTB 2x10 uriel GTB 2x10 uriel  GTB 2x10 uriel  GTB 3x10 uriel    Bridge  p!  GTB 3 sec x10  GTB 3 sec 2x10 uriel  GTB 3 sec x20    Reverse clam*  2x10 YTB  2x10 YTB YTB 2x10    Standing 3 way SLR  X 10 ea uriel      SLR        SLR abd        Standing bird dogs   10x uriel  10x uriel    Mult walk outs*  Black TB x 5 ea sideways Blk Tband 10x fwd    Sideways 5x ea  [place] 6# sidesteps x 5 ea Jessica 8# forward 10x    [place] 7# 5x ea sideways     Prone alt arm and leg raises        Tband rows/ext*  Black TB 2x10 ea Blk TB 2x10 ea  [place] 9# 2x10 row, 8 5# ext stagger stance    Wobble board   1 min ea 1 min ea   1:30 min ea    Side stepping at mirror      YTB 3 laps at mirror                     Re-evaluation IM, PT         Ther Act                              Modalities

## 2023-08-17 ENCOUNTER — OFFICE VISIT (OUTPATIENT)
Age: 78
End: 2023-08-17
Payer: MEDICARE

## 2023-08-17 VITALS — HEIGHT: 63 IN | WEIGHT: 131.4 LBS | BODY MASS INDEX: 23.28 KG/M2 | TEMPERATURE: 98.3 F

## 2023-08-17 DIAGNOSIS — L71.9 ROSACEA: ICD-10-CM

## 2023-08-17 DIAGNOSIS — L57.0 KERATOSIS, ACTINIC: Primary | ICD-10-CM

## 2023-08-17 PROCEDURE — 17003 DESTRUCT PREMALG LES 2-14: CPT | Performed by: DERMATOLOGY

## 2023-08-17 PROCEDURE — 17000 DESTRUCT PREMALG LESION: CPT | Performed by: DERMATOLOGY

## 2023-08-17 PROCEDURE — 99214 OFFICE O/P EST MOD 30 MIN: CPT | Performed by: DERMATOLOGY

## 2023-08-17 RX ORDER — ACETAMINOPHEN 160 MG
2000 TABLET,DISINTEGRATING ORAL DAILY
COMMUNITY
Start: 2022-08-15

## 2023-08-17 NOTE — PROGRESS NOTES
[facility] Note     Patient Name: Marianne Edge  Encounter Date: 8/17/23     Have you been cared for by a [facility] in the last 3 years and, if so, which description applies to you? Yes. I have been here within the last 3 years, and my medical history has NOT changed since that time. I am FEMALE/of child-bearing potential.    REVIEW OF SYSTEMS:  Have you recently had or currently have any of the following? · No changes in my recent health. PAST MEDICAL HISTORY:  Have you personally ever had or currently have any of the following? If "YES," then please provide more detail. · No changes in my medical history. FAMILY HISTORY:  Any "first degree relatives" (parent, brother, sister, or child) with the following? • No changes in my family's known health. PATIENT EXPERIENCE:    • Do you want the Dermatologist to perform a COMPLETE skin exam today including a clinical examination under the "bra and underwear" areas? NO  • If necessary, do we have your permission to call and leave a detailed message on your Preferred Phone number that includes your specific medical information?   Yes      No Known Allergies   Current Outpatient Medications:   •  alendronate (FOSAMAX) 70 mg tablet, , Disp: , Rfl:   •  aspirin (ECOTRIN LOW STRENGTH) 81 mg EC tablet, Take 81 mg by mouth every evening 3 x per week, Disp: , Rfl:   •  Chelated Zinc 50 MG TABS, 50 mg, Disp: , Rfl:   •  Cholecalciferol (Vitamin D3) 50 MCG (2000 UT) capsule, Take 2,000 Units by mouth in the morning, Disp: , Rfl:   •  doxycycline (PERIOSTAT) 20 MG tablet, Take 20 mg by mouth daily, Disp: , Rfl:   •  ezetimibe-simvastatin (VYTORIN) 10-20 mg per tablet, Take 1 tablet by mouth daily at bedtime, Disp: , Rfl:   •  famotidine (PEPCID) 20 mg tablet, Take 20 mg by mouth daily, Disp: , Rfl:   •  Fluocinolone Acetonide Scalp 0.01 % OIL, Apply topically 2 (two) times a week Apply to scalp, leave over night, Disp: 118 mL, Rfl: 2  •  Multiple Vitamins-Minerals (CENTRUM SILVER 50+WOMEN PO), Take by mouth daily with breakfast, Disp: , Rfl:   •  VITAMIN E PO, Take 400 Units by mouth in the morning, Disp: , Rfl:   •  ketoconazole (NIZORAL) 2 % shampoo, Apply 1 application topically daily for 30 doses, Disp: 120 mL, Rfl: 3          • Whom besides the patient is providing clinical information about today's encounter?   o NO ADDITIONAL HISTORIAN (patient alone provided history)    Physical Exam and Assessment/Plan by Diagnosis:    ACTINIC KERATOSIS    Physical Exam:  • Anatomic Location Affected:  scalp  • Morphological Description:  Scaly pink plaques  • Pertinent Positives:  • Pertinent Negatives:      Assessment and Plan:  Based on a thorough discussion of this condition and the management approach to it (including a comprehensive discussion of the known risks, side effects and potential benefits of treatment), the patient (family) agrees to implement the following specific plan:  • When outside we recommend using a wide brim hat, sunglasses, long sleeve and pants, sunscreen with SPF 42+ with reapplication every 2 hours, or SPF specific clothing  • Recommend freezing one more time then follow up with 5-FU cream.   • Efudex 5% (fluorouracil) cream: apply topically twice a day for 14 days to scalp. • This medication will cause extreme redness, inflammation, open sores. • You can apply plain Vaseline 20-30 minutes after applying Efudex to help with the redness, inflammation and blistering. • If it is very uncomfortable, please contact Dr. Jeanette Montanez and she may order topical steroids. • Recommending waiting to treat the scalp with the 5-FU cream until after your vacation when able to. • Follow up in 4 months    We will send the prescription over to [facility] which is a compounding pharmacy.  You will receive an E-mail from the company asking for Billing information and home address then medication will be mailed to your home address within 5 business days. PROCEDURE:  DESTRUCTION OF PRE-MALIGNANT LESIONS  After a thorough discussion of treatment options and risk/benefits/alternatives (including but not limited to local pain, scarring, dyspigmentation, blistering, and possible superinfection), verbal and written consent were obtained and the aforementioned lesions were treated on with cryotherapy using liquid nitrogen x 1 cycle for 5-10 seconds. • TOTAL NUMBER of 6 pre-malignant lesions were treated today on the ANATOMIC LOCATION: scalp. The patient tolerated the procedure well, and after-care instructions were provided. ROSACEA    Physical Exam:  • Anatomic Location Affected:  face  • Morphological Description:  Mild erythema a few pustules and milia  • Pertinent Positives:  • Pertinent Negatives: Additional History of Present Condition:  Patient reports dryness and pimples started recently    Assessment and Plan:  Based on a thorough discussion of this condition and the management approach to it (including a comprehensive discussion of the known risks, side effects and potential benefits of treatment), the patient (family) agrees to implement the following specific plan:  • Metronidazole 0.75% cream apply topically 2 times daily. • If topical is not enough will consider going full dose on the Doxycycline if not getting better around fall give the office a call. • Advised if stinging symptoms occurs to also call the office. Rosacea is a chronic rash affecting the mid-face including the nose, cheeks, chin, forehead, and eyelids. The incidence is usually greatest between the ages of 30-60 years and is more common in people with fair skin. Common characteristics include redness, telangiectasias, papules and pustules over affected areas. Rosacea may look similar to acne, but there is a lack of comedones. Occasionally the eyes may also be involved in ocular rosacea.  In advanced disease, enlargement of the sebaceous glands in the nose, termed rhinophyma, may be present. Rosacea results in red spots (papules) and sometimes pustules over the face, but unlike acne there are no blackheads, whiteheads, or cystic nodules. Patients often experience increased facial flushing with prominent blood vessels (erythematotelangiectatic rosacea) and dry, sensitive skin. These symptoms are exacerbated by sun exposure, hot or spicy foods, topical steroids and oil-based facial products. In ocular rosacea, eyelids may be red and sore due to conjunctivitis, keratitis, and episcleritis. If rhinophyma develops due to enlargement of sebaceous glands, the patient may have an enlarged and irregularly shaped nose with prominent pores. In rosacea that is refractory to treatment, patients can develop persistent redness and swelling of the face due to lymphatic obstruction (Morbihan disease). Distribution around the cheeks may be confused with the malar or “butterfly rash” of lupus. However, the rash of lupus spares the nasal creases and lacks papules and pustules. If signs of photosensitivity, oral ulcers, arthritis, and kidney dysfunction are present then consider referral to a rheumatologist.     There are many potential causes of rosacea including genetic, environmental, vascular, and inflammatory factors.  These include, but are not limited to:  • Chronic exposure to ultraviolet radiation   • Increased immune responses in the form of cathelicidins that promote vessel dilation and infiltration with white blood cells (neutrophils) into the dermis  • Increased matrix metalloproteinases such as collagen and elastase that remodel normal tissue may contribute to inflammation of the skin making it thicker and harder  • There is some evidence to suggest that increased numbers of demodex mites on patient skin may contribute to rosacea papules     General Treatment Approach   • Avoid exacerbating factors such as heat, spicy foods, and alcohol   • Use daily SPF30+ sunscreen and other methods of coverage for sun protection  • Use water-based make-up   • Avoid applying topical steroids to affected areas as they can cause perioral dermatitis and exacerbate rosacea     Topical Treatment Approach  • Metronidazole cream or gel by itself or in combination with oral antibiotics for more severe cases  • Azelaic acid cream or lotion is effective for mild inflammatory rosacea when applied twice daily to affected areas  • Brimonidine gel and oxymetazoline hydrochloride cream can reduce facial redness temporarily   • Ivermectin cream can treat papulopustular rosacea by controlling demodex mites and inflammation   • Pimecrolimus cream or tacrolimus ointment twice a day for 2-3 months can help reduce inflammation    Oral Treatment Approach  • Antibiotics such as doxycycline, minocycline, or erythromycin for 1-3 months  • Clonidine and carvedilol can help reduce facial flushing and are generally well tolerated. Common side effects include low blood pressure, gastrointestinal upset, dry eyes, blurred vision and low heart rate. • Isotretinoin at low doses can be effective for long term treatment when antibiotics fail. Side effects may make it unsuitable for some patients. • NSAIDs such as diclofenac can help reduce discomfort and redness in the skin.      Procedural/Surgical Treatment Approach   • Vascular lasers or intense pulsed light treatment may be used to treat persistent telangiectasia and papulopustular rosacea  • Plastic surgery and carbon dioxide lasers may be used to treat rhinophyma     Scribe Attestation    I,:  Gwen Degroot am acting as a scribe while in the presence of the attending physician.:       I,:  Roxann Saravia MD personally performed the services described in this documentation    as scribed in my presence.:

## 2023-08-17 NOTE — PATIENT INSTRUCTIONS
ACTINIC KERATOSIS    Assessment and Plan:  Based on a thorough discussion of this condition and the management approach to it (including a comprehensive discussion of the known risks, side effects and potential benefits of treatment), the patient (family) agrees to implement the following specific plan:  When outside we recommend using a wide brim hat, sunglasses, long sleeve and pants, sunscreen with SPF 65+ with reapplication every 2 hours, or SPF specific clothing  Recommend freezing one more time then follow up with 5-FU cream.   Efudex 5% (fluorouracil) cream: apply topically twice a day for 14 days to scalp. This medication will cause extreme redness, inflammation, open sores. You can apply plain Vaseline 20-30 minutes after applying Efudex to help with the redness, inflammation and blistering. If it is very uncomfortable, please contact Dr. Juanita Ramos and she may order topical steroids. Recommending waiting to treat the scalp with the 5-FU cream until after your vacation when able to. Follow up in 4 months    We will send the prescription over to [facility] which is [facility]. You will receive an E-mail from the company asking for Billing information and home address then medication will be mailed to your home address within 5 business days. PROCEDURE:  DESTRUCTION OF PRE-MALIGNANT LESIONS  After a thorough discussion of treatment options and risk/benefits/alternatives (including but not limited to local pain, scarring, dyspigmentation, blistering, and possible superinfection), verbal and written consent were obtained and the aforementioned lesions were treated on with cryotherapy using liquid nitrogen x 1 cycle for 5-10 seconds. The patient tolerated the procedure well, and after-care instructions were provided.       ROSACEA    Assessment and Plan:  Based on a thorough discussion of this condition and the management approach to it (including a comprehensive discussion of the known risks, side effects and potential benefits of treatment), the patient (family) agrees to implement the following specific plan:  Metronidazole 0.75% cream apply topically 2 times daily. If topical is not enough will consider going full dose on the Doxycycline if not getting better around fall give the office a call. Advised if stinging symptoms occurs to also call the office. Rosacea is a chronic rash affecting the mid-face including the nose, cheeks, chin, forehead, and eyelids. The incidence is usually greatest between the ages of 30-60 years and is more common in people with fair skin. Common characteristics include redness, telangiectasias, papules and pustules over affected areas. Rosacea may look similar to acne, but there is a lack of comedones. Occasionally the eyes may also be involved in ocular rosacea. In advanced disease, enlargement of the sebaceous glands in the nose, termed rhinophyma, may be present. Rosacea results in red spots (papules) and sometimes pustules over the face, but unlike acne there are no blackheads, whiteheads, or cystic nodules. Patients often experience increased facial flushing with prominent blood vessels (erythematotelangiectatic rosacea) and dry, sensitive skin. These symptoms are exacerbated by sun exposure, hot or spicy foods, topical steroids and oil-based facial products. In ocular rosacea, eyelids may be red and sore due to conjunctivitis, keratitis, and episcleritis. If rhinophyma develops due to enlargement of sebaceous glands, the patient may have an enlarged and irregularly shaped nose with prominent pores. In rosacea that is refractory to treatment, patients can develop persistent redness and swelling of the face due to lymphatic obstruction (Morbihan disease). Distribution around the cheeks may be confused with the malar or “butterfly rash” of lupus. However, the rash of lupus spares the nasal creases and lacks papules and pustules.  If signs of photosensitivity, oral ulcers, arthritis, and kidney dysfunction are present then consider referral to a rheumatologist.     There are many potential causes of rosacea including genetic, environmental, vascular, and inflammatory factors. These include, but are not limited to:  Chronic exposure to ultraviolet radiation   Increased immune responses in the form of cathelicidins that promote vessel dilation and infiltration with white blood cells (neutrophils) into the dermis  Increased matrix metalloproteinases such as collagen and elastase that remodel normal tissue may contribute to inflammation of the skin making it thicker and harder  There is some evidence to suggest that increased numbers of demodex mites on patient skin may contribute to rosacea papules     General Treatment Approach   Avoid exacerbating factors such as heat, spicy foods, and alcohol   Use daily SPF30+ sunscreen and other methods of coverage for sun protection  Use water-based make-up   Avoid applying topical steroids to affected areas as they can cause perioral dermatitis and exacerbate rosacea     Topical Treatment Approach  Metronidazole cream or gel by itself or in combination with oral antibiotics for more severe cases  Azelaic acid cream or lotion is effective for mild inflammatory rosacea when applied twice daily to affected areas  Brimonidine gel and oxymetazoline hydrochloride cream can reduce facial redness temporarily   Ivermectin cream can treat papulopustular rosacea by controlling demodex mites and inflammation   Pimecrolimus cream or tacrolimus ointment twice a day for 2-3 months can help reduce inflammation    Oral Treatment Approach  Antibiotics such as doxycycline, minocycline, or erythromycin for 1-3 months  Clonidine and carvedilol can help reduce facial flushing and are generally well tolerated. Common side effects include low blood pressure, gastrointestinal upset, dry eyes, blurred vision and low heart rate.    Isotretinoin at low doses can be effective for long term treatment when antibiotics fail. Side effects may make it unsuitable for some patients. NSAIDs such as diclofenac can help reduce discomfort and redness in the skin.      Procedural/Surgical Treatment Approach   Vascular lasers or intense pulsed light treatment may be used to treat persistent telangiectasia and papulopustular rosacea  Plastic surgery and carbon dioxide lasers may be used to treat rhinophyma

## 2023-08-24 ENCOUNTER — HOSPITAL ENCOUNTER (EMERGENCY)
Facility: HOSPITAL | Age: 78
Discharge: HOME | End: 2023-08-24
Attending: EMERGENCY MEDICINE
Payer: MEDICARE

## 2023-08-24 ENCOUNTER — APPOINTMENT (EMERGENCY)
Dept: RADIOLOGY | Facility: HOSPITAL | Age: 78
End: 2023-08-24
Payer: MEDICARE

## 2023-08-24 VITALS
WEIGHT: 131.39 LBS | HEART RATE: 59 BPM | DIASTOLIC BLOOD PRESSURE: 64 MMHG | RESPIRATION RATE: 16 BRPM | BODY MASS INDEX: 23.65 KG/M2 | OXYGEN SATURATION: 99 % | TEMPERATURE: 97.7 F | SYSTOLIC BLOOD PRESSURE: 148 MMHG

## 2023-08-24 DIAGNOSIS — S90.129A BRUISE OF TOE: Primary | ICD-10-CM

## 2023-08-24 PROCEDURE — 73630 X-RAY EXAM OF FOOT: CPT

## 2023-08-24 PROCEDURE — 99284 EMERGENCY DEPT VISIT MOD MDM: CPT | Performed by: EMERGENCY MEDICINE

## 2023-08-24 PROCEDURE — 99283 EMERGENCY DEPT VISIT LOW MDM: CPT

## 2023-08-24 NOTE — DISCHARGE INSTRUCTIONS
Please return to the emergency department for worsening symptoms including chest pain, shortness of breath, dizziness, lightheadedness, fever greater than 103, severe pain, inability to walk, fainting episodes, etc.. Please follow-up with your family practice provider as soon as possible. Continue wearing hard shoe at home for comfort. I recommend resting, icing, and elevating to help with pain and swelling. Follow-up with Dr. Kristie Dean at your appointment tomorrow.

## 2023-08-24 NOTE — ED PROVIDER NOTES
History  Chief Complaint   Patient presents with   • Foot Pain     Pt reports she was just standing and started getting pain in her left foot (ball of foot/great toe) yesterday. This is an 64-year-old female with no significant past medical history presenting to the emergency department today for atraumatic ecchymosis and swelling to the left great toe. The patient notes she is experiencing pain to the plantar aspect of her left great toe. She notes she was in bed last night and subsequently stood up and began having some swelling and pain to the left great toe. No discernible injuries. No numbness or tingling. Patient is still able to walk. She has an appointment with the podiatrist tomorrow. She has no calf pain, ankle pain, or knee pain. The patient denies other complaints at this time. History provided by:  Patient   used: No    Toe Pain  Location:  Left Great Toe  Severity:  Mild  Onset quality:  Sudden  Duration:  1 day  Timing:  Constant  Progression:  Unchanged  Chronicity:  New  Associated symptoms: no abdominal pain, no chest pain, no congestion, no cough, no diarrhea, no ear pain, no fatigue, no fever, no headaches, no loss of consciousness, no myalgias, no nausea, no rash, no rhinorrhea, no shortness of breath, no sore throat, no vomiting and no wheezing        Prior to Admission Medications   Prescriptions Last Dose Informant Patient Reported? Taking?    Chelated Zinc 50 MG TABS   Yes No   Si mg   Cholecalciferol (Vitamin D3) 50 MCG (2000) capsule   Yes No   Sig: Take 2,000 Units by mouth in the morning   Fluocinolone Acetonide Scalp 0.01 % OIL   No No   Sig: Apply topically 2 (two) times a week Apply to scalp, leave over night   Multiple Vitamins-Minerals (CENTRUM SILVER 50+WOMEN PO)   Yes No   Sig: Take by mouth daily with breakfast   VITAMIN E PO   Yes No   Sig: Take 400 Units by mouth in the morning   alendronate (FOSAMAX) 70 mg tablet   Yes No   aspirin (ECOTRIN LOW STRENGTH) 81 mg EC tablet   Yes No   Sig: Take 81 mg by mouth every evening 3 x per week   doxycycline (PERIOSTAT) 20 MG tablet   Yes No   Sig: Take 20 mg by mouth daily   ezetimibe-simvastatin (VYTORIN) 10-20 mg per tablet   Yes No   Sig: Take 1 tablet by mouth daily at bedtime   famotidine (PEPCID) 20 mg tablet  Self Yes No   Sig: Take 20 mg by mouth daily   ketoconazole (NIZORAL) 2 % shampoo   No No   Sig: Apply 1 application topically daily for 30 doses   metroNIDAZOLE (METROCREAM) 0.75 % cream   No No   Sig: Apply topically 2 (two) times a day      Facility-Administered Medications: None       Past Medical History:   Diagnosis Date   • Actinic keratosis 7/1/2020   • Alopecia 7/1/2020   • Arthritis    • Basal cell carcinoma (BCC) of skin of right lower extremity including hip 7/16/2021    Right anterior thigh, bx 4/23/2021   • Blister (nonthermal), right thigh, initial encounter 4/28/2021   • Cancer ([address])     basal cell back, eyebrow and scalp   • GERD (gastroesophageal reflux disease)    • Hyperlipidemia    • Hypertension    • Milia 10/14/2020   • Murmur, cardiac     sept 2017 echocardiogram, slighty abnormal F/U in Jan 2018   • Neoplasm of skin 7/1/2020    R lateral brachial arm 1.2 x 1.4 cm   • PONV (postoperative nausea and vomiting)    • Sebaceous gland hyperplasia of face 10/14/2020   • Seborrheic dermatitis of scalp 7/1/2020   • 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, right 8/12/2020    Right lateral brachial    • Swelling of both lips 4/12/2021       Past Surgical History:   Procedure Laterality Date   • BASAL CELL CARCINOMA EXCISION     • BREAST SURGERY Bilateral     cyst   • COLONOSCOPY  2013    x1   • DILATION AND CURETTAGE OF UTERUS     • HYSTERECTOMY      RACHEL   • TX XCAPSL CTRC RMVL INSJ IO LENS PROSTH W/O ECP Left 10/2/2017    Procedure: EXTRACTION EXTRACAPSULAR CATARACT PHACO INTRAOCULAR LENS (IOL);   Surgeon: Fransisco Lorenzo MD;  Location: [facility] MAIN OR;  Service: Ophthalmology   • IL XCAPSL CTRC RMVL INSJ IO LENS PROSTH W/O ECP Right 10/23/2017    Procedure: EXTRACTION EXTRACAPSULAR CATARACT PHACO INTRAOCULAR LENS (IOL); Surgeon: Toni Feldman MD;  Location: [facility] MAIN OR;  Service: Ophthalmology   • SKIN BIOPSY      scalp, eyebrow left and midback basal cell       Family History   Problem Relation Age of Onset   • Heart disease Mother         quintuple bypass   • Hip dysplasia Mother    • Cancer Father         non-Hodgkin's lymphoma    • Stroke Father    • Hodgkin's lymphoma Father    • Heart disease Sister         valve issues   • Thyroid disease Daughter    • Diabetes Son    • Heart disease Son    • Kidney disease Son         stones   • Thyroid disease Son      I have reviewed and agree with the history as documented. E-Cigarette/Vaping   • E-Cigarette Use Never User      E-Cigarette/Vaping Substances   • Nicotine No    • THC No    • CBD No    • Flavoring No      Social History     Tobacco Use   • Smoking status: Former     Packs/day: 0.50     Years: 25.00     Total pack years: 12.50     Types: Cigarettes     Quit date: 44     Years since quittin.6   • Smokeless tobacco: Never   • Tobacco comments:     Never smoker - As per [employer] Use   • Vaping Use: Never used   Substance Use Topics   • Alcohol use: Yes     Alcohol/week: 1.0 standard drink of alcohol     Types: 1 Glasses of wine per week     Comment: socially   • Drug use: No       Review of Systems   Constitutional: Negative for appetite change, chills, diaphoresis, fatigue and fever. HENT: Negative for congestion, ear pain, rhinorrhea and sore throat. Eyes: Negative for visual disturbance. Respiratory: Negative for cough, chest tightness, shortness of breath and wheezing. Cardiovascular: Negative for chest pain, palpitations and leg swelling. Gastrointestinal: Negative for abdominal pain, constipation, diarrhea, nausea and vomiting.    Musculoskeletal: Negative for myalgias, neck pain and neck stiffness. Skin: Positive for color change and wound. Negative for rash. Neurological: Negative for dizziness, seizures, loss of consciousness, syncope, weakness, light-headedness, numbness and headaches. Psychiatric/Behavioral: Negative for confusion. All other systems reviewed and are negative. Physical Exam  Physical Exam  Vitals and nursing note reviewed. Constitutional:       General: She is not in acute distress. Appearance: Normal appearance. She is normal weight. She is not ill-appearing, toxic-appearing or diaphoretic. HENT:      Head: Normocephalic and atraumatic. Nose: Nose normal. No congestion or rhinorrhea. Mouth/Throat:      Mouth: Mucous membranes are moist.      Pharynx: No oropharyngeal exudate or posterior oropharyngeal erythema. Eyes:      General: No scleral icterus. Right eye: No discharge. Left eye: No discharge. Extraocular Movements: Extraocular movements intact. Pupils: Pupils are equal, round, and reactive to light. Cardiovascular:      Rate and Rhythm: Normal rate and regular rhythm. Pulses: Normal pulses. Heart sounds: Normal heart sounds. No murmur heard. No friction rub. No gallop. Comments: 2+ DP pulses bilaterally  Pulmonary:      Effort: Pulmonary effort is normal. No respiratory distress. Breath sounds: Normal breath sounds. No stridor. No wheezing, rhonchi or rales. Chest:      Chest wall: No tenderness. Musculoskeletal:         General: Normal range of motion. Cervical back: Normal range of motion. No tenderness. Right lower leg: No edema. Left lower leg: No edema. Comments:  The patient has tenderness to palpation to the base of the left great toe; there is ecchymosis noted without significant subungual hematoma; the patient is still able to dorsiflex her left great toe; she has mild tenderness to palpation throughout the forefoot; left great toe and forefoot are not warm, erythematous, or weeping   Skin:     General: Skin is warm and dry. Capillary Refill: Capillary refill takes less than 2 seconds. Coloration: Skin is not jaundiced or pale. Neurological:      General: No focal deficit present. Mental Status: She is alert and oriented to person, place, and time. Mental status is at baseline. Comments: Normal sensation to the distal aspect of the left lower extremity   Psychiatric:         Mood and Affect: Mood normal.         Behavior: Behavior normal.         Vital Signs  ED Triage Vitals [08/24/23 0913]   Temperature Pulse Respirations Blood Pressure SpO2   97.7 °F (36.5 °C) 59 16 148/64 99 %      Temp Source Heart Rate Source Patient Position - Orthostatic VS BP Location FiO2 (%)   Oral Monitor -- -- --      Pain Score       4           Vitals:    08/24/23 0913   BP: 148/64   Pulse: 59         Visual Acuity      ED Medications  Medications - No data to display    Diagnostic Studies  Results Reviewed     None                 XR foot 3+ views LEFT   Final Result by Carmen Jeffries MD (08/24 1011)      No acute fracture or dislocation. Resident: Magnus Domingo, the attending radiologist, have reviewed the images and agree with the final report above. Workstation performed: [id number]                    Procedures  Procedures         ED Course                               SBIRT 20yo+    Flowsheet Row Most Recent Value   Initial Alcohol Screen: US AUDIT-C     1. How often do you have a drink containing alcohol? 3 Filed at: 08/24/2023 0915   2. How many drinks containing alcohol do you have on a typical day you are drinking? 0 Filed at: 08/24/2023 0915   3b. FEMALE Any Age, or MALE 65+: How often do you have 4 or more drinks on one occassion? 0 Filed at: 08/24/2023 0915   Audit-C Score 3 Filed at: 08/24/2023 5204   ODILIA: How many times in the past year have you. .. Used an illegal drug or used a prescription medication for non-medical reasons? Never Filed at: 08/24/2023 1993                    Medical Decision Making  This is an 78year-old female presenting to the emergency department today for left great toe pain. It is atraumatic. She is still able to walk. Vital signs are stable. Not on anticoagulants. On physical examination, the patient has ecchymosis to the left great toe. She has some pain throughout the forefoot of the left lower extremity. She has normal range of motion to dorsiflexion of the left great toe. I see no acute osseous abnormalities on the x-ray of the left great toe. The patient is stable for discharge at this time. The patient was placed in a hard shoe while here in the emergency department. Follow-up at podiatry appointment tomorrow. RICE. Strict return precautions were given. Recommend PCP follow-up as soon as possible. The patient and/or patient's proxy verify their understanding and agree to the plan at this time. All questions answered to the patient and/or their proxy's satisfaction. All labs reviewed and utilized in the medical decision making process (if labs were ordered). Portions of the record may have been created with voice recognition software.  Occasional wrong word or "sound a like" substitutions may have occurred due to the inherent limitations of voice recognition software.  Read the chart carefully and recognize, using context, where substitutions have occurred. Bruise of toe: complicated acute illness or injury  Amount and/or Complexity of Data Reviewed  Radiology: ordered and independent interpretation performed. Decision-making details documented in ED Course.      Details: XR Left Foot          Disposition  Final diagnoses:   Traumatic ecchymosis of toe of left foot, initial encounter     Time reflects when diagnosis was documented in both MDM as applicable and the Disposition within this note     Time User Action Codes Description Comment    8/24/2023  9:56 AM Patricia Patten Husbands Add [I23.858F] Traumatic ecchymosis of toe of left foot, initial encounter       ED Disposition     ED Disposition   Discharge    Condition   Stable    Date/Time   Thu Aug 24, 2023 [address] discharge to home/self care.                Follow-up Information     Follow up With Specialties Details Why Contact Info Additional Information    Cally Murray MD Family Medicine Schedule an appointment as soon as possible for a visit   [address] [place] [place] [id number] [place] [address] [address] [facility] Emergency Department Emergency Medicine Go to  If symptoms worsen [address] [id number]  [address] [facility] Emergency Department, [facility] [address], [address] [facility]    DAQUAN Thornton [facility] Podiatry Call   [address] [facility]  [phone]             Discharge Medication List as of 8/24/2023  9:57 AM      CONTINUE these medications which have NOT CHANGED    Details   alendronate (FOSAMAX) 70 mg tablet Starting Wed 1/5/2022, Historical Med      aspirin (ECOTRIN LOW STRENGTH) 81 mg EC tablet Take 81 mg by mouth every evening 3 x per week, Historical Med      Chelated Zinc 50 MG TABS 50 mg, Starting Mon 8/15/2022, Historical Med      Cholecalciferol (Vitamin D3) 50 MCG (2000 UT) capsule Take 2,000 Units by mouth in the morning, Starting Mon 8/15/2022, Historical Med      doxycycline (PERIOSTAT) 20 MG tablet Take 20 mg by mouth daily, Historical Med      ezetimibe-simvastatin (VYTORIN) 10-20 mg per tablet Take 1 tablet by mouth daily at bedtime, Historical Med      famotidine (PEPCID) 20 mg tablet Take 20 mg by mouth daily, Historical Med      Fluocinolone Acetonide Scalp 0.01 % OIL Apply topically 2 (two) times a week Apply to scalp, leave over night, Starting Thu 1/20/2022, Normal      ketoconazole (NIZORAL) 2 % shampoo Apply 1 application topically daily for 30 doses, Starting Mon 11/28/2022, Until Wed 12/28/2022, Normal metroNIDAZOLE (METROCREAM) 0.75 % cream Apply topically 2 (two) times a day, Starting Thu 8/17/2023, Normal      Multiple Vitamins-Minerals (CENTRUM SILVER 50+WOMEN PO) Take by mouth daily with breakfast, Historical Med      VITAMIN E PO Take 400 Units by mouth in the morning, Historical Med             No discharge procedures on file.     PDMP Review     None          ED Provider  Electronically Signed by           William Espinoza PA-C  08/24/23 6702

## 2023-08-25 ENCOUNTER — OFFICE VISIT (OUTPATIENT)
Dept: PODIATRY | Facility: CLINIC | Age: 78
End: 2023-08-25
Payer: MEDICARE

## 2023-08-25 VITALS
DIASTOLIC BLOOD PRESSURE: 64 MMHG | WEIGHT: 131 LBS | HEIGHT: 63 IN | BODY MASS INDEX: 23.21 KG/M2 | RESPIRATION RATE: 17 BRPM | SYSTOLIC BLOOD PRESSURE: 148 MMHG

## 2023-08-25 DIAGNOSIS — S90.32XA CONTUSION OF LEFT FOOT, INITIAL ENCOUNTER: Primary | ICD-10-CM

## 2023-08-25 DIAGNOSIS — M79.672 LEFT FOOT PAIN: ICD-10-CM

## 2023-08-25 PROCEDURE — 99213 OFFICE O/P EST LOW 20 MIN: CPT | Performed by: PODIATRIST

## 2023-08-25 NOTE — PROGRESS NOTES
Assessment/Plan: Contusion left foot. Left foot pain. Plan. Chart reviewed. X-rays reviewed. Patient examined. Patient has no history of occult trauma however she has ecchymosis and pain within the left foot. All digits are rectus. At this time we will treat for bruising. Patient is on aspirin. She will start Voltaren gel. Return as needed. Diagnoses and all orders for this visit:    Contusion of left foot, initial encounter    Left foot pain          Subjective: Patient is seen on referral from urgent care. Patient woke up 1 morning and noticed her foot was black and blue and had some pain. No history of occult trauma. Patient is on aspirin.     No Known Allergies      Current Outpatient Medications:   •  alendronate (FOSAMAX) 70 mg tablet, , Disp: , Rfl:   •  aspirin (ECOTRIN LOW STRENGTH) 81 mg EC tablet, Take 81 mg by mouth every evening 3 x per week, Disp: , Rfl:   •  Chelated Zinc 50 MG TABS, 50 mg, Disp: , Rfl:   •  Cholecalciferol (Vitamin D3) 50 MCG (2000 UT) capsule, Take 2,000 Units by mouth in the morning, Disp: , Rfl:   •  doxycycline (PERIOSTAT) 20 MG tablet, Take 20 mg by mouth daily, Disp: , Rfl:   •  ezetimibe-simvastatin (VYTORIN) 10-20 mg per tablet, Take 1 tablet by mouth daily at bedtime, Disp: , Rfl:   •  famotidine (PEPCID) 20 mg tablet, Take 20 mg by mouth daily, Disp: , Rfl:   •  Fluocinolone Acetonide Scalp 0.01 % OIL, Apply topically 2 (two) times a week Apply to scalp, leave over night, Disp: 118 mL, Rfl: 2  •  ketoconazole (NIZORAL) 2 % shampoo, Apply 1 application topically daily for 30 doses, Disp: 120 mL, Rfl: 3  •  metroNIDAZOLE (METROCREAM) 0.75 % cream, Apply topically 2 (two) times a day, Disp: 45 g, Rfl: 1  •  Multiple Vitamins-Minerals (CENTRUM SILVER 50+WOMEN PO), Take by mouth daily with breakfast, Disp: , Rfl:   •  VITAMIN E PO, Take 400 Units by mouth in the morning, Disp: , Rfl:     Patient Active Problem List   Diagnosis   • Acute pain of left knee • Cancer ([address])   • GERD (gastroesophageal reflux disease)   • Hyperlipidemia   • Hypertension   • Murmur, cardiac   • PONV (postoperative nausea and vomiting)   • Arthritis   • Neoplasm of skin   • Alopecia   • Seborrheic dermatitis of scalp   • Actinic keratosis   • 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, right   • Sebaceous gland hyperplasia of face   • Milia   • Swelling of both lips   • Blister (nonthermal), right thigh, initial encounter   • Basal cell carcinoma (BCC) of skin of right lower extremity including hip   • COVID-19          Patient ID: Fredrick Anderson is a 78 y.o. female. HPI    The following portions of the patient's history were reviewed and updated as appropriate:     family history includes Cancer in her father; Diabetes in her son; Heart disease in her mother, sister, and son; Hip dysplasia in her mother; Hodgkin's lymphoma in her father; Kidney disease in her son; Stroke in her father; Thyroid disease in her daughter and son. reports that she quit smoking about 34 years ago. Her smoking use included cigarettes. She has a 12.50 pack-year smoking history. She has never used smokeless tobacco. She reports current alcohol use of about 1.0 standard drink of alcohol per week. She reports that she does not use drugs. Vitals:    08/25/23 1405   BP: 148/64   Resp: 17       Review of Systems      Objective:  Patient's shoes and socks removed. Foot ExamPhysical Exam       Physical Exam  Constitutional:       General: She is not in acute distress.     Appearance: She is well-developed. She is not ill-appearing, toxic-appearing or diaphoretic. HENT:      Head: Normocephalic.    Cardiovascular:      Pulses:           Dorsalis pedis pulses are 1+ on the right side and 1+ on the left side.        Posterior tibial pulses are 0 on the right side and 0 on the left side.      Comments: Palpable DP pulse, nonpalpable PT pulse, CFT is less than 3 seconds, temperature gradient within normal limit, a trophic skin changes noted with skin thinning in shiny, patient report occasional claudication to bilateral calf, localized edema foot and ankle Q9  Pulmonary:      Effort: Pulmonary effort is normal.   Musculoskeletal:         General: Tenderness and deformity present.      Comments: Pes planus type foot pain on palpation and range of motion of the 1st MPJ, metatarsal heads bilateral foot, pain on palpation on range of motion of the ST joint, crepitus noted in midfoot joint.      Left foot demonstrates significant edema and ecchymosis of the forefoot. All 5 digits demonstrate this. All digits are rectus. There is some pain with palpation third MPJ. X-ray of hospital demonstrates no evidence of fracture or dislocation. May need to rule out stress fracture.       Skin:     General: Skin is dry.      Capillary Refill: Capillary refill takes 2 to 3 seconds.      Comments: Trophic skin changes bilateral foot and ankle, alternating hypopigmentation and hyperpigmentation patches around the foot and ankle on anterior leg, skin is shiny and thin, absent hair growth, atrophy of fat pad.       Neurological:      Mental Status: She is alert and oriented to person, place, and time.      Sensory: Sensory deficit present.      Motor: Atrophy present.   Gato Favor Tendon Reflexes: Reflexes abnormal.      Comments: Protective sensation diminished bilateral lower extremity no focal motor deficit     Foot Exam     Right Foot/Ankle      Neurovascular  Dorsalis pedis: 1+  Posterior tibial: 0        Left Foot/Ankle       Neurovascular  Dorsalis pedis: 1+  Posterior tibial: 0

## 2023-09-14 ENCOUNTER — OFFICE VISIT (OUTPATIENT)
Dept: PODIATRY | Facility: CLINIC | Age: 78
End: 2023-09-14
Payer: MEDICARE

## 2023-09-14 VITALS
RESPIRATION RATE: 17 BRPM | WEIGHT: 131 LBS | HEIGHT: 63 IN | DIASTOLIC BLOOD PRESSURE: 64 MMHG | SYSTOLIC BLOOD PRESSURE: 148 MMHG | BODY MASS INDEX: 23.21 KG/M2

## 2023-09-14 DIAGNOSIS — M25.572 ARTHRALGIA OF LEFT FOOT: Primary | ICD-10-CM

## 2023-09-14 DIAGNOSIS — M79.672 LEFT FOOT PAIN: ICD-10-CM

## 2023-09-14 DIAGNOSIS — M21.962 ACQUIRED DEFORMITY OF LEFT FOOT: ICD-10-CM

## 2023-09-14 PROCEDURE — 20605 DRAIN/INJ JOINT/BURSA W/O US: CPT | Performed by: PODIATRIST

## 2023-09-14 NOTE — PROGRESS NOTES
Assessment/Plan: Arthralgia left first MPJ. Pain. Pain upon ambulation. Plan. Chart reviewed. Patient examined. Patient advised on condition. Today arthrocentesis done. 1 cc Kenalog 10 injected into left first MPJ without pain or complication. Patient advised on aftercare. Return as needed         Diagnoses and all orders for this visit:    Arthralgia of left foot    Left foot pain    Acquired deformity of left foot          Subjective: Patient is having pain in her left big toe joint.   No history of trauma    No Known Allergies      Current Outpatient Medications:   •  alendronate (FOSAMAX) 70 mg tablet, , Disp: , Rfl:   •  aspirin (ECOTRIN LOW STRENGTH) 81 mg EC tablet, Take 81 mg by mouth every evening 3 x per week, Disp: , Rfl:   •  Chelated Zinc 50 MG TABS, 50 mg, Disp: , Rfl:   •  Cholecalciferol (Vitamin D3) 50 MCG (2000 UT) capsule, Take 2,000 Units by mouth in the morning, Disp: , Rfl:   •  doxycycline (PERIOSTAT) 20 MG tablet, Take 20 mg by mouth daily, Disp: , Rfl:   •  ezetimibe-simvastatin (VYTORIN) 10-20 mg per tablet, Take 1 tablet by mouth daily at bedtime, Disp: , Rfl:   •  famotidine (PEPCID) 20 mg tablet, Take 20 mg by mouth daily, Disp: , Rfl:   •  Fluocinolone Acetonide Scalp 0.01 % OIL, Apply topically 2 (two) times a week Apply to scalp, leave over night, Disp: 118 mL, Rfl: 2  •  ketoconazole (NIZORAL) 2 % shampoo, Apply 1 application topically daily for 30 doses, Disp: 120 mL, Rfl: 3  •  metroNIDAZOLE (METROCREAM) 0.75 % cream, Apply topically 2 (two) times a day, Disp: 45 g, Rfl: 1  •  Multiple Vitamins-Minerals (CENTRUM SILVER 50+WOMEN PO), Take by mouth daily with breakfast, Disp: , Rfl:   •  VITAMIN E PO, Take 400 Units by mouth in the morning, Disp: , Rfl:     Patient Active Problem List   Diagnosis   • Acute pain of left knee   • Cancer (HCC)   • GERD (gastroesophageal reflux disease)   • Hyperlipidemia   • Hypertension   • Murmur, cardiac   • PONV (postoperative nausea and vomiting)   • Arthritis   • Neoplasm of skin   • Alopecia   • Seborrheic dermatitis of scalp   • Actinic keratosis   • 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 female. HPI    The following portions of the patient's history were reviewed and updated as appropriate:     family history includes Cancer in her father; Diabetes in her son; Heart disease in her mother, sister, and son; Hip dysplasia in her mother; Hodgkin's lymphoma in her father; Kidney disease in her son; Stroke in her father; Thyroid disease in her daughter and son. reports that she quit smoking about 34 years ago. Her smoking use included cigarettes. She has a 12.50 pack-year smoking history. She has never used smokeless tobacco. She reports current alcohol use of about 1.0 standard drink of alcohol per week. She reports that she does not use drugs. Vitals:    09/14/23 1335   BP: 148/64   Resp: 17       Review of Systems      Objective:  Patient's shoes and socks removed. Foot ExamPhysical Exam       Physical Exam  Constitutional:       General: She is not in acute distress.     Appearance: She is well-developed. She is not ill-appearing, toxic-appearing or diaphoretic. HENT:      Head: Normocephalic.    Cardiovascular:      Pulses:           Dorsalis pedis pulses are 1+ on the right side and 1+ on the left side.        Posterior tibial pulses are 0 on the right side and 0 on the left side.      Comments: Palpable DP pulse, nonpalpable PT pulse, CFT is less than 3 seconds, temperature gradient within normal limit, a trophic skin changes noted with skin thinning in shiny, patient report occasional claudication to bilateral calf, localized edema foot and ankle Q9  Pulmonary:      Effort: Pulmonary effort is normal.   Musculoskeletal:         General: Tenderness and deformity present.      Comments: Pes planus type foot pain on palpation and range of motion of the 1st MPJ, metatarsal heads bilateral foot, pain on palpation on range of motion of the ST joint, crepitus noted in midfoot joint.      Left foot demonstrates osteophytes of the left first MPJ.   Decreased range of motion noted.        Skin:     General: Skin is dry.      Capillary Refill: Capillary refill takes 2 to 3 seconds.      Comments: Trophic skin changes bilateral foot and ankle, alternating hypopigmentation and hyperpigmentation patches around the foot and ankle on anterior leg, skin is shiny and thin, absent hair growth, atrophy of fat pad.       Neurological:      Mental Status: She is alert and oriented to person, place, and time.      Sensory: Sensory deficit present.      Motor: Atrophy present.   Radha Freud Tendon Reflexes: Reflexes abnormal.      Comments: Protective sensation diminished bilateral lower extremity no focal motor deficit     Foot Exam     Right Foot/Ankle      Neurovascular  Dorsalis pedis: 1+  Posterior tibial: 0        Left Foot/Ankle       Neurovascular  Dorsalis pedis: 1+  Posterior tibial: 0

## 2023-10-12 ENCOUNTER — TELEPHONE (OUTPATIENT)
Age: 78
End: 2023-10-12

## 2023-10-12 NOTE — TELEPHONE ENCOUNTER
Patient concerned because was advised that the areas treated with 5 fluorouracil will become red, scabby and nothing is happening . Therapy started on 10/01  She wants an advise from Dr. Karel Howe .

## 2023-12-19 ENCOUNTER — OFFICE VISIT (OUTPATIENT)
Age: 78
End: 2023-12-19
Payer: MEDICARE

## 2023-12-19 VITALS — HEIGHT: 63 IN | WEIGHT: 134.2 LBS | TEMPERATURE: 97.7 F | BODY MASS INDEX: 23.78 KG/M2

## 2023-12-19 DIAGNOSIS — L65.9 ALOPECIA: ICD-10-CM

## 2023-12-19 DIAGNOSIS — L57.0 ACTINIC KERATOSIS: ICD-10-CM

## 2023-12-19 PROCEDURE — 17000 DESTRUCT PREMALG LESION: CPT | Performed by: DERMATOLOGY

## 2023-12-19 PROCEDURE — 99214 OFFICE O/P EST MOD 30 MIN: CPT | Performed by: DERMATOLOGY

## 2023-12-19 PROCEDURE — 17003 DESTRUCT PREMALG LES 2-14: CPT | Performed by: DERMATOLOGY

## 2023-12-19 RX ORDER — LYSINE 500 MG
TABLET ORAL
COMMUNITY

## 2023-12-19 NOTE — PATIENT INSTRUCTIONS
ACTINIC KERATOSIS    Assessment and Plan:  Based on a thorough discussion of this condition and the management approach to it (including a comprehensive discussion of the known risks, side effects and potential benefits of treatment), the patient (family) agrees to implement the following specific plan:  When outside we recommend using a wide brim hat, sunglasses, long sleeve and pants, sunscreen with SPF 30+ with reapplication every 2 hours, or SPF specific clothing   liquid nitrogen to treat areas. Consent obtained. Expect area to blister, crust, and then fall off within 2 weeks. Please use vaseline.     Follow up in 3 months.                                      PROCEDURE:  DESTRUCTION OF PRE-MALIGNANT LESIONS  After a thorough discussion of treatment options and risk/benefits/alternatives (including but not limited to local pain, scarring, dyspigmentation, blistering, and possible superinfection), verbal and written consent were obtained and the aforementioned lesions were treated on with cryotherapy using liquid nitrogen x 1 cycle for 5-10 seconds.    TOTAL NUMBER of 6 pre-malignant lesions were treated today on the ANATOMIC LOCATION: scalp.     The patient tolerated the procedure well, and after-care instructions were provided.      ANDROGENIC ALOPECIA      Assessment and Plan:  Based on a thorough discussion of this condition and the management approach to it (including a comprehensive discussion of the known risks, side effects and potential benefits of treatment), the patient (family) agrees to implement the following specific plan:  Summary of discussion of treatment option:  Over The Counter:  1. Topical Rogaine or Minoxidil - twice a day, comes in foam or liquid  2. Laser hair cap (capillus) - red light that stimulates hair growth  3. Saw Palmetto by itself or with Nutrafol (more expensive but has additional ingredients that may make it more effective than saw palmetto alone)  Prescriptions  Medication:  1. Finasteride  2. Spironolactone  3. Oral Minoxidil (ROGAINE)  Procedures:  1. Platelet Rich Plasma injections, this is not covered by insurance, it consists of 3 treatments 1 month apart and a 4th 3 to  6 months later.  2. Hair transplantation, also  not covered by insurance.       What is androgenic alopecia?     Androgenic alopecia is a common form of hair loss in both men and women. This form of hair loss affects an estimated 50 million men and 30 million women in the [place]. Androgenetic alopecia can start as early as a person's teens and risk increases with age; more than 50 percent of men over age 50 have some degree of hair loss. In women, hair loss is most likely after menopause.  There are both genetic and environmental factors that contribute to androgenic alopecia. Many of these factors remain unknown. Researchers have determined that this form of hair loss is related to hormones called androgens.   Dihydrotestoerone is particularly implicated   Increased androgen levels in hair follicles lead to shorter hair growth cycles and thinner strands of hair. There is also a delay in growth of new hair to replace shed strands.   Suspected gene involvement includes the AR gene that makes a protein called androgen receptor. The androgen receptors allow the body to repond to dihydrotestoerone and other androgens.   The vast majority of women affected by female pattern hair loss do not have underlying hormonal abnormalities, so other causes such as thyroid function must also be explored.     The inheritance pattern of androgenic alopecia is unclear, but the condition tends to cluster in families. Having a close relative with patterned hair loss seems to be a risk factor for development of the condition.      Androgenic alopecia in men has been associated with several other medical conditions including:   Coronary heart disease and enlargement of the prostate.   Prostate cancer, disorders of  "insulin resistance (such as diabetes and obesity), and high blood pressure (hypertension) have been linked to androgenic alopecia.   In women, this form of hair loss is associated with an increased risk of  (PCOS). PCOS is characterized by a hormonal imbalance that can lead to irregular menstruation, acne, excess hair elsewhere on the body (hirsutism), and weight gain.     What are the symptoms of androgenic alopecia in men and women?     In men, this condition is also known as male-pattern baldness. Hair is lost in a typical pattern, beginning above both temples. Over time, the hairline recedes to form a characteristic \"M\" shape. Hair also thins at the crown (near the top of the head), often progressing to partial or complete baldness.     The pattern of hair loss in women differs from male-pattern baldness. In women, the hair becomes thinner all over the head, and the hairline does not recede. Thinning hair occurs on the mid-frontal area of the scalp and is generally less severe than occurs in males, rarely resulting in total baldness.      How do we diagnose androgenic alopecia?     A careful history often suggests the underlying cause of alopecia. Crucial factors include the duration and pattern of hair loss, whether the hair is broken or shed at the roots, and whether shedding or thinning has increased. The patient's diet, medications, present and past medical conditions, and family history of alopecia are other important factors.     The “pull test” is an easy technique for assessing hair loss. Approximately 60 hairs are grasped between the thumb and the index and middle fingers. The hairs are then gently but firmly pulled. A negative test (six or fewer hairs obtained) indicates normal shedding, whereas a positive test (more than six hairs obtained) indicates a process of active hair shedding. Patients should not shampoo their hair 24 hours before the test is performed.      If the diagnosis is not clear based " on the history and physical examination, selected laboratory tests and, occasionally, punch biopsy may be indicated. Common lab tests include testosterone levels, thyroid function tests, VDRL to rule out syphilis, ferritin levels for iron deficiency, and IVA test for lupus.      How do we treat androgenic alopecia?     The main goal of treatment is to slow or stop the progression of hair loss rather than promote hair regrowth. Results are variable and it is not possible to predict who may benefit from treatment. Options for treatment include:   2% minoxidil solution. This medication is applied with a dropper onto dry scale twice daily. Hands should be washed thoroughly to avoid inadvertent application to other parts of the body. It is a pregnancy category C drug and is not recommended for children younger than 18 years of age.   Side effects include excessive hair growth, especially above the eyebrows and over the cheeks. It usually disappears after a year even with continued use of minoxidil  Tretinoin (Retin-A) applied to the scalp as an adjunct to minoxidil has shown some benefits.  Women  Estrogen therapy such as birth control pills that contain the least amount of progestin (Ortho-Cyclen, Ortho Tri-Cyclen, Ovcon 35, Mircette, Demulen, Zovia)  Spironolactone (Aldactone) which is a weak inhibitor of androgen binding to androgen receptors. This is an off-label use of the drug.  Men  Finasteride (Proscar) which inhibits the production of dihydrotestosterone.     Other options to improve cosmetic appearance can include wearing wigs and hair transplantation

## 2023-12-19 NOTE — PROGRESS NOTES
"[facility]atology [facility] Note     Patient Name: Liz Jin  Encounter Date: 12/19/23     Have you been cared for by a [facility] Dermatologist in the last 3 years and, if so, which description applies to you?    Yes.  I have been here within the last 3 years, and my medical history has NOT changed since that time.  I am FEMALE/of child-bearing potential.    REVIEW OF SYSTEMS:  Have you recently had or currently have any of the following? No changes in my recent health.   PAST MEDICAL HISTORY:  Have you personally ever had or currently have any of the following?  If \"YES,\" then please provide more detail. No changes in my medical history.   HISTORY OF IMMUNOSUPPRESSION: Do you have a history of any of the following:  Systemic Immunosuppression such as Diabetes, Biologic or Immunotherapy, Chemotherapy, Organ Transplantation, Bone Marrow Transplantation?  No     Answering \"YES\" requires the addition of the dotphrase \"IMMUNOSUPPRESSED\" as the first diagnosis of the patient's visit.   FAMILY HISTORY:  Any \"first degree relatives\" (parent, brother, sister, or child) with the following?    No changes in my family's known health.   PATIENT EXPERIENCE:    Do you want the Dermatologist to perform a COMPLETE skin exam today including a clinical examination under the \"bra and underwear\" areas?  NO  If necessary, do we have your permission to call and leave a detailed message on your Preferred Phone number that includes your specific medical information?  Yes      No Known Allergies   Current Outpatient Medications:   •  alendronate (FOSAMAX) 70 mg tablet, , Disp: , Rfl:   •  aspirin (ECOTRIN LOW STRENGTH) 81 mg EC tablet, Take 81 mg by mouth every evening 3 x per week, Disp: , Rfl:   •  Chelated Zinc 50 MG TABS, 50 mg, Disp: , Rfl:   •  Cholecalciferol (Vitamin D3) 25 MCG (1000 UT) CAPS, Take 1 capsule every day by oral route., Disp: , Rfl:   •  Cholecalciferol (Vitamin D3) 50 MCG (2000 UT) capsule, Take 2,000 Units by " mouth in the morning, Disp: , Rfl:   •  doxycycline (PERIOSTAT) 20 MG tablet, Take 20 mg by mouth daily, Disp: , Rfl:   •  ezetimibe-simvastatin (VYTORIN) 10-20 mg per tablet, Take 1 tablet by mouth daily at bedtime, Disp: , Rfl:   •  famotidine (PEPCID) 20 mg tablet, Take 20 mg by mouth daily, Disp: , Rfl:   •  Fluocinolone Acetonide Scalp 0.01 % OIL, Apply topically 2 (two) times a week Apply to scalp, leave over night, Disp: 118 mL, Rfl: 2  •  L-Lysine 500 MG TABS, Take 1 tablet every day by oral route., Disp: , Rfl:   •  metroNIDAZOLE (METROCREAM) 0.75 % cream, Apply topically 2 (two) times a day, Disp: 45 g, Rfl: 1  •  Multiple Vitamins-Minerals (CENTRUM SILVER 50+WOMEN PO), Take by mouth daily with breakfast, Disp: , Rfl:   •  VITAMIN E PO, Take 400 Units by mouth in the morning, Disp: , Rfl:   •  ketoconazole (NIZORAL) 2 % shampoo, Apply 1 application topically daily for 30 doses, Disp: 120 mL, Rfl: 3          Whom besides the patient is providing clinical information about today's encounter?   NO ADDITIONAL HISTORIAN (patient alone provided history)    Physical Exam and Assessment/Plan by Diagnosis:    ACTINIC KERATOSIS    Physical Exam:  Anatomic Location Affected:  scalp  Morphological Description:  Scaly pink papules  Pertinent Positives:  Pertinent Negatives:      Assessment and Plan:  Based on a thorough discussion of this condition and the management approach to it (including a comprehensive discussion of the known risks, side effects and potential benefits of treatment), the patient (family) agrees to implement the following specific plan:  When outside we recommend using a wide brim hat, sunglasses, long sleeve and pants, sunscreen with SPF 30+ with reapplication every 2 hours, or SPF specific clothing   liquid nitrogen to treat areas. Consent obtained. Expect area to blister, crust, and then fall off within 2 weeks. Please use vaseline.     Follow up in 3 months.                                       PROCEDURE:  DESTRUCTION OF PRE-MALIGNANT LESIONS  After a thorough discussion of treatment options and risk/benefits/alternatives (including but not limited to local pain, scarring, dyspigmentation, blistering, and possible superinfection), verbal and written consent were obtained and the aforementioned lesions were treated on with cryotherapy using liquid nitrogen x 1 cycle for 5-10 seconds.    TOTAL NUMBER of 6 pre-malignant lesions were treated today on the ANATOMIC LOCATION: scalp.     The patient tolerated the procedure well, and after-care instructions were provided.      ANDROGENIC ALOPECIA     Physical Exam:  Anatomic Location Affected:  scalp  Morphological Description:  hair miniaturization primarily affecting the frontal scalp and extending to vertex  Pertinent Positives:  Pertinent Negatives:     Additional History of Present Condition:       Assessment and Plan:  Based on a thorough discussion of this condition and the management approach to it (including a comprehensive discussion of the known risks, side effects and potential benefits of treatment), the patient (family) agrees to implement the following specific plan:  Summary of discussion of treatment option:  Over The Counter:  1. Topical Rogaine or Minoxidil - twice a day, comes in foam or liquid  2. Laser hair cap (capillus) - red light that stimulates hair growth  3. Saw Palmetto by itself or with Nutrafol (more expensive but has additional ingredients that may make it more effective than saw palmetto alone)  Prescriptions Medication:  1. Finasteride  2. Spironolactone  3. Oral Minoxidil (ROGAINE)  Procedures:  1. Platelet Rich Plasma injections, this is not covered by insurance, it consists of 3 treatments 1 month apart and a 4th 3 to  6 months later.  2. Hair transplantation, also  not covered by insurance.       What is androgenic alopecia?     Androgenic alopecia is a common form of hair loss in both men and women. This form of hair loss  affects an estimated 50 million men and 30 million women in the [place]. Androgenetic alopecia can start as early as a person's teens and risk increases with age; more than 50 percent of men over age 50 have some degree of hair loss. In women, hair loss is most likely after menopause.  There are both genetic and environmental factors that contribute to androgenic alopecia. Many of these factors remain unknown. Researchers have determined that this form of hair loss is related to hormones called androgens.   Dihydrotestoerone is particularly implicated   Increased androgen levels in hair follicles lead to shorter hair growth cycles and thinner strands of hair. There is also a delay in growth of new hair to replace shed strands.   Suspected gene involvement includes the AR gene that makes a protein called androgen receptor. The androgen receptors allow the body to repond to dihydrotestoerone and other androgens.   The vast majority of women affected by female pattern hair loss do not have underlying hormonal abnormalities, so other causes such as thyroid function must also be explored.     The inheritance pattern of androgenic alopecia is unclear, but the condition tends to cluster in families. Having a close relative with patterned hair loss seems to be a risk factor for development of the condition.      Androgenic alopecia in men has been associated with several other medical conditions including:   Coronary heart disease and enlargement of the prostate.   Prostate cancer, disorders of insulin resistance (such as diabetes and obesity), and high blood pressure (hypertension) have been linked to androgenic alopecia.   In women, this form of hair loss is associated with an increased risk of  (PCOS). PCOS is characterized by a hormonal imbalance that can lead to irregular menstruation, acne, excess hair elsewhere on the body (hirsutism), and weight gain.     What are the symptoms of androgenic alopecia in men and  "women?     In men, this condition is also known as male-pattern baldness. Hair is lost in a typical pattern, beginning above both temples. Over time, the hairline recedes to form a characteristic \"M\" shape. Hair also thins at the crown (near the top of the head), often progressing to partial or complete baldness.     The pattern of hair loss in women differs from male-pattern baldness. In women, the hair becomes thinner all over the head, and the hairline does not recede. Thinning hair occurs on the mid-frontal area of the scalp and is generally less severe than occurs in males, rarely resulting in total baldness.      How do we diagnose androgenic alopecia?     A careful history often suggests the underlying cause of alopecia. Crucial factors include the duration and pattern of hair loss, whether the hair is broken or shed at the roots, and whether shedding or thinning has increased. The patient's diet, medications, present and past medical conditions, and family history of alopecia are other important factors.     The “pull test” is an easy technique for assessing hair loss. Approximately 60 hairs are grasped between the thumb and the index and middle fingers. The hairs are then gently but firmly pulled. A negative test (six or fewer hairs obtained) indicates normal shedding, whereas a positive test (more than six hairs obtained) indicates a process of active hair shedding. Patients should not shampoo their hair 24 hours before the test is performed.      If the diagnosis is not clear based on the history and physical examination, selected laboratory tests and, occasionally, punch biopsy may be indicated. Common lab tests include testosterone levels, thyroid function tests, VDRL to rule out syphilis, ferritin levels for iron deficiency, and IVA test for lupus.      How do we treat androgenic alopecia?     The main goal of treatment is to slow or stop the progression of hair loss rather than promote hair regrowth. " Results are variable and it is not possible to predict who may benefit from treatment. Options for treatment include:   2% minoxidil solution. This medication is applied with a dropper onto dry scale twice daily. Hands should be washed thoroughly to avoid inadvertent application to other parts of the body. It is a pregnancy category C drug and is not recommended for children younger than 18 years of age.   Side effects include excessive hair growth, especially above the eyebrows and over the cheeks. It usually disappears after a year even with continued use of minoxidil  Tretinoin (Retin-A) applied to the scalp as an adjunct to minoxidil has shown some benefits.  Women  Estrogen therapy such as birth control pills that contain the least amount of progestin (Ortho-Cyclen, Ortho Tri-Cyclen, Ovcon 35, Mircette, Demulen, Zovia)  Spironolactone (Aldactone) which is a weak inhibitor of androgen binding to androgen receptors. This is an off-label use of the drug.  Men  Finasteride (Proscar) which inhibits the production of dihydrotestosterone.     Other options to improve cosmetic appearance can include wearing wigs and hair transplantation      Scribe Attestation    I,:  Cece Nettles am acting as a scribe while in the presence of the attending physician.:       I,:  Eva Ritchie MD personally performed the services described in this documentation    as scribed in my presence.:

## 2024-01-02 ENCOUNTER — TELEPHONE (OUTPATIENT)
Age: 79
End: 2024-01-02

## 2024-01-02 NOTE — TELEPHONE ENCOUNTER
Kim from Dr. Gonzalez's office is calling to see if office can fax over pts records from her office visits with Dr. Boone. Fax number is [phone]. If theres any questions, their call back number is [phone]. Thanks.

## 2024-04-01 ENCOUNTER — TELEPHONE (OUTPATIENT)
Age: 79
End: 2024-04-01

## 2024-04-01 NOTE — TELEPHONE ENCOUNTER
Called pt to cancel appt as provider has accommodations and  no available appts available right now. Pt added to wait list and pt said she is going to look around as she can't wait.

## 2025-02-24 ENCOUNTER — OFFICE VISIT (OUTPATIENT)
Age: 80
End: 2025-02-24
Payer: MEDICARE

## 2025-02-24 ENCOUNTER — COSMETIC (OUTPATIENT)
Age: 80
End: 2025-02-24

## 2025-02-24 VITALS — BODY MASS INDEX: 23.6 KG/M2 | HEIGHT: 61 IN | WEIGHT: 125 LBS

## 2025-02-24 DIAGNOSIS — L64.9 ANDROGENETIC ALOPECIA: ICD-10-CM

## 2025-02-24 DIAGNOSIS — L82.1 SEBORRHEIC KERATOSIS: ICD-10-CM

## 2025-02-24 DIAGNOSIS — D22.70 MULTIPLE BENIGN MELANOCYTIC NEVI OF UPPER EXTREMITY, LOWER EXTREMITY, AND TRUNK: ICD-10-CM

## 2025-02-24 DIAGNOSIS — L71.9 ROSACEA: Primary | ICD-10-CM

## 2025-02-24 DIAGNOSIS — Z85.828 HISTORY OF BASAL CELL CARCINOMA: ICD-10-CM

## 2025-02-24 DIAGNOSIS — D22.60 MULTIPLE BENIGN MELANOCYTIC NEVI OF UPPER EXTREMITY, LOWER EXTREMITY, AND TRUNK: ICD-10-CM

## 2025-02-24 DIAGNOSIS — L81.4 LENTIGO: ICD-10-CM

## 2025-02-24 DIAGNOSIS — Z85.89 HISTORY OF SQUAMOUS CELL CARCINOMA: ICD-10-CM

## 2025-02-24 DIAGNOSIS — Z41.1 ENCOUNTER FOR COSMETIC PROCEDURE: Primary | ICD-10-CM

## 2025-02-24 DIAGNOSIS — D18.01 CHERRY ANGIOMA: ICD-10-CM

## 2025-02-24 DIAGNOSIS — D22.5 MULTIPLE BENIGN MELANOCYTIC NEVI OF UPPER EXTREMITY, LOWER EXTREMITY, AND TRUNK: ICD-10-CM

## 2025-02-24 PROCEDURE — 99214 OFFICE O/P EST MOD 30 MIN: CPT | Performed by: DERMATOLOGY

## 2025-02-24 PROCEDURE — 17000 DESTRUCT PREMALG LESION: CPT | Performed by: DERMATOLOGY

## 2025-02-24 PROCEDURE — 17003 DESTRUCT PREMALG LES 2-14: CPT | Performed by: DERMATOLOGY

## 2025-02-24 RX ORDER — AZELAIC ACID 0.2 G/G
CREAM CUTANEOUS 2 TIMES DAILY
Qty: 30 G | Refills: 5 | Status: SHIPPED | OUTPATIENT
Start: 2025-02-24 | End: 2025-03-03 | Stop reason: SDUPTHER

## 2025-02-24 NOTE — PROGRESS NOTES
COSMETIC VISIT     Patient Name: Liz Jin  Encounter Date: 2/24/2025         ACROCHORDON (SKIN TAG)    Physical Exam:  Anatomic Location:  chest  Morphologic Description:  Skin-colored to slightly hyperpigmented, pedunculated papules   Pertinent Positives:  Bleeding? No  Pertinent Negatives:    Additional History of Present Condition:  patient reports that she would skin tag on chest cosmetically removed, patient ok' $20 fee.     Plan:  We discussed the benign nature of the condition. Skin tags (acrochordons) form when the body produces extra cells in the skin's top layers. They tend to form in skin folds and where the skin rubs against itself.  We discussed skin tag removal, including risks and benefits.  Acrochordon (skin tag) destruction performed in the office (See Procedure Note).       PROCEDURES PERFORMED TODAY ASSOCIATED WITH THIS CONDITION:          Acrochordon Destruction:   PROCEDURE:  DESTRUCTION OF ACROCHORDONS    We again discussed methods of removal including that any procedural treatment may not be covered by insurance and would then be the patient's responsibility:       Location: chest     Description: tan colored papule     Number of Lesions Treated: 1     Treatment of lesions chosen:  cryotherapy     Anesthesia:  none     Postop care reviewed and discussed: it was recommended to keep area clean with soap and water and keep area clean           Medical Complexity:    SELF-LIMITED OR MINOR PROBLEM.  Problem runs a definite and prescribed course, is transient in nature, and is not likely to permanently alter health status.        Do Not Bill Insurance; Patient Paid $20 Out Of Pocket Cosmetic Fee      Scribe Attestation    I,:  Liz Garza MA am acting as a scribe while in the presence of the attending physician.:       I,:  Eva Ritchie MD personally performed the services described in this documentation    as scribed in my presence.:

## 2025-02-24 NOTE — PROGRESS NOTES
"[facility] Note     Patient Name: Liz Jin  Encounter Date: 2/24/2025     Have you been cared for by a [facility] Dermatologist in the last 3 years and, if so, which description applies to you?    Yes.  I have been here within the last 3 years, and my medical history has NOT changed since that time.  I am FEMALE/of child-bearing potential.    REVIEW OF SYSTEMS:  Have you recently had or currently have any of the following? No changes in my recent health.   PAST MEDICAL HISTORY:  Have you personally ever had or currently have any of the following?  If \"YES,\" then please provide more detail. No changes in my medical history.   HISTORY OF IMMUNOSUPPRESSION: Do you have a history of any of the following:  Systemic Immunosuppression such as Diabetes, Biologic or Immunotherapy, Chemotherapy, Organ Transplantation, Bone Marrow Transplantation or Prednisone?  No     Answering \"YES\" requires the addition of the dotphrase \"IMMUNOSUPPRESSED\" as the first diagnosis of the patient's visit.   FAMILY HISTORY:  Any \"first degree relatives\" (parent, brother, sister, or child) with the following?    No changes in my family's known health.         No Known Allergies   Current Outpatient Medications:   •  alendronate (FOSAMAX) 70 mg tablet, , Disp: , Rfl:   •  aspirin (ECOTRIN LOW STRENGTH) 81 mg EC tablet, Take 81 mg by mouth every evening 3 x per week, Disp: , Rfl:   •  azelaic acid (Azelex) 20 % cream, Apply topically 2 (two) times a day, Disp: 30 g, Rfl: 5  •  Chelated Zinc 50 MG TABS, 50 mg, Disp: , Rfl:   •  Cholecalciferol (Vitamin D3) 25 MCG (1000 UT) CAPS, Take 1 capsule every day by oral route., Disp: , Rfl:   •  doxycycline (PERIOSTAT) 20 MG tablet, Take 20 mg by mouth daily, Disp: , Rfl:   •  ezetimibe-simvastatin (VYTORIN) 10-20 mg per tablet, Take 1 tablet by mouth daily at bedtime, Disp: , Rfl:   •  famotidine (PEPCID) 20 mg tablet, Take 20 mg by mouth daily, Disp: , Rfl:   •  Multiple " Vitamins-Minerals (CENTRUM SILVER 50+WOMEN PO), Take by mouth daily with breakfast, Disp: , Rfl:   •  Multiple Vitamins-Minerals (PRESERVISION AREDS 2 PO), , Disp: , Rfl:   •  Cholecalciferol (Vitamin D3) 50 MCG (2000 UT) capsule, Take 2,000 Units by mouth in the morning, Disp: , Rfl:   •  Fluocinolone Acetonide Scalp 0.01 % OIL, Apply topically 2 (two) times a week Apply to scalp, leave over night, Disp: 118 mL, Rfl: 2  •  ketoconazole (NIZORAL) 2 % shampoo, Apply 1 application topically daily for 30 doses, Disp: 120 mL, Rfl: 3  •  L-Lysine 500 MG TABS, Take 1 tablet every day by oral route., Disp: , Rfl:   •  metroNIDAZOLE (METROCREAM) 0.75 % cream, Apply topically 2 (two) times a day, Disp: 45 g, Rfl: 1  •  VITAMIN E PO, Take 400 Units by mouth in the morning, Disp: , Rfl:           Whom besides the patient is providing clinical information about today's encounter?   NO ADDITIONAL HISTORIAN (patient alone provided history)    Physical Exam and Assessment/Plan by Diagnosis:    HISTORY OF BASAL CELL CARCINOMA    Physical Exam:  Anatomic Location Affected:  right anterior thigh, right hip  Morphological Description of scar:  well healed  Suspected Recurrence: No  Pertinent Positives:  Pertinent Negatives:      Additional History of Present Condition:  History of basal cell carcinoma with no sign of recurrence    Assessment and Plan:  Based on a thorough discussion of this condition and the management approach to it (including a comprehensive discussion of the known risks, side effects and potential benefits of treatment), the patient (family) agrees to implement the following specific plan:  Will continue to monitor    How can basal cell carcinoma be prevented?  The most important way to prevent BCC is to avoid sunburn. This is especially important in childhood and early life. Fair skinned individuals and those with a personal or family history of BCC should protect their skin from sun exposure daily, year-round and  lifelong.  Stay indoors or under the shade in the middle of the day   Wear covering clothing   Apply high protection factor SPF50+ broad-spectrum sunscreens generously to exposed skin if outdoors   Avoid indoor tanning (sun beds, solaria)  Oral nicotinamide (vitamin B3) in a dose of 500 mg twice daily may reduce the number and severity of BCCs.    What is the outlook for basal cell carcinoma?  Most BCCs are cured by treatment. Cure is most likely if treatment is undertaken when the lesion is small.  About 50% of people with BCC develop a second one within 3 years of the first. They are also at increased risk of other skin cancers, especially melanoma. Regular self-skin examinations and long-term annual skin checks by an experienced health professional are recommended.     HISTORY OF SQUAMOUS CELL CARCINOMA      Physical Exam:  Anatomic Location Affected:  Right upper arm  Morphological Description of Scar:  Well healed  Suspected Recurrence: no  Regional adenopathy: no     Additional History of Present Condition:  History of squamous cell carcinoma     Assessment and Plan:  Based on a thorough discussion of this condition and the management approach to it (including a comprehensive discussion of the known risks, side effects and potential benefits of treatment), the patient (family) agrees to implement the following specific plan:  When outside we recommend using a wide brim hat, sunglasses, long sleeve and pants, sunscreen with SPF 30+ with reapplication every 2 hours, or SPF specific clothing     How can SCC be prevented?  The most important way to prevent SCC is to avoid sunburn. This is especially important in childhood and early life. Fair skinned individuals and those with a personal or family history of BCC should protect their skin from sun exposure daily, year-round and lifelong.  Stay indoors or under the shade in the middle of the day   Wear covering clothing   Apply high protection factor SPF50+  broad-spectrum sunscreens generously to exposed skin if outdoors   Avoid indoor tanning (sun beds, solaria)      What is the outlook for SCC?  Most SCC are cured by treatment. Cure is most likely if treatment is undertaken when the lesion is small. A small percent of SCC's can spread to lymph  nodes and long term monitoring is indicated.   They are also at increased risk of other skin cancers, especially melanoma. Regular self-skin examinations and long-term annual skin checks by an experienced health professional are recommended.     ACTINIC KERATOSIS    Physical Exam:  Anatomic Location Affected:  scalp  Morphological Description:  Scaly pink papules  Pertinent Positives:  Pertinent Negatives:      Assessment and Plan:  Based on a thorough discussion of this condition and the management approach to it (including a comprehensive discussion of the known risks, side effects and potential benefits of treatment), the patient (family) agrees to implement the following specific plan:  When outside we recommend using a wide brim hat, sunglasses, long sleeve and pants, sunscreen with SPF 30+ with reapplication every 2 hours, or SPF specific clothing   liquid nitrogen to treat areas. Consent obtained. Expect area to blister, crust, and then fall off within 2 weeks. Please use vaseline.                                         PROCEDURE:  DESTRUCTION OF PRE-MALIGNANT LESIONS  After a thorough discussion of treatment options and risk/benefits/alternatives (including but not limited to local pain, scarring, dyspigmentation, blistering, and possible superinfection), verbal and written consent were obtained and the aforementioned lesions were treated on with cryotherapy using liquid nitrogen x 1 cycle for 5-10 seconds.    TOTAL NUMBER of 4 pre-malignant lesions were treated today on the ANATOMIC LOCATION: scalp.     The patient tolerated the procedure well, and after-care instructions were provided.        ROSACEA    Physical  "Exam:  Anatomic Location Affected chin, nose  Morphological Description:  papules and milia   Pertinent Positives:  Pertinent Negatives:    Additional History of Present Condition:  failed Metronidazole cream   Duration: n/a  Symptoms: redness  Triggers: n/a  Treatments attempted: Metronidazole, low dose doxycycline      Assessment and Plan:  History and physical most consistent with rosacea  Educated that rosacea is a chronic condition affecting the mid-face (nose, cheeks, chin, forehead, eyelids)  Educated that rosacea often presents as redness, telangiectasias, papules and pustules  Discussed that the underlying cause of rosacea is complex, including genetic, environmental, vascular and inflammatory factors  Discussed that there are various triggers to rosacea and that keeping a symptom journal can be helpful in identifying those triggers that make rosacea worse as some are patient-specific  Educated that UV exposure is a universal trigger and recommended use of daily SPF. Specifically recommended a physical blocker SPF to reduce the risk of flaring secondary to irritation from chemical blocker.  Educated on the use of green tinted makeup to mask red coloration  Discussed treatment ladder including trigger avoidance, SPF use, topical treatment (metronidazole, ivermectin, azelaic acid, brimonidine gel, pimecrolimus), oral treatment options (short courses of oral antibiotics with sub-microbial dosing possible), laser treatment  Based on a thorough discussion of this condition and the management approach to it (including a comprehensive discussion of the known risks, side effects and potential benefits of treatment), the patient (family) agrees to implement the following specific plan:  Start use of SPF daily (look for sunscreens that list ONLY zinc oxide and titanium dioxide under \"active ingredients\")  Transition to gentle skin care products (ideally fragrance free). Cerave, cetaphil, vanicream are good brands for " "this.  Start journal to track any triggers and attempt to avoid all triggers  Start Azelaic acid 20% cream, apply twice a day.     ANDROGENETIC ALOPECIA    Physical Exam:    Well appearing, in no acute distress. Well nourishes, well developed. Alert and oriented. A focused skin exam was performed including scalp and hair. 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    If patient would like to pursue supplements, but nutrafol or Viviscal were too expensive, recommended either Saw Palmetto and/or pumpkin seed oil extract  - Educated patient that regrowth may take many months.        MULTIPLE MELANOCYTIC NEVI (\"Moles\")    Physical Exam:  Anatomic Location Affected:   Mostly on sun-exposed areas of the trunk and extremities  Morphological Description:  Scattered, 1-4mm round to ovoid, symmetrical-appearing, even bordered, skin colored to dark brown macules/papules, mostly in sun-exposed areas  Pertinent Positives:  Pertinent Negatives:    Additional History of Present Condition:      Assessment and Plan:  Based on a thorough discussion of this condition and the management approach to it (including a comprehensive discussion of the known risks, side effects and potential benefits of treatment), the patient (family) agrees to implement the following specific plan:  When outside we recommend using a wide brim hat, sunglasses, long sleeve and pants, sunscreen with SPF 30+ with reapplication every 2 hours, or SPF specific clothing   Benign, reassured  Annual skin check     Melanocytic Nevi  Melanocytic nevi (\"moles\") are tan or brown, raised or flat areas of the skin which have an increased number of melanocytes. Melanocytes are the cells in our body which make " "pigment and account for skin color.    Some moles are present at birth (I.e., \"congenital nevi\"), while others come up later in life (i.e., \"acquired nevi\").  The sun can stimulate the body to make more moles.  Sunburns are not the only thing that triggers more moles.  Chronic sun exposure can do it too.     Clinically distinguishing a healthy mole from melanoma may be difficult, even for experienced dermatologists. The \"ABCDE's\" of moles have been suggested as a means of helping to alert a person to a suspicious mole and the possible increased risk of melanoma.  The suggestions for raising alert are as follows:    Asymmetry: Healthy moles tend to be symmetric, while melanomas are often asymmetric.  Asymmetry means if you draw a line through the mole, the two halves do not match in color, size, shape, or surface texture. Asymmetry can be a result of rapid enlargement of a mole, the development of a raised area on a previously flat lesion, scaling, ulceration, bleeding or scabbing within the mole.  Any mole that starts to demonstrate \"asymmetry\" should be examined promptly by a board certified dermatologist.     Border: Healthy moles tend to have discrete, even borders.  The border of a melanoma often blends into the normal skin and does not sharply delineate the mole from normal skin.  Any mole that starts to demonstrate \"uneven borders\" should be examined promptly by a board certified dermatologist.     Color: Healthy moles tend to be one color throughout.  Melanomas tend to be made up of different colors ranging from dark black, blue, white, or red.  Any mole that demonstrates a color change should be examined promptly by a board certified dermatologist.     Diameter: Healthy moles tend to be smaller than 0.6 cm in size; an exception are \"congenital nevi\" that can be larger.  Melanomas tend to grow and can often be greater than 0.6 cm (1/4 of an inch, or the size of a pencil eraser). This is only a guideline, and " "many normal moles may be larger than 0.6 cm without being unhealthy.  Any mole that starts to change in size (small to bigger or bigger to smaller) should be examined promptly by a board certified dermatologist.     Evolving: Healthy moles tend to \"stay the same.\"  Melanomas may often show signs of change or evolution such as a change in size, shape, color, or elevation.  Any mole that starts to itch, bleed, crust, burn, hurt, or ulcerate or demonstrate a change or evolution should be examined promptly by a board certified dermatologist.        LENTIGO    Physical Exam:  Anatomic Location Affected:  trunk, arms  Morphological Description:  Light brown macules  Pertinent Positives:  Pertinent Negatives:    Additional History of Present Condition:  present on exam     Assessment and Plan:  Based on a thorough discussion of this condition and the management approach to it (including a comprehensive discussion of the known risks, side effects and potential benefits of treatment), the patient (family) agrees to implement the following specific plan:  When outside we recommend using a wide brim hat, sunglasses, long sleeve and pants, sunscreen with SPF 30+ with reapplication every 2 hours, or SPF specific clothing       What is a lentigo?  A lentigo is a pigmented flat or slightly raised lesion with a clearly defined edge. Unlike an ephelis (freckle), it does not fade in the winter months. There are several kinds of lentigo.  The name lentigo originally referred to its appearance resembling a small lentil. The plural of lentigo is lentigines, although “lentigos” is also in common use.    Who gets lentigines?  Lentigines can affect males and females of all ages and races. Solar lentigines are especially prevalent in fair skinned adults. Lentigines associated with syndromes are present at birth or arise during childhood.    What causes lentigines?  Common forms of lentigo are due to exposure to ultraviolet radiation:  Sun " "damage including sunburn   Indoor tanning   Phototherapy, especially photochemotherapy (PUVA)    Ionizing radiation, eg radiation therapy, can also cause lentigines.  Several familial syndromes associated with widespread lentigines originate from mutations in [place]-MAP kinase, mTOR signaling and PTEN pathways.    What is the treatment for lentigines?  Most lentigines are left alone. Attempts to lighten them may not be successful. The following approaches are used:  SPF 50+ broad-spectrum sunscreen   Hydroquinone bleaching cream   Alpha hydroxy acids   Vitamin C   Retinoids   Azelaic acid   Chemical peels  Individual lesions can be permanently removed using:  Cryotherapy   Intense pulsed light   Pigment lasers    How can lentigines be prevented?  Lentigines associated with exposure ultraviolet radiation can be prevented by very careful sun protection. Clothing is more successful at preventing new lentigines than are sunscreens.    What is the outlook for lentigines?  Lentigines usually persist. They may increase in number with age and sun exposure. Some in sun-protected sites may fade and disappear.    LAI ANGIOMAS    Physical Exam:  Anatomic Location Affected:  trunk  Morphological Description:  Scattered cherry red, 1-4 mm papules.  Pertinent Positives:  Pertinent Negatives:    Additional History of Present Condition:   present on exam       Assessment and Plan:  Based on a thorough discussion of this condition and the management approach to it (including a comprehensive discussion of the known risks, side effects and potential benefits of treatment), the patient (family) agrees to implement the following specific plan:  Monitor for changes  Benign, reassured      Assessment and Plan:    Cherry angioma, also known as Mccoy de Sanjay spots, are benign vascular skin lesions. A \"cherry angioma\" is a firm red, blue or purple papule, 0.1-1 cm in diameter. When thrombosed, they can appear black in colour until " "evaluated with a dermatoscope when the red or purple colour is more easily seen. Cherry angioma may develop on any part of the body but most often appear on the scalp, face, lips and trunk.  An angioma is due to proliferating endothelial cells; these are the cells that line the inside of a blood vessel.    Angiomas can arise in early life or later in life; the most common type of angioma is a cherry angioma.  Cherry angiomas are very common in males and females of any age or race. They are more noticeable in white skin than in skin of colour. They markedly increase in number from about the age of 40. There may be a family history of similar lesions. Eruptive cherry angiomas have been rarely reported to be associated with internal malignancy. The cause of angiomas is unknown. Genetic analysis of cherry angiomas has shown that they frequently carry specific somatic missense mutations in the GNAQ and GNA11 (Q209H) genes, which are involved in other vascular and melanocytic proliferations.      SEBORRHEIC KERATOSIS; NON-INFLAMED    Physical Exam:  Anatomic Location Affected:  trunk  Morphological Description:  Flat and raised, waxy, smooth to warty textured, yellow to brownish-grey to dark brown to blackish, discrete, \"stuck-on\" appearing papules.  Pertinent Positives:  Pertinent Negatives:    Additional History of Present Condition:   present on exam       Assessment and Plan:  Based on a thorough discussion of this condition and the management approach to it (including a comprehensive discussion of the known risks, side effects and potential benefits of treatment), the patient (family) agrees to implement the following specific plan:  Monitor for changes  Benign, reassured      Seborrheic Keratosis  A seborrheic keratosis is a harmless warty spot that appears during adult life as a common sign of skin aging.  Seborrheic keratoses can arise on any area of skin, covered or uncovered, with the usual exception of the " "palms and soles. They do not arise from mucous membranes. Seborrheic keratoses can have highly variable appearance.      Seborrheic keratoses are extremely common. It has been estimated that over 90% of adults over the age of 60 years have one or more of them. They occur in males and females of all races, typically beginning to erupt in the 30s or 40s. They are uncommon under the age of 20 years.  The precise cause of seborrhoeic keratoses is not known.  Seborrhoeic keratoses are considered degenerative in nature. As time goes by, seborrheic keratoses tend to become more numerous. Some people inherit a tendency to develop a very large number of them; some people may have hundreds of them.      There is no easy way to remove multiple lesions on a single occasion.  Unless a specific lesion is \"inflamed\" and is causing pain or stinging/burning or is bleeding, most insurance companies do not authorize treatment.        Scribe Attestation    I,:  Liz Garza MA am acting as a scribe while in the presence of the attending physician.:       I,:  Eva Ritchie MD personally performed the services described in this documentation    as scribed in my presence.:          "

## 2025-02-27 ENCOUNTER — TELEPHONE (OUTPATIENT)
Age: 80
End: 2025-02-27

## 2025-02-27 NOTE — TELEPHONE ENCOUNTER
Pt calling in regards to medication Dr Boone prescribed at her visit on 2/24    Pt stated she was contacted by [facility] and was told the Dr office needs to do something (pt unsure what exactly) with the med request    Tfrd call to Rosaline for further assistance

## 2025-02-27 NOTE — TELEPHONE ENCOUNTER
Spoke with patient she said she received an email from [facility] that the azelaic acid 20 % note to pharmacy: azelaic acid 15%/niacinamide 2% cream needs a prior authorization

## 2025-02-28 NOTE — TELEPHONE ENCOUNTER
Contacted pharmacy due to not having pharmacy insurance on file, they provided the following:  ID- [id number]  RXBIN- [id number]  RXPCN- [id number]  RXGRP- [id number]    PA for Azelex 20% cream SUBMITTED to [employer]     via    [x]CMMailTime-KEY: [id number]  []Surescripts-Case ID #   []Availity-Auth ID # NDC #   []Faxed to plan   []Other website   []Phone call Case ID #     [x]PA sent as URGENT    All office notes, labs and other pertaining documents and studies sent. Clinical questions answered. Awaiting determination from insurance company.     Turnaround time for your insurance to make a decision on your Prior Authorization can take 7-21 business days.

## 2025-02-28 NOTE — TELEPHONE ENCOUNTER
[facility] pharmacy authorization department calling for more information regarding the Azelex 20% cream authorization. Tried and failed mediations and diagnosis code associated with the need for the medication. Information provided successfully.

## 2025-03-03 DIAGNOSIS — L71.9 ROSACEA: Primary | ICD-10-CM

## 2025-03-03 RX ORDER — AZELAIC ACID 0.15 G/G
1 GEL TOPICAL 2 TIMES DAILY
Qty: 30 G | Refills: 2 | Status: SHIPPED | OUTPATIENT
Start: 2025-03-03

## 2025-03-03 NOTE — TELEPHONE ENCOUNTER
PA for Azelex 20% cream  DENIED    Reason:(Screenshot if applicable)        Message sent to office clinical pool Yes    Denial letter scanned into Media Yes    Appeal started No (Provider will need to decide if appeal is warranted and send clinical documentation to Prior Authorization Team for initiation.)    **Please follow up with your patient regarding denial and next steps**

## 2025-07-01 ENCOUNTER — TELEPHONE (OUTPATIENT)
Age: 80
End: 2025-07-01

## 2025-07-01 NOTE — TELEPHONE ENCOUNTER
Rec'd call from patient stating that she had an appointment card with one appt and her calendar and MyChart had a different one.    Patient was just looking for clarification.    Explained practice reschedule.    Patient accepted rescheduled appointment with Dr. Boone on 8/19/2025 @ 10:40 am.    Patient aware of office location.  Patient aware to arrive 15 minutes prior.  Confirmed Medicare/[facility] health coverage.

## 2025-08-19 ENCOUNTER — TELEPHONE (OUTPATIENT)
Age: 80
End: 2025-08-19

## 2025-08-19 ENCOUNTER — OFFICE VISIT (OUTPATIENT)
Age: 80
End: 2025-08-19

## 2025-08-19 VITALS — BODY MASS INDEX: 23 KG/M2 | HEIGHT: 62 IN | WEIGHT: 125 LBS

## 2025-08-19 DIAGNOSIS — D48.9 NEOPLASM OF UNCERTAIN BEHAVIOR: Primary | ICD-10-CM

## 2025-08-19 DIAGNOSIS — L72.0 MILIUM: ICD-10-CM

## 2025-08-19 DIAGNOSIS — D22.9 MULTIPLE MELANOCYTIC NEVI: ICD-10-CM

## 2025-08-19 DIAGNOSIS — L57.0 KERATOSIS, ACTINIC: ICD-10-CM

## 2025-08-19 DIAGNOSIS — Z85.828 HISTORY OF BASAL CELL CARCINOMA: ICD-10-CM

## 2025-08-19 DIAGNOSIS — L85.3 XEROSIS CUTIS: ICD-10-CM

## 2025-08-19 DIAGNOSIS — Z85.89 HISTORY OF SQUAMOUS CELL CARCINOMA: ICD-10-CM

## 2025-08-19 DIAGNOSIS — D69.2 SOLAR PURPURA (HCC): ICD-10-CM

## 2025-08-19 DIAGNOSIS — L82.1 SEBORRHEIC KERATOSIS: ICD-10-CM

## (undated) DEVICE — 45° KELMAN®, 0.9 MM TURBOSONICS® MINI-FLARED ABS® TIP: Brand: ALCON, KELMAN, TURBOSONICS, MINI-FLARED ABS

## (undated) DEVICE — GLOVE SRG BIOGEL 7.5

## (undated) DEVICE — 0.9MM MICROSMOOTH NULTRA INFUSION SLEEVE KIT: Brand: INFINIT, MICROSMOOTH, ALCON

## (undated) DEVICE — AIR INJECT CANNULA 27GA: Brand: OPHTHALMIC CANNULA

## (undated) DEVICE — THE MONARCH® "D" CARTRIDGE IS A SINGLE-USE POLYPROPYLENE CARTRIDGE FOR POSTERIOR CHAMBER IOL DELIVERY: Brand: MONARCH® III

## (undated) DEVICE — B-H IRRIGATING CAN 19GA FLAT ANGLED 8MM: Brand: OPHTHALMIC CANNULA

## (undated) DEVICE — CLEARCUT® SLIT KNIFE INTREPID MICRO-COAXIAL SYSTEM 2.4 SB: Brand: CLEARCUT®; INTREPID

## (undated) DEVICE — BASIC ULTRASOUND: Brand: ALCON, INFINITI

## (undated) DEVICE — EYE PACK CUSTOM -FINNEGAN